# Patient Record
Sex: FEMALE
[De-identification: names, ages, dates, MRNs, and addresses within clinical notes are randomized per-mention and may not be internally consistent; named-entity substitution may affect disease eponyms.]

---

## 2017-07-15 ENCOUNTER — HOSPITAL ENCOUNTER (INPATIENT)
Dept: HOSPITAL 5 - ED | Age: 42
LOS: 1 days | Discharge: HOME | DRG: 315 | End: 2017-07-16
Attending: INTERNAL MEDICINE | Admitting: INTERNAL MEDICINE
Payer: MEDICARE

## 2017-07-15 DIAGNOSIS — Z98.51: ICD-10-CM

## 2017-07-15 DIAGNOSIS — N18.9: ICD-10-CM

## 2017-07-15 DIAGNOSIS — Z83.3: ICD-10-CM

## 2017-07-15 DIAGNOSIS — E78.5: ICD-10-CM

## 2017-07-15 DIAGNOSIS — E03.9: ICD-10-CM

## 2017-07-15 DIAGNOSIS — Z88.2: ICD-10-CM

## 2017-07-15 DIAGNOSIS — E66.9: ICD-10-CM

## 2017-07-15 DIAGNOSIS — I25.2: ICD-10-CM

## 2017-07-15 DIAGNOSIS — S93.402A: ICD-10-CM

## 2017-07-15 DIAGNOSIS — E11.22: ICD-10-CM

## 2017-07-15 DIAGNOSIS — I95.9: Primary | ICD-10-CM

## 2017-07-15 DIAGNOSIS — Z90.79: ICD-10-CM

## 2017-07-15 DIAGNOSIS — Z95.5: ICD-10-CM

## 2017-07-15 DIAGNOSIS — E87.6: ICD-10-CM

## 2017-07-15 DIAGNOSIS — I50.22: ICD-10-CM

## 2017-07-15 DIAGNOSIS — I25.5: ICD-10-CM

## 2017-07-15 DIAGNOSIS — I25.110: ICD-10-CM

## 2017-07-15 DIAGNOSIS — Z95.810: ICD-10-CM

## 2017-07-15 DIAGNOSIS — E11.40: ICD-10-CM

## 2017-07-15 DIAGNOSIS — Z82.49: ICD-10-CM

## 2017-07-15 DIAGNOSIS — K21.9: ICD-10-CM

## 2017-07-15 DIAGNOSIS — I13.0: ICD-10-CM

## 2017-07-15 PROCEDURE — 93010 ELECTROCARDIOGRAM REPORT: CPT

## 2017-07-15 PROCEDURE — 84484 ASSAY OF TROPONIN QUANT: CPT

## 2017-07-15 PROCEDURE — 80048 BASIC METABOLIC PNL TOTAL CA: CPT

## 2017-07-15 PROCEDURE — 93005 ELECTROCARDIOGRAM TRACING: CPT

## 2017-07-15 PROCEDURE — 36415 COLL VENOUS BLD VENIPUNCTURE: CPT

## 2017-07-15 PROCEDURE — 96374 THER/PROPH/DIAG INJ IV PUSH: CPT

## 2017-07-15 PROCEDURE — 82962 GLUCOSE BLOOD TEST: CPT

## 2017-07-15 PROCEDURE — 82550 ASSAY OF CK (CPK): CPT

## 2017-07-15 PROCEDURE — 85025 COMPLETE CBC W/AUTO DIFF WBC: CPT

## 2017-07-15 PROCEDURE — 82553 CREATINE MB FRACTION: CPT

## 2017-07-15 PROCEDURE — 96375 TX/PRO/DX INJ NEW DRUG ADDON: CPT

## 2017-07-15 PROCEDURE — 96372 THER/PROPH/DIAG INJ SC/IM: CPT

## 2017-07-16 VITALS — SYSTOLIC BLOOD PRESSURE: 93 MMHG | DIASTOLIC BLOOD PRESSURE: 54 MMHG

## 2017-07-16 LAB
ANION GAP SERPL CALC-SCNC: 20 MMOL/L
BASOPHILS NFR BLD AUTO: 0.6 % (ref 0–1.8)
BUN SERPL-MCNC: 34 MG/DL (ref 7–17)
BUN/CREAT SERPL: 22.66 %
CALCIUM SERPL-MCNC: 9.1 MG/DL (ref 8.4–10.2)
CHLORIDE SERPL-SCNC: 93.6 MMOL/L (ref 98–107)
CK SERPL-CCNC: 33 UNITS/L (ref 30–135)
CK SERPL-CCNC: 46 UNITS/L (ref 30–135)
CK SERPL-CCNC: 48 UNITS/L (ref 30–135)
CO2 SERPL-SCNC: 26 MMOL/L (ref 22–30)
EOSINOPHIL NFR BLD AUTO: 2.2 % (ref 0–4.3)
GLUCOSE SERPL-MCNC: 122 MG/DL (ref 65–100)
HCT VFR BLD CALC: 37.4 % (ref 30.3–42.9)
HGB BLD-MCNC: 12.8 GM/DL (ref 10.1–14.3)
MCH RBC QN AUTO: 32 PG (ref 28–32)
MCHC RBC AUTO-ENTMCNC: 34 % (ref 30–34)
MCV RBC AUTO: 94 FL (ref 79–97)
PLATELET # BLD: 279 K/MM3 (ref 140–440)
POTASSIUM SERPL-SCNC: 3.2 MMOL/L (ref 3.6–5)
RBC # BLD AUTO: 3.99 M/MM3 (ref 3.65–5.03)
SODIUM SERPL-SCNC: 136 MMOL/L (ref 137–145)
WBC # BLD AUTO: 9.6 K/MM3 (ref 4.5–11)

## 2017-07-16 RX ADMIN — MORPHINE SULFATE PRN MG: 2 INJECTION, SOLUTION INTRAMUSCULAR; INTRAVENOUS at 14:01

## 2017-07-16 RX ADMIN — GABAPENTIN SCH MG: 300 CAPSULE ORAL at 12:23

## 2017-07-16 RX ADMIN — ENOXAPARIN SODIUM SCH: 100 INJECTION SUBCUTANEOUS at 10:11

## 2017-07-16 RX ADMIN — MORPHINE SULFATE PRN MG: 2 INJECTION, SOLUTION INTRAMUSCULAR; INTRAVENOUS at 07:35

## 2017-07-16 RX ADMIN — GABAPENTIN SCH: 300 CAPSULE ORAL at 15:00

## 2017-07-16 RX ADMIN — ENOXAPARIN SODIUM SCH MG: 100 INJECTION SUBCUTANEOUS at 08:52

## 2017-07-16 NOTE — DISCHARGE SUMMARY
Providers





- Providers


Date of Admission: 


07/16/17 05:33





Date of discharge: 07/16/17


Attending physician: 


BERT VALLEJO MD





 





07/16/17


Consult to Cardiac Rehabilitation [CONS] Routine 


   Reason For Exam: Phase 1





07/16/17 05:35


Consult to Physician [CONS] Routine 


   Consulting Provider: ANJEL WATSON


   Reason For Exam: ua


   Place consult to:: CARDIOLOGY


   Notified:: Y


   Was contact made?: Yes


   If yes, spoke with:: RENA/PER CAZARES


   Time called:: 08:20











Primary care physician: 


PRIMARY CARE MD








Hospitalization


Condition: Stable


Disposition: DC-01 TO HOME OR SELFCARE


Time spent for discharge: 31 mins





Exam





- Constitutional


Vitals: 


 











Temp Pulse Resp BP Pulse Ox


 


 99.1 F   69   13   93/54   98 


 


 07/15/17 22:53  07/16/17 10:30  07/16/17 10:30  07/16/17 10:30  07/16/17 10:30














Plan


Activity: advance as tolerated, fall precautions


Diet: low fat, low cholesterol, low salt, diabetic


Special Instructions: record daily weights, record daily BP diary, record blood 

sugar diary


Additional Instructions: Follow with primary cardiologist in 1 week


Follow up with: 


PRIMARY CARE,MD [Primary Care Provider] - 7 Days

## 2017-07-16 NOTE — CONSULTATION
History of Present Illness


Consult date: 07/16/17


Requesting physician: BERT VALLEJO


Consult reason: chest pain, hypotension, known to you


History of present illness: 





41-year-old female with a past medical history history of coronary artery 

disease status post myocardial infarction and multiple stents her most recent 

to the right coronary artery in March 2017, ischemic cardiomyopathy status post 

cardiac defibrillator, chronic systolic heart failure, hypertension, 

hypothyroidism, and diabetes presents to [facility] emergency department after the patient was being evaluated in 

the urgent care for an ankle sprain.  While in the urgent care the patient 

developed hypotension with some mild lightheadedness and dizziness.  Since 

being here at the hospital the patient's blood pressure has normalized.  And 

she is completely asymptomatic.





Past History


Past Medical History: arrhythmia, CAD, diabetes, heart failure, hypertension, 

hyperlipidemia, hypothyroidism


Past Surgical History: PTCA


Social history: denies: smoking, alcohol abuse, IV drug use


Family history: CAD, diabetes





Medications and Allergies


 Allergies











Allergy/AdvReac Type Severity Reaction Status Date / Time


 


adhesive tape Allergy  Rash Verified 04/28/16 17:40


 


bupivacaine Allergy  Angioedema Verified 04/28/16 17:40


 


Sulfa (Sulfonamide Allergy  Nausea Verified 04/28/16 17:40





Antibiotics)     


 


famotidine [From Pepcid] AdvReac  Vomiting Verified 04/28/16 17:40











 Home Medications











 Medication  Instructions  Recorded  Confirmed  Last Taken  Type


 


Isosorbide Mononitrate [Isosorbide 120 mg PO DAILY 02/05/15 07/15/17 04/25/17 

History





Mononitrate ER (IMDUR)]     


 


traZODone [Desyrel] 100 mg PO QHS  tablet 02/07/16 07/15/17 04/25/17 Rx


 


Potassium Chloride [K-Dur] 20 meq PO BID #60 tab 03/20/16 07/15/17 04/25/17 Rx


 


amLODIPine [Norvasc] 5 mg PO QAM 04/26/17 07/15/17 04/25/17 History


 


ALPRAZolam [Xanax TAB] 0.5 mg PO Q8H PRN #15 tablet 04/27/17 07/15/17 Unknown Rx


 


Aspirin EC [Aspirin Enteric Coated 325 mg PO QDAY #30 tablet 04/27/17 07/15/17 

Unknown Rx





TAB]     


 


AtorvaSTATin [Lipitor] 80 mg PO QHS #30 tablet 04/27/17 07/15/17 Unknown Rx


 


Clopidogrel [Plavix] 75 mg PO DAILY #30 tablet 04/27/17 07/15/17 Unknown Rx


 


Gabapentin [Neurontin] 600 mg PO TID #90 capsule 04/27/17 07/15/17 Unknown Rx


 


Insulin NPH, Human [NovoLIN N] 35 unit SUB-Q QAM #30 units 04/27/17 07/15/17 

Unknown Rx


 


Sertraline [Zoloft] 50 mg PO QDAY #30 tablet 04/27/17 07/15/17 Unknown Rx


 


Torsemide [Demadex] 100 mg PO QDAY #30 tablet 04/27/17 07/15/17 Unknown Rx


 


Insulin Glulisine [Apidra] 0 units SC QDAY 06/22/17 07/15/17 Unknown History


 


Levothyroxine [Synthroid] 100 mcg PO QDAY 06/22/17 07/15/17 Unknown History


 


Metoprolol Xl [Metoprolol 200 mg PO QDAY 06/22/17 07/15/17 Unknown History





SUCCINATE ER TAB]     


 


traMADol [Ultram 50 MG tab] 50 mg PO Q6HR PRN #20 tablet 06/24/17 07/15/17 

Unknown Rx











Active Meds: 


Active Medications





Acetaminophen (Tylenol)  650 mg PO Q4H PRN


   PRN Reason: Pain MILD(1-3)/Fever >100.5/HA


Alprazolam (Xanax)  0.5 mg PO Q8H PRN


   PRN Reason: Anxiety


Amlodipine Besylate (Norvasc)  5 mg PO QAM [facility]


   Last Admin: 07/16/17 12:02 Dose:  Not Given


Aspirin (Ecotrin)  325 mg PO QDAY [facility]


Atorvastatin Calcium (Lipitor)  80 mg PO QHS [facility]


Bisacodyl (Dulcolax)  10 mg SC QDAY PRN


   PRN Reason: Constipation unrelieved by MOM


Clopidogrel Bisulfate (Plavix)  75 mg PO DAILY [facility]


Dextrose (D50w (25gm))  50 ml IV PRN PRN


   PRN Reason: Hypoglycemia


Diphenhydramine HCl (Benadryl)  50 mg IV Q8H PRN


   PRN Reason: Itching


Enoxaparin Sodium (Lovenox)  30 mg SUB-Q QDAY [facility]


   Last Admin: 07/16/17 10:11 Dose:  Not Given


Gabapentin (Neurontin)  600 mg PO TID [facility]


Insulin Aspart (Novolog)  7 units SUB-Q ACHS [facility]


   Last Admin: 07/16/17 11:57 Dose:  7 units


Insulin Aspart (Novolog)  0 units SUB-Q ACHS [facility]


   PRN Reason: Protocol


   Last Admin: 07/16/17 11:57 Dose:  3 units


Insulin Detemir (Levemir)  25 units SUB-Q QHS [facility]


Isosorbide Mononitrate (Imdur)  120 mg PO DAILY [facility]


   Last Admin: 07/16/17 12:10 Dose:  Not Given


Levothyroxine Sodium (Synthroid)  100 mcg PO QDAY@0600 [facility]


Magnesium Hydroxide (Milk Of Magnesia)  30 ml PO Q4H PRN


   PRN Reason: Constipation


Metoprolol Succinate (Toprol Xl)  200 mg PO QDAY [facility]


   Last Admin: 07/16/17 12:10 Dose:  Not Given


Morphine Sulfate (Morphine)  2 mg IV Q4H PRN


   PRN Reason: Pain, Moderate (4-6)


   Last Admin: 07/16/17 07:35 Dose:  2 mg


Ondansetron HCl (Zofran)  4 mg IV Q8H PRN


   PRN Reason: N/V unrelieved by Reglan


Potassium Chloride (K-Dur)  20 meq PO BID [facility]


Sertraline HCl (Zoloft)  50 mg PO QDAY [facility]


Torsemide (Demadex)  100 mg PO QDAY [facility]


Tramadol HCl (Ultram)  50 mg PO Q6H PRN


   PRN Reason: Pain











Review of Systems


Constitutional: no weight loss, no weight gain, no fever


Breasts: deferred


Cardiovascular: no chest pain, no orthopnea, no palpitations, no shortness of 

breath


Respiratory: no cough, no cough with sputum


Gastrointestinal: no abdominal pain, no nausea, no vomiting


Genitourinary Female: no dysuria, no urinary frequency


Rectal: no pain, no incontinence


Musculoskeletal: no neck stiffness, no neck pain


Integumentary: no rash, no pruritis


Neurological: no paralysis, no weakness


Psychiatric: no anxiety, no memory loss


Endocrine: no heat intolerance, no polyphagia





Physical Examination


 Vital Signs











Temp Pulse Resp BP Pulse Ox


 


 99.1 F   62   18   107/63   100 


 


 07/15/17 22:53  07/15/17 22:53  07/15/17 22:53  07/15/17 22:53  07/15/17 22:53











General appearance: no acute distress, mild distress


HEENT: Positive: PERRL


Neck: Positive: neck supple, trachea midline


Cardiac: Positive: Reg Rate and Rhythm


Lungs: Positive: Normal Exam, clear to auscultation, Normal Breath Sounds


Neuro: Positive: Grossly Intact


Abdomen: Positive: Unremarkable, Soft, Active Bowel Sounds


Female genitourinary: deferred


Skin: Positive: Clear.  Negative: Rash


Extremities: Present: warm.  Absent: edema





Results





 07/16/17 00:45





 07/16/17 00:45


 Cardiac Enzymes











  07/16/17 Range/Units





  08:24 


 


CK-MB (CK-2)  1.3  (0.0-4.0)  ng/mL














- Imaging and Cardiology


Echo: report reviewed (ECHO 2/17:  EF 20-25%, mild LAE, mild MR)


Cardiac cath: report reviewed (6/17:  RCA:  mid , LM:  patent, LCX:  distal 

30-40%, LAD:  patent mid stent, left system very small for adult)





EKG interpretations





- Telemetry


EKG Rhythm: Sinus Rhythm





Assessment and Plan





Hypotension


   Resolved





Chest pain


   atypical 


   resolved


   Negative cardiac enzymes and no acute EKG changes


   


CAD:  RCAs/p MI/multiple stents/chronic angina


   stable





Chronic systolic heart failure





Ischemic CM


   S/P ICD





Hypertension


Diabetes


Obesity





Stable cardiac status


Continue current medication regimen


Okay to discharge home

## 2017-07-16 NOTE — HISTORY AND PHYSICAL REPORT
History of Present Illness


Date of examination: 07/16/17


History of present illness: 


40-year-old woman with a history of hypertension, diabetes complicated by 

gastroparesis, coronary artery disease, CHF, hypothyroidism comes to the 

emergency room with complaints of chest pain and shortness of breath which 

started yesterday.  Also states she fell down some stairs, went to urgent care 

and her blood pressure was noted to be low, systolic 70s, she was sent to the 

emergency room for further evaluation.  She was diagnosed with a sprain ankle 

at urgent care.  Pain is in the left chest area which she describes a sharp, 

pressure like pain, intermittent in nature's every 4 minutes, no radiation, 

intensity 7/10 .  Pain is worse with activity, better at rest. Admits to nausea

, shortness of breath, diaphoresis. 


Patient denies cough, abdominal pain, hematochezia, dysuria, frequency, focal 

weakness, dysarthria, fever chills, polydipsia polyuria, hot or cold intolerance

, easy bruisability, or rash or bleeding from mucosal membrane, rhinorrhea, 

epistaxis, earache, tinnitus, blurry vision, eye discharge, anxiety, 

depression. Other review of systems negative





PAST SURGICAL HISTORY: tubal ligation, left salpingectomy, right knee surgery, 

right arm surgery, left eye surgery, AICD





SOCIAL HISTORY: Denies alcohol, tobacco, drugs





FAMILY HISTORY: Coronary artery disease











Medications and Allergies


 Allergies











Allergy/AdvReac Type Severity Reaction Status Date / Time


 


adhesive tape Allergy  Rash Verified 04/28/16 17:40


 


bupivacaine Allergy  Angioedema Verified 04/28/16 17:40


 


Sulfa (Sulfonamide Allergy  Nausea Verified 04/28/16 17:40





Antibiotics)     


 


famotidine [From Pepcid] AdvReac  Vomiting Verified 04/28/16 17:40











 Home Medications











 Medication  Instructions  Recorded  Confirmed  Last Taken  Type


 


Isosorbide Mononitrate [Isosorbide 120 mg PO DAILY 02/05/15 07/15/17 04/25/17 

History





Mononitrate ER (IMDUR)]     


 


traZODone [Desyrel] 100 mg PO QHS  tablet 02/07/16 07/15/17 04/25/17 Rx


 


Potassium Chloride [K-Dur] 20 meq PO BID #60 tab 03/20/16 07/15/17 04/25/17 Rx


 


amLODIPine [Norvasc] 5 mg PO QAM 04/26/17 07/15/17 04/25/17 History


 


ALPRAZolam [Xanax TAB] 0.5 mg PO Q8H PRN #15 tablet 04/27/17 07/15/17 Unknown Rx


 


Aspirin EC [Aspirin Enteric Coated 325 mg PO QDAY #30 tablet 04/27/17 07/15/17 

Unknown Rx





TAB]     


 


AtorvaSTATin [Lipitor] 80 mg PO QHS #30 tablet 04/27/17 07/15/17 Unknown Rx


 


Clopidogrel [Plavix] 75 mg PO DAILY #30 tablet 04/27/17 07/15/17 Unknown Rx


 


Gabapentin [Neurontin] 600 mg PO TID #90 capsule 04/27/17 07/15/17 Unknown Rx


 


Insulin NPH, Human [NovoLIN N] 35 unit SUB-Q QAM #30 units 04/27/17 07/15/17 

Unknown Rx


 


Sertraline [Zoloft] 50 mg PO QDAY #30 tablet 04/27/17 07/15/17 Unknown Rx


 


Torsemide [Demadex] 100 mg PO QDAY #30 tablet 04/27/17 07/15/17 Unknown Rx


 


Insulin Glulisine [Apidra] 0 units SC QDAY 06/22/17 07/15/17 Unknown History


 


Levothyroxine [Synthroid] 100 mcg PO QDAY 06/22/17 07/15/17 Unknown History


 


Metoprolol Xl [Metoprolol 200 mg PO QDAY 06/22/17 07/15/17 Unknown History





SUCCINATE ER TAB]     


 


traMADol [Ultram 50 MG tab] 50 mg PO Q6HR PRN #20 tablet 06/24/17 07/15/17 

Unknown Rx














Exam





- Physical Exam


Narrative exam: 


Gen. appearance: Patient lying in bed, no apparent distress


HEENT: Normocephalic, atraumatic, pupils equally round and reactive to light, 

extraocular movement intact, and no sclericterus,. No JVD or thyromegaly or 

nodule,neck supple, no carotid bruit ,mucous membranes moist, no exudate or 

erythema


Heart: S1, S2, regular rate and rhythm


Lungs: Clear to auscultation bilaterally, breathing comfortable


Abdomen: Positive bowel sounds, nontender, nondistended, no organomegaly


Extremity: No edema, cyanosis, clubbing


Skin: No rash, nodules, warm, dry


Neuro: Oriented 3, cranial nerves II-12 intact, speech is fluent, motor and 

sensory intact








- Constitutional


Vitals: 


 











Temp Pulse Resp BP Pulse Ox


 


 99.1 F   68   12   100/61   99 


 


 07/15/17 22:53  07/16/17 03:00  07/16/17 03:00  07/16/17 03:00  07/16/17 03:00














Results





- Labs


CBC & Chem 7: 


 07/16/17 00:45





 07/16/17 00:45


Labs: 


 Abnormal lab results











  07/16/17 07/16/17 Range/Units





  00:45 00:45 


 


Lymph % (Auto)  36.5 H   (13.4-35.0)  %


 


Mono % (Auto)  8.3 H   (0.0-7.3)  %


 


Sodium   136 L  (137-145)  mmol/L


 


Potassium   3.2 L  (3.6-5.0)  mmol/L


 


Chloride   93.6 L  ()  mmol/L


 


BUN   34 H  (7-17)  mg/dL


 


Creatinine   1.5 H  (0.7-1.2)  mg/dL


 


Glucose   122 H  ()  mg/dL














Assessment and Plan


Unstable angina


Coronary artery disease


CHF, stable


spraiin


Diabetes


Hypothyroidism





Admit to medicine


,Check cardiac enzymes, IV morphine, consult cardiology


Continue cardiac medication, check fingersticks initiated insulin sliding scale


Start DVT prophylaxis

## 2017-07-16 NOTE — EMERGENCY DEPARTMENT REPORT
ED Chest Pain HPI





- General


Chief Complaint: Chest Pain


Stated Complaint: DIZZY, CHEST PAIN


Time Seen by Provider: 07/16/17 00:08


Source: EMS


Mode of arrival: Stretcher


Limitations: No Limitations





- History of Present Illness


Initial Comments: 





41-year-old female with a past medical history of CAD with 14-15 stent with 

most recent PCI to RCA on 06/13/2070 at [place], ischemic cardiopathy myopathy, 

AICD, heart failure with EF of 20-25%, hypothyroidism, chronic kidney disease, 

hypertension, and diabetes presents to the hospital complains of left ankle pain

, chest pain, and hypotension.  Pt  injured her left ankle and went to an 

urgent care for evaluation.  Patient states she had a x-ray that was negative 

for fracture and a Ace wrap was placed.  While at the urgent care center 

patient became hypertensive blood pressure 70/50.  Patient reports that her 

blood pressure medication or her systolic pressure was 100.  She states she 

typically runs 110/68.  After EMS arrival patient began to complain a left 

lateral sharp strong chest pain.  She denies shortness of breath or 

diaphoresis.  Mild nausea reported without vomiting.  Patient is followed by 

the [facility] but also has recent and frequent 

hospitalizations and visits here.


Severity scale (0 -10): 7





- Related Data


 Home Medications











 Medication  Instructions  Recorded  Confirmed  Last Taken


 


Isosorbide Mononitrate [Isosorbide 120 mg PO DAILY 02/05/15 07/15/17 04/25/17





Mononitrate ER (IMDUR)]    


 


amLODIPine [Norvasc] 5 mg PO QAM 04/26/17 07/15/17 04/25/17


 


Insulin Glulisine [Apidra] 0 units SC QDAY 06/22/17 07/15/17 Unknown


 


Levothyroxine [Synthroid] 100 mcg PO QDAY 06/22/17 07/15/17 Unknown


 


Metoprolol Xl [Metoprolol 200 mg PO QDAY 06/22/17 07/15/17 Unknown





SUCCINATE ER TAB]    








 Previous Rx's











 Medication  Instructions  Recorded  Last Taken  Type


 


traZODone [Desyrel] 100 mg PO QHS  tablet 02/07/16 04/25/17 Rx


 


Potassium Chloride [K-Dur] 20 meq PO BID #60 tab 03/20/16 04/25/17 Rx


 


ALPRAZolam [Xanax TAB] 0.5 mg PO Q8H PRN #15 tablet 04/27/17 Unknown Rx


 


Aspirin EC [Aspirin Enteric Coated 325 mg PO QDAY #30 tablet 04/27/17 Unknown Rx





TAB]    


 


AtorvaSTATin [Lipitor] 80 mg PO QHS #30 tablet 04/27/17 Unknown Rx


 


Clopidogrel [Plavix] 75 mg PO DAILY #30 tablet 04/27/17 Unknown Rx


 


Gabapentin [Neurontin] 600 mg PO TID #90 capsule 04/27/17 Unknown Rx


 


Insulin NPH, Human [NovoLIN N] 35 unit SUB-Q QAM #30 units 04/27/17 Unknown Rx


 


Sertraline [Zoloft] 50 mg PO QDAY #30 tablet 04/27/17 Unknown Rx


 


Torsemide [Demadex] 100 mg PO QDAY #30 tablet 04/27/17 Unknown Rx


 


traMADol [Ultram 50 MG tab] 50 mg PO Q6HR PRN #20 tablet 06/24/17 Unknown Rx











 Allergies











Allergy/AdvReac Type Severity Reaction Status Date / Time


 


adhesive tape Allergy  Rash Verified 04/28/16 17:40


 


bupivacaine Allergy  Angioedema Verified 04/28/16 17:40


 


Sulfa (Sulfonamide Allergy  Nausea Verified 04/28/16 17:40





Antibiotics)     


 


famotidine [From Pepcid] AdvReac  Vomiting Verified 04/28/16 17:40














Heart Score





- HEART Score


History: Slightly suspicious


EKG: Non-specific


Age: < 45


Risk factors: > 3 risk factors or hx of atherosclerotic disease


Troponin: < normal limit


HEART Score: 3





ED Review of Systems


ROS: 


Stated complaint: DIZZY, CHEST PAIN


Other details as noted in HPI





Comment: All other systems reviewed and negative


Other: 





Constitutional: No fevers chills or weight loss


Eyes: No eye pain visual changes or discharge


ENT: No ear pain or throat pain


Neck: Denies pain


Respiratory: Denies cough wheezing 


Cardiovascular: Denies  palpitations, syncope


GI: Denies abdominal pain


: Denies dysuria


Musculoskeletal: as per hpi 


Skin: Denies rash, lesions, erythema


Neurologic: Denies headache, numbness, weakness


Psychiatric: Denies suicidal ideation, hallucinations








ED Past Medical Hx





- Past Medical History


Previous Medical History?: Yes


Hx Hypertension: Yes


Hx Heart Attack/AMI: Yes


Hx Congestive Heart Failure: Yes


Hx Diabetes: Yes


Hx GERD: Yes


Hx Liver Disease: Yes


Hx Renal Disease: Yes


Hx Asthma: No


Hx COPD: No


Hx Tuberculosis: No


Hx HIV: No


Additional medical history: CAD, gatroparesis, hypOthyroidism, retinopathy, 

neuropathy.  15 stents





- Surgical History


Past Surgical History?: Yes


Hx Coronary Stent: Yes (15)


Hx Pacemaker: Yes


Hx Internal Defibrillator: Yes


Additional Surgical History: tubal ligation, left fallopian tube removed, right 

knee surgery, right arm surgery, left eye surgeryPOWER PORT,Pacemaker  

DEFIBRILATOR Right ring finger surgery





- Social History


Smoking Status: Never Smoker





- Medications


Home Medications: 


 Home Medications











 Medication  Instructions  Recorded  Confirmed  Last Taken  Type


 


Isosorbide Mononitrate [Isosorbide 120 mg PO DAILY 02/05/15 07/15/17 04/25/17 

History





Mononitrate ER (IMDUR)]     


 


traZODone [Desyrel] 100 mg PO QHS  tablet 02/07/16 07/15/17 04/25/17 Rx


 


Potassium Chloride [K-Dur] 20 meq PO BID #60 tab 03/20/16 07/15/17 04/25/17 Rx


 


amLODIPine [Norvasc] 5 mg PO QAM 04/26/17 07/15/17 04/25/17 History


 


ALPRAZolam [Xanax TAB] 0.5 mg PO Q8H PRN #15 tablet 04/27/17 07/15/17 Unknown Rx


 


Aspirin EC [Aspirin Enteric Coated 325 mg PO QDAY #30 tablet 04/27/17 07/15/17 

Unknown Rx





TAB]     


 


AtorvaSTATin [Lipitor] 80 mg PO QHS #30 tablet 04/27/17 07/15/17 Unknown Rx


 


Clopidogrel [Plavix] 75 mg PO DAILY #30 tablet 04/27/17 07/15/17 Unknown Rx


 


Gabapentin [Neurontin] 600 mg PO TID #90 capsule 04/27/17 07/15/17 Unknown Rx


 


Insulin NPH, Human [NovoLIN N] 35 unit SUB-Q QAM #30 units 04/27/17 07/15/17 

Unknown Rx


 


Sertraline [Zoloft] 50 mg PO QDAY #30 tablet 04/27/17 07/15/17 Unknown Rx


 


Torsemide [Demadex] 100 mg PO QDAY #30 tablet 04/27/17 07/15/17 Unknown Rx


 


Insulin Glulisine [Apidra] 0 units SC QDAY 06/22/17 07/15/17 Unknown History


 


Levothyroxine [Synthroid] 100 mcg PO QDAY 06/22/17 07/15/17 Unknown History


 


Metoprolol Xl [Metoprolol 200 mg PO QDAY 06/22/17 07/15/17 Unknown History





SUCCINATE ER TAB]     


 


traMADol [Ultram 50 MG tab] 50 mg PO Q6HR PRN #20 tablet 06/24/17 07/15/17 

Unknown Rx














ED Physical Exam





- General


Limitations: No Limitations





- Other


Other exam information: 





General: No limitations, patient is alert in no acute distress


Head exam: Atraumatic, normocephalic


Eyes exam: Normal appearance, pupils equal reactive to light, extraocular 

movements intact


ENT: Moist mucous membrane, normal oropharynx


Neck exam: Normal inspection, full range of motion, no meningismus nontender


Respiratory exam: Clear to auscultation bilateral, no wheezes, rales, crackles


Cardiovascular: Normal rate and rhythm, normal heart sounds, chest wall 

nontender


Abdomen: Soft, nondistended, and  nontender, with normal bowel sounds, no 

rebound, or guarding


Extremity: Full range of motion, mild swelling and tenderness to the medial 

left ankle, 2+ DP pulse


Back: Normal Inspection, full range of motion, no tenderness


Neurologic: Alert, oriented x3, cranial nerves intact, no motor or sensory 

deficit


Psychiatric: normal affect, normal mood


Skin: Warm, dry, intact





ED Course


 Vital Signs











  07/15/17 07/15/17 07/15/17





  22:53 23:09 23:10


 


Temperature 99.1 F  


 


Pulse Rate 62 64 65


 


Respiratory 18 14 16





Rate   


 


Blood Pressure   95/48


 


Blood Pressure 107/63  





[Left]   


 


O2 Sat by Pulse 100 99 100





Oximetry   














  07/15/17 07/15/17 07/15/17





  23:21 23:26 23:30


 


Temperature   


 


Pulse Rate 64  65


 


Respiratory 16 17 15





Rate   


 


Blood Pressure 92/54  92/57


 


Blood Pressure   





[Left]   


 


O2 Sat by Pulse 98 99 97





Oximetry   














  07/15/17 07/15/17 07/15/17





  23:41 23:43 23:51


 


Temperature   


 


Pulse Rate 64 64 64


 


Respiratory 14 14 14





Rate   


 


Blood Pressure 92/57 92/57 87/58


 


Blood Pressure   





[Left]   


 


O2 Sat by Pulse 97 97 97





Oximetry   














  07/16/17 07/16/17 07/16/17





  00:00 00:11 00:21


 


Temperature   


 


Pulse Rate 67 66 65


 


Respiratory 14 15 14





Rate   


 


Blood Pressure 98/56 98/56 87/61


 


Blood Pressure   





[Left]   


 


O2 Sat by Pulse 94 98 98





Oximetry   














  07/16/17 07/16/17 07/16/17





  00:30 00:41 00:51


 


Temperature   


 


Pulse Rate   66


 


Respiratory   16





Rate   


 


Blood Pressure 87/57 87/57 98/54


 


Blood Pressure   





[Left]   


 


O2 Sat by Pulse 99 98 98





Oximetry   














  07/16/17 07/16/17 07/16/17





  01:00 01:11 01:21


 


Temperature   


 


Pulse Rate 70 63 63


 


Respiratory 14 12 14





Rate   


 


Blood Pressure 101/60 87/57 107/66


 


Blood Pressure   





[Left]   


 


O2 Sat by Pulse 100 99 99





Oximetry   














  07/16/17 07/16/17 07/16/17





  01:31 01:41 01:51


 


Temperature   


 


Pulse Rate 63 64 66


 


Respiratory 14 14 14





Rate   


 


Blood Pressure 107/66 107/66 104/56


 


Blood Pressure   





[Left]   


 


O2 Sat by Pulse 99 99 98





Oximetry   














  07/16/17 07/16/17 07/16/17





  02:00 02:30 03:00


 


Temperature   


 


Pulse Rate 69 68 68


 


Respiratory 15 13 12





Rate   


 


Blood Pressure 96/60 93/51 100/61


 


Blood Pressure   





[Left]   


 


O2 Sat by Pulse 97 97 99





Oximetry   














- Reevaluation(s)


Reevaluation #1: 





07/16/17 


By mouth potassium provided for hypokalemia








ANURAG score





- Anurag Score


Age > 65: (0) No


Aspirin use within the Past 7 Days: (1) Yes


3 or more CAD Risk Factors: (1) Yes


2 or more Angina events in past 24 hrs: (1) Yes


Known CAD with more than 50% Stenosis: (1) Yes


Elevated Cardiac Markers: (1) Yes


ST Deviation Greater than 0.5mm: (0) No


ANURAG Score: 5





ED Medical Decision Making





- Lab Data


Result diagrams: 


 07/16/17 00:45





 07/16/17 00:45








 Lab Results











  07/16/17 07/16/17 07/16/17 Range/Units





  00:45 00:45 00:45 


 


WBC  9.6    (4.5-11.0)  K/mm3


 


RBC  3.99    (3.65-5.03)  M/mm3


 


Hgb  12.8    (10.1-14.3)  gm/dl


 


Hct  37.4    (30.3-42.9)  %


 


MCV  94    (79-97)  fl


 


MCH  32    (28-32)  pg


 


MCHC  34    (30-34)  %


 


RDW  14.4    (13.2-15.2)  %


 


Plt Count  279    (140-440)  K/mm3


 


Lymph % (Auto)  36.5 H    (13.4-35.0)  %


 


Mono % (Auto)  8.3 H    (0.0-7.3)  %


 


Eos % (Auto)  2.2    (0.0-4.3)  %


 


Baso % (Auto)  0.6    (0.0-1.8)  %


 


Lymph #  3.5    (1.2-5.4)  K/mm3


 


Mono #  0.8    (0.0-0.8)  K/mm3


 


Eos #  0.2    (0.0-0.4)  K/mm3


 


Baso #  0.1    (0.0-0.1)  K/mm3


 


Seg Neutrophils %  52.4    (40.0-70.0)  %


 


Seg Neutrophils #  5.0    (1.8-7.7)  K/mm3


 


Sodium   136 L   (137-145)  mmol/L


 


Potassium   3.2 L   (3.6-5.0)  mmol/L


 


Chloride   93.6 L   ()  mmol/L


 


Carbon Dioxide   26   (22-30)  mmol/L


 


Anion Gap   20   mmol/L


 


BUN   34 H   (7-17)  mg/dL


 


Creatinine   1.5 H   (0.7-1.2)  mg/dL


 


Estimated GFR   38   ml/min


 


BUN/Creatinine Ratio   22.66   %


 


Glucose   122 H   ()  mg/dL


 


Calcium   9.1   (8.4-10.2)  mg/dL


 


Total Creatine Kinase    48  ()  units/L


 


CK-MB (CK-2)    1.4  (0.0-4.0)  ng/mL


 


CK-MB (CK-2) Rel Index    2.9  (0-4)  


 


Troponin T   < 0.010   (0.00-0.029)  ng/mL














- EKG Data


-: EKG Interpreted by Me (nsr 68, pvc's antlat infarct prolonged qt)





- EKG Data


When compared to previous EKG there are: no significant change (compared to 6/23 /17)





- Medical Decision Making





Plan to the patient to hospital.  Patient has significant cardiac history and 

complains of chest pain although it sounds atypical in nature.  Initial cardiac 

enzymes negative.  EKG unchanged.  Patient's blood pressure in the 90s systolic 

the patient relatively stable.





- Differential Diagnosis


mi, atypical chest pain, medication reaction, arrhythmia


Critical Care Time: No


Critical care attestation.: 


If time is entered above; I have spent that time in minutes in the direct care 

of this critically ill patient, excluding procedure time.








ED Disposition


Clinical Impression: 


 AICD (automatic cardioverter/defibrillator) present, Chronic renal 

insufficiency, Acute chest pain, Left ankle sprain, H/O percutaneous 

transluminal coronary angioplasty, Stented coronary artery, Hypokalemia, 

Hypotension





Disposition: DC-09 OP ADMIT IP TO THIS HOSP


Is pt being admited?: Yes


Condition: Stable


Time of Disposition: 02:22 (Dr Stephen/hosp)

## 2017-07-29 ENCOUNTER — HOSPITAL ENCOUNTER (EMERGENCY)
Dept: HOSPITAL 5 - ED | Age: 42
Discharge: LEFT BEFORE DISCHARGE | End: 2017-07-29
Payer: MEDICARE

## 2017-07-29 VITALS — SYSTOLIC BLOOD PRESSURE: 115 MMHG | DIASTOLIC BLOOD PRESSURE: 65 MMHG

## 2017-07-29 DIAGNOSIS — R07.9: ICD-10-CM

## 2017-07-29 DIAGNOSIS — R42: Primary | ICD-10-CM

## 2017-07-29 DIAGNOSIS — Z53.21: ICD-10-CM

## 2017-07-29 LAB
ANION GAP SERPL CALC-SCNC: 21 MMOL/L
BASOPHILS NFR BLD AUTO: 0.7 % (ref 0–1.8)
BUN SERPL-MCNC: 25 MG/DL (ref 7–17)
BUN/CREAT SERPL: 19.23 %
CALCIUM SERPL-MCNC: 9 MG/DL (ref 8.4–10.2)
CHLORIDE SERPL-SCNC: 96.4 MMOL/L (ref 98–107)
CO2 SERPL-SCNC: 25 MMOL/L (ref 22–30)
EOSINOPHIL NFR BLD AUTO: 1.2 % (ref 0–4.3)
GLUCOSE SERPL-MCNC: 407 MG/DL (ref 65–100)
HCT VFR BLD CALC: 40 % (ref 30.3–42.9)
HGB BLD-MCNC: 13.3 GM/DL (ref 10.1–14.3)
MCH RBC QN AUTO: 32 PG (ref 28–32)
MCHC RBC AUTO-ENTMCNC: 33 % (ref 30–34)
MCV RBC AUTO: 95 FL (ref 79–97)
PLATELET # BLD: 219 K/MM3 (ref 140–440)
POTASSIUM SERPL-SCNC: 3.9 MMOL/L (ref 3.6–5)
RBC # BLD AUTO: 4.2 M/MM3 (ref 3.65–5.03)
SODIUM SERPL-SCNC: 138 MMOL/L (ref 137–145)
WBC # BLD AUTO: 7.6 K/MM3 (ref 4.5–11)

## 2017-07-29 PROCEDURE — 36415 COLL VENOUS BLD VENIPUNCTURE: CPT

## 2017-07-29 PROCEDURE — 85025 COMPLETE CBC W/AUTO DIFF WBC: CPT

## 2017-07-29 PROCEDURE — 84484 ASSAY OF TROPONIN QUANT: CPT

## 2017-07-29 PROCEDURE — 80048 BASIC METABOLIC PNL TOTAL CA: CPT

## 2017-08-17 ENCOUNTER — HOSPITAL ENCOUNTER (INPATIENT)
Dept: HOSPITAL 5 - ED | Age: 42
LOS: 2 days | Discharge: HOME | DRG: 291 | End: 2017-08-19
Attending: INTERNAL MEDICINE | Admitting: INTERNAL MEDICINE
Payer: MEDICARE

## 2017-08-17 DIAGNOSIS — N18.9: ICD-10-CM

## 2017-08-17 DIAGNOSIS — E11.319: ICD-10-CM

## 2017-08-17 DIAGNOSIS — K21.9: ICD-10-CM

## 2017-08-17 DIAGNOSIS — I25.5: ICD-10-CM

## 2017-08-17 DIAGNOSIS — Z88.2: ICD-10-CM

## 2017-08-17 DIAGNOSIS — E66.9: ICD-10-CM

## 2017-08-17 DIAGNOSIS — E11.40: ICD-10-CM

## 2017-08-17 DIAGNOSIS — Z95.5: ICD-10-CM

## 2017-08-17 DIAGNOSIS — Z95.810: ICD-10-CM

## 2017-08-17 DIAGNOSIS — E11.22: ICD-10-CM

## 2017-08-17 DIAGNOSIS — Z91.048: ICD-10-CM

## 2017-08-17 DIAGNOSIS — Z82.49: ICD-10-CM

## 2017-08-17 DIAGNOSIS — I42.9: ICD-10-CM

## 2017-08-17 DIAGNOSIS — E03.9: ICD-10-CM

## 2017-08-17 DIAGNOSIS — I50.23: ICD-10-CM

## 2017-08-17 DIAGNOSIS — Z88.8: ICD-10-CM

## 2017-08-17 DIAGNOSIS — I13.0: Primary | ICD-10-CM

## 2017-08-17 DIAGNOSIS — I25.118: ICD-10-CM

## 2017-08-17 DIAGNOSIS — Z98.51: ICD-10-CM

## 2017-08-17 LAB
ANION GAP SERPL CALC-SCNC: 20 MMOL/L
BASOPHILS NFR BLD AUTO: 1.1 % (ref 0–1.8)
BUN SERPL-MCNC: 24 MG/DL (ref 7–17)
BUN/CREAT SERPL: 17.14 %
CALCIUM SERPL-MCNC: 8.7 MG/DL (ref 8.4–10.2)
CHLORIDE SERPL-SCNC: 97.3 MMOL/L (ref 98–107)
CO2 SERPL-SCNC: 27 MMOL/L (ref 22–30)
EOSINOPHIL NFR BLD AUTO: 2.9 % (ref 0–4.3)
GLUCOSE SERPL-MCNC: 206 MG/DL (ref 65–100)
HCT VFR BLD CALC: 39.7 % (ref 30.3–42.9)
HGB BLD-MCNC: 13.2 GM/DL (ref 10.1–14.3)
MCH RBC QN AUTO: 32 PG (ref 28–32)
MCHC RBC AUTO-ENTMCNC: 33 % (ref 30–34)
MCV RBC AUTO: 95 FL (ref 79–97)
PLATELET # BLD: 247 K/MM3 (ref 140–440)
POTASSIUM SERPL-SCNC: 3.8 MMOL/L (ref 3.6–5)
RBC # BLD AUTO: 4.18 M/MM3 (ref 3.65–5.03)
SODIUM SERPL-SCNC: 140 MMOL/L (ref 137–145)
WBC # BLD AUTO: 8.6 K/MM3 (ref 4.5–11)

## 2017-08-17 PROCEDURE — 85025 COMPLETE CBC W/AUTO DIFF WBC: CPT

## 2017-08-17 PROCEDURE — G0008 ADMIN INFLUENZA VIRUS VAC: HCPCS

## 2017-08-17 PROCEDURE — 84703 CHORIONIC GONADOTROPIN ASSAY: CPT

## 2017-08-17 PROCEDURE — 84484 ASSAY OF TROPONIN QUANT: CPT

## 2017-08-17 PROCEDURE — 90471 IMMUNIZATION ADMIN: CPT

## 2017-08-17 PROCEDURE — 80048 BASIC METABOLIC PNL TOTAL CA: CPT

## 2017-08-17 PROCEDURE — 93010 ELECTROCARDIOGRAM REPORT: CPT

## 2017-08-17 PROCEDURE — 82962 GLUCOSE BLOOD TEST: CPT

## 2017-08-17 PROCEDURE — 82550 ASSAY OF CK (CPK): CPT

## 2017-08-17 PROCEDURE — 96375 TX/PRO/DX INJ NEW DRUG ADDON: CPT

## 2017-08-17 PROCEDURE — 83880 ASSAY OF NATRIURETIC PEPTIDE: CPT

## 2017-08-17 PROCEDURE — 36415 COLL VENOUS BLD VENIPUNCTURE: CPT

## 2017-08-17 PROCEDURE — 96374 THER/PROPH/DIAG INJ IV PUSH: CPT

## 2017-08-17 PROCEDURE — 90686 IIV4 VACC NO PRSV 0.5 ML IM: CPT

## 2017-08-17 PROCEDURE — 93005 ELECTROCARDIOGRAM TRACING: CPT

## 2017-08-17 PROCEDURE — 71020: CPT

## 2017-08-17 PROCEDURE — 82553 CREATINE MB FRACTION: CPT

## 2017-08-18 LAB — CK SERPL-CCNC: 56 UNITS/L (ref 30–135)

## 2017-08-18 RX ADMIN — ENOXAPARIN SODIUM SCH MG: 100 INJECTION SUBCUTANEOUS at 10:08

## 2017-08-18 RX ADMIN — FUROSEMIDE SCH MG: 10 INJECTION, SOLUTION INTRAVENOUS at 17:50

## 2017-08-18 RX ADMIN — ASPIRIN SCH MG: 81 TABLET, CHEWABLE ORAL at 10:09

## 2017-08-18 RX ADMIN — FUROSEMIDE SCH MG: 10 INJECTION, SOLUTION INTRAVENOUS at 05:07

## 2017-08-18 RX ADMIN — DIPHENHYDRAMINE HYDROCHLORIDE PRN MG: 50 INJECTION, SOLUTION INTRAMUSCULAR; INTRAVENOUS at 19:43

## 2017-08-18 RX ADMIN — LEVOTHYROXINE SODIUM SCH MCG: 0.1 TABLET ORAL at 05:06

## 2017-08-18 RX ADMIN — MORPHINE SULFATE PRN MG: 4 INJECTION, SOLUTION INTRAMUSCULAR; INTRAVENOUS at 13:13

## 2017-08-18 RX ADMIN — INSULIN ASPART SCH UNITS: 100 INJECTION, SOLUTION INTRAVENOUS; SUBCUTANEOUS at 12:12

## 2017-08-18 RX ADMIN — MORPHINE SULFATE PRN MG: 4 INJECTION, SOLUTION INTRAMUSCULAR; INTRAVENOUS at 07:48

## 2017-08-18 RX ADMIN — SERTRALINE SCH MG: 50 TABLET, FILM COATED ORAL at 10:09

## 2017-08-18 RX ADMIN — LISINOPRIL SCH MG: 5 TABLET ORAL at 10:10

## 2017-08-18 RX ADMIN — GABAPENTIN SCH MG: 300 CAPSULE ORAL at 13:13

## 2017-08-18 RX ADMIN — MORPHINE SULFATE PRN MG: 4 INJECTION, SOLUTION INTRAMUSCULAR; INTRAVENOUS at 19:42

## 2017-08-18 RX ADMIN — CLOPIDOGREL BISULFATE SCH MG: 75 TABLET ORAL at 10:08

## 2017-08-18 RX ADMIN — INSULIN ASPART SCH: 100 INJECTION, SOLUTION INTRAVENOUS; SUBCUTANEOUS at 21:55

## 2017-08-18 RX ADMIN — DIPHENHYDRAMINE HYDROCHLORIDE PRN MG: 50 INJECTION, SOLUTION INTRAMUSCULAR; INTRAVENOUS at 13:13

## 2017-08-18 RX ADMIN — GABAPENTIN SCH MG: 300 CAPSULE ORAL at 19:42

## 2017-08-18 RX ADMIN — DIPHENHYDRAMINE HYDROCHLORIDE PRN MG: 50 INJECTION, SOLUTION INTRAMUSCULAR; INTRAVENOUS at 07:05

## 2017-08-18 RX ADMIN — GABAPENTIN SCH MG: 300 CAPSULE ORAL at 07:47

## 2017-08-18 RX ADMIN — METOPROLOL SUCCINATE SCH MG: 25 TABLET, FILM COATED, EXTENDED RELEASE ORAL at 10:10

## 2017-08-18 RX ADMIN — INSULIN ASPART SCH: 100 INJECTION, SOLUTION INTRAVENOUS; SUBCUTANEOUS at 16:30

## 2017-08-18 NOTE — EVENT NOTE
Date: 08/18/17


Patient admitted this morning form chest tightness and CHF exacerbation. 

Cardiology consulted. Chest pain subsided, SOB is getting better.

## 2017-08-18 NOTE — ADMIT CRITERIA FORM
Admission Criteria Documentation: 





     HEART FAILURE: COMMON COMPLICATIONS 





Clinical Indications for Inpatient Care





                                                                               

        (Ruby/check or initial the applicable condition/criteria):


           


Ongoing inpatient care may be indicated for heart failure with 1 or more of the 

following (1)(2)(3)(4)(5)(6)(7)(8):


[ ]I.    New-onset heart failure


[ ]II.   Acute cardiac ischemia causing or associated with failure


[ ]III.  Ongoing need for care for primary condition requiring frequent therapy 

adjustments because of 


         changes in cardiac function (eg, drug dosage changes for drugs that 

are   renally metabolized)


[X ]IV. Complications of heart failure, including  1 or more of the following:


        [ ]a)   Hemodynamic instability


        [ ]b)   Pericardial effusion


        [ ]c)   Symptomatic pleural effusion(16)           


        [ ]d)   Hypoxemia


        [ ]e)   Tachypnea                                         


        [X ]f)    Dyspnea


        [ ]g)   Syncope


        [ ]h)   Altered mental status


        [ ]i)    Acute renal insufficiency that is severe (reduction of more 

than 50% in estimated glomerular 


                 filtration rate from baseline) or progressive (reduction of 

more than 25% in estimated glomerular 


                 filtration rate from baseline, with creatinine continuing to 

rise)


        [ ]j)    Debilitating anasarca (eg tissue breakdown with infection, 

inability to void due to edema)(E) (17)


        [ ]k)   Clinically significant metabolic abnormalities due to heart 

failure (e.g., new-onset metabolic acidosis)








Extended stay may be needed until ALL of the following are present(1)(3)(18)(41)

(55):


[ ]a)    Hemodynamic stability   


[ ]b)    Stable and effective diuretic regimen established (or patient on 

stable dialysis regimen if in chronic


          renal failure)   


[ ]c)    Volume status acceptable on oral medication   


[ ]d)    Breathing comfortably at rest   


[ ]e)    Saturation of arterial oxygen greater than 90% or at acceptable 

baseline      


[ ]f)     Pulmonary edema absent or improved


[ ]g)    Peripheral or sacral edema absent or improved   


[ ]h)    Renal function stable and manageable at a lower level of care      


[ ]i)     Complications (e.g., pleural effusion) resolved or manageable at a 

lower level of care


[ ]j)     Patient or caregiver has received written discharge instructions or 

educational material addressing activity


          level, diet, discharge medications, follow-up appointment, weight 

monitoring, and what to do if symptoms


          worsen. (56)(57)(58)   











The original [facility] [employer] content created by Jami Brady has been revised.


The portions of the content which have been revised are identified through the 

use of italic text or in bold, and Jami Brady 


has neither reviewed nor approved the modified material.All other unmodified 

content is copyright  [facility]





Please see references footnoted in the original [facility] edition 

2017





Admission Criteria Met: Yes

## 2017-08-18 NOTE — EMERGENCY DEPARTMENT REPORT
ED Shortness of Breath HPI





- General


Chief Complaint: Chest Pain


Stated Complaint: CHEST PAIN/DIZZY


Time Seen by Provider: 08/18/17 01:09


Source: patient, old records reviewed (multiple hospital visits for chest pain 

and shortness of breath)


Mode of arrival: Ambulatory


Limitations: No Limitations





- History of Present Illness


Initial Comments: 





41-year-old female with a past medical history CHF, diabetes, GERD, CAD, 

hypertension, renal sufficiency presents to the hospital complains of fluid 

retention and shortness of breath 2 days.  Patient complaining of worsening 

edema to her legs, abdomen, and face.  Patient states she has a monitor in her 

chest to determine when she is volume overloaded.  She received a call from her 

cardiologist that this was the case.  Patient took an extra dose of her 

furosemide and the next day instead of losing weight she gained 1 pound and 

continued to have swelling.  She was called back and told that her numbers were 

even higher today and therefore patient came to the hospital for evaluation and 

diuresis.  Patient complains of intermittent sharp left-sided chest pain 

without aggravating or alleviating factors.  She is compliant with all her 

medication.  Cardiologist at [facility]





- Related Data


 Home Medications











 Medication  Instructions  Recorded  Confirmed  Last Taken


 


Isosorbide Mononitrate [Isosorbide 120 mg PO DAILY 02/05/15 08/18/17 08/17/17





Mononitrate ER (IMDUR)]    


 


amLODIPine [Norvasc] 5 mg PO QAM 04/26/17 08/18/17 08/17/17


 


Insulin Glulisine [Apidra] 0 units SC QDAY 06/22/17 08/18/17 08/17/17


 


Levothyroxine [Synthroid] 100 mcg PO QDAY 06/22/17 08/18/17 08/17/17


 


Metoprolol Xl [Metoprolol 200 mg PO QDAY 06/22/17 08/18/17 08/17/17





SUCCINATE ER TAB]    








 Previous Rx's











 Medication  Instructions  Recorded  Last Taken  Type


 


traZODone [Desyrel] 100 mg PO QHS  tablet 02/07/16 08/16/17 Rx


 


Potassium Chloride [K-Dur] 20 meq PO BID #60 tab 03/20/16 08/17/17 Rx


 


ALPRAZolam [Xanax TAB] 0.5 mg PO Q8H PRN #15 tablet 04/27/17 08/17/17 Rx


 


Aspirin EC [Aspirin Enteric Coated 325 mg PO QDAY #30 tablet 04/27/17 08/17/17 

Rx





TAB]    


 


AtorvaSTATin [Lipitor] 80 mg PO QHS #30 tablet 04/27/17 08/17/17 Rx


 


Clopidogrel [Plavix] 75 mg PO DAILY #30 tablet 04/27/17 08/17/17 Rx


 


Gabapentin [Neurontin] 600 mg PO TID #90 capsule 04/27/17 08/17/17 Rx


 


Insulin NPH, Human [NovoLIN N] 35 unit SUB-Q QAM #30 units 04/27/17 08/17/17 Rx


 


Sertraline [Zoloft] 50 mg PO QDAY #30 tablet 04/27/17 08/17/17 Rx


 


Torsemide [Demadex] 100 mg PO QDAY #30 tablet 04/27/17 08/17/17 Rx


 


traMADol [Ultram 50 MG tab] 50 mg PO Q6HR PRN #20 tablet 06/24/17 08/17/17 Rx











 Allergies











Allergy/AdvReac Type Severity Reaction Status Date / Time


 


adhesive tape Allergy  Rash Verified 08/18/17 01:14


 


bupivacaine Allergy  Angioedema Verified 08/18/17 01:14


 


Sulfa (Sulfonamide Allergy  Nausea Verified 08/18/17 01:14





Antibiotics)     


 


famotidine [From Pepcid] AdvReac  Vomiting Verified 08/18/17 01:14














ED Review of Systems


ROS: 


Stated complaint: CHEST PAIN/DIZZY


Other details as noted in HPI





Comment: All other systems reviewed and negative


Other: 





Constitutional: No fevers chills 


Eyes: No eye pain visual changes or 


ENT: No ear pain or throat pain


Neck: Denies pain


Respiratory: Denies cough wheezing 


Cardiovascular: Denies  palpitations, syncope


GI: Denies abdominal pain, nausea, vomiting, diarrhea


: Denies dysuria,


Musculoskeletal: Denies back pain,


Skin: Denies rash, lesions, erythema


Neurologic: Denies headache, numbness, weakness


Psychiatric: Denies suicidal ideation, hallucinations








ED Past Medical Hx





- Past Medical History


Previous Medical History?: Yes


Hx Hypertension: Yes


Hx Heart Attack/AMI: Yes


Hx Congestive Heart Failure: Yes


Hx Diabetes: Yes


Hx GERD: Yes


Hx Liver Disease: Yes


Hx Renal Disease: Yes


Hx Asthma: No


Hx COPD: No


Hx Tuberculosis: No


Hx HIV: No


Additional medical history: CAD, gatroparesis, hypOthyroidism, retinopathy, 

neuropathy.  15 stents





- Surgical History


Hx Coronary Stent: Yes (15)


Hx Pacemaker: Yes


Hx Internal Defibrillator: Yes


Additional Surgical History: tubal ligation, left fallopian tube removed, right 

knee surgery, right arm surgery, left eye surgeryPOWER PORT,Pacemaker  

DEFIBRILATOR Right ring finger surgery





- Social History


Smoking Status: Never Smoker


Substance Use Type: None





- Medications


Home Medications: 


 Home Medications











 Medication  Instructions  Recorded  Confirmed  Last Taken  Type


 


Isosorbide Mononitrate [Isosorbide 120 mg PO DAILY 02/05/15 08/18/17 08/17/17 

History





Mononitrate ER (IMDUR)]     


 


traZODone [Desyrel] 100 mg PO QHS  tablet 02/07/16 08/18/17 08/16/17 Rx


 


Potassium Chloride [K-Dur] 20 meq PO BID #60 tab 03/20/16 08/18/17 08/17/17 Rx


 


amLODIPine [Norvasc] 5 mg PO QAM 04/26/17 08/18/17 08/17/17 History


 


ALPRAZolam [Xanax TAB] 0.5 mg PO Q8H PRN #15 tablet 04/27/17 08/18/17 08/17/17 

Rx


 


Aspirin EC [Aspirin Enteric Coated 325 mg PO QDAY #30 tablet 04/27/17 08/18/17 08/17/17 Rx





TAB]     


 


AtorvaSTATin [Lipitor] 80 mg PO QHS #30 tablet 04/27/17 08/18/17 08/17/17 Rx


 


Clopidogrel [Plavix] 75 mg PO DAILY #30 tablet 04/27/17 08/18/17 08/17/17 Rx


 


Gabapentin [Neurontin] 600 mg PO TID #90 capsule 04/27/17 08/18/17 08/17/17 Rx


 


Insulin NPH, Human [NovoLIN N] 35 unit SUB-Q QAM #30 units 04/27/17 08/18/17 08/ 17/17 Rx


 


Sertraline [Zoloft] 50 mg PO QDAY #30 tablet 04/27/17 08/18/17 08/17/17 Rx


 


Torsemide [Demadex] 100 mg PO QDAY #30 tablet 04/27/17 08/18/17 08/17/17 Rx


 


Insulin Glulisine [Apidra] 0 units SC QDAY 06/22/17 08/18/17 08/17/17 History


 


Levothyroxine [Synthroid] 100 mcg PO QDAY 06/22/17 08/18/17 08/17/17 History


 


Metoprolol Xl [Metoprolol 200 mg PO QDAY 06/22/17 08/18/17 08/17/17 History





SUCCINATE ER TAB]     


 


traMADol [Ultram 50 MG tab] 50 mg PO Q6HR PRN #20 tablet 06/24/17 08/18/17 08/17 /17 Rx














ED Physical Exam





- General


Limitations: No Limitations





- Other


Other exam information: 





General: No limitations, patient is alert in no acute distress


Head exam: Atraumatic, normocephalic


Eyes exam: Normal appearance, pupils equal reactive to light, extraocular 

movements intact


ENT: Moist mucous membrane, normal oropharynx


Neck exam: Normal inspection, full range of motion, no meningismus nontender


Respiratory exam: Crackles at the right base, no tachypnea or accessory muscle 

use


Cardiovascular: Normal rate and rhythm, normal heart sounds


Abdomen: Soft, nondistended, and  nontender, with normal bowel sounds, no 

rebound, or guarding


Extremity: Full range of motion normal inspection no deformity, mild leg edema 

noted, no leg asymmetry


Back: Normal Inspection, full range of motion, no tenderness


Neurologic: Alert, oriented x3, cranial nerves intact, no motor or sensory 

deficit


Psychiatric: normal affect, normal mood


Skin: Warm, dry, intact





ED Course


 Vital Signs











  08/17/17 08/17/17 08/18/17





  18:21 22:09 01:17


 


Temperature 98.6 F  


 


Pulse Rate 72 77 72


 


Respiratory 20 24 20





Rate   


 


Blood Pressure 114/68 125/78 


 


Blood Pressure   123/79





[Right]   


 


O2 Sat by Pulse 100 98 99





Oximetry   














  08/18/17





  03:00


 


Temperature 


 


Pulse Rate 73


 


Respiratory 16





Rate 


 


Blood Pressure 


 


Blood Pressure 114/68





[Right] 


 


O2 Sat by Pulse 96





Oximetry 














- Reevaluation(s)


Reevaluation #1: 





08/18/17


Patient given IV dose of Lasix








ED Medical Decision Making





- Lab Data


Result diagrams: 


 08/17/17 18:33





 08/17/17 18:33








 Lab Results











  08/17/17 08/17/17 08/17/17 Range/Units





  18:33 18:33 18:33 


 


WBC  8.6    (4.5-11.0)  K/mm3


 


RBC  4.18    (3.65-5.03)  M/mm3


 


Hgb  13.2    (10.1-14.3)  gm/dl


 


Hct  39.7    (30.3-42.9)  %


 


MCV  95    (79-97)  fl


 


MCH  32    (28-32)  pg


 


MCHC  33    (30-34)  %


 


RDW  14.8    (13.2-15.2)  %


 


Plt Count  247    (140-440)  K/mm3


 


Lymph % (Auto)  39.5 H    (13.4-35.0)  %


 


Mono % (Auto)  6.5    (0.0-7.3)  %


 


Eos % (Auto)  2.9    (0.0-4.3)  %


 


Baso % (Auto)  1.1    (0.0-1.8)  %


 


Lymph #  3.4    (1.2-5.4)  K/mm3


 


Mono #  0.6    (0.0-0.8)  K/mm3


 


Eos #  0.3    (0.0-0.4)  K/mm3


 


Baso #  0.1    (0.0-0.1)  K/mm3


 


Seg Neutrophils %  50.0    (40.0-70.0)  %


 


Seg Neutrophils #  4.3    (1.8-7.7)  K/mm3


 


Sodium   140   (137-145)  mmol/L


 


Potassium   3.8   (3.6-5.0)  mmol/L


 


Chloride   97.3 L   ()  mmol/L


 


Carbon Dioxide   27   (22-30)  mmol/L


 


Anion Gap   20   mmol/L


 


BUN   24 H   (7-17)  mg/dL


 


Creatinine   1.4 H   (0.7-1.2)  mg/dL


 


Estimated GFR   41   ml/min


 


BUN/Creatinine Ratio   17.14   %


 


Glucose   206 H   ()  mg/dL


 


Calcium   8.7   (8.4-10.2)  mg/dL


 


Total Creatine Kinase     ()  units/L


 


CK-MB (CK-2)     (0.0-4.0)  ng/mL


 


CK-MB (CK-2) Rel Index     (0-4)  


 


Troponin T   < 0.010   (0.00-0.029)  ng/mL


 


NT-Pro-B Natriuret Pep     (0-450)  pg/mL


 


HCG, Qual    Negative  (Negative)  














  08/18/17 08/18/17 08/18/17 Range/Units





  Unknown Unknown Unknown 


 


WBC     (4.5-11.0)  K/mm3


 


RBC     (3.65-5.03)  M/mm3


 


Hgb     (10.1-14.3)  gm/dl


 


Hct     (30.3-42.9)  %


 


MCV     (79-97)  fl


 


MCH     (28-32)  pg


 


MCHC     (30-34)  %


 


RDW     (13.2-15.2)  %


 


Plt Count     (140-440)  K/mm3


 


Lymph % (Auto)     (13.4-35.0)  %


 


Mono % (Auto)     (0.0-7.3)  %


 


Eos % (Auto)     (0.0-4.3)  %


 


Baso % (Auto)     (0.0-1.8)  %


 


Lymph #     (1.2-5.4)  K/mm3


 


Mono #     (0.0-0.8)  K/mm3


 


Eos #     (0.0-0.4)  K/mm3


 


Baso #     (0.0-0.1)  K/mm3


 


Seg Neutrophils %     (40.0-70.0)  %


 


Seg Neutrophils #     (1.8-7.7)  K/mm3


 


Sodium     (137-145)  mmol/L


 


Potassium     (3.6-5.0)  mmol/L


 


Chloride     ()  mmol/L


 


Carbon Dioxide     (22-30)  mmol/L


 


Anion Gap     mmol/L


 


BUN     (7-17)  mg/dL


 


Creatinine     (0.7-1.2)  mg/dL


 


Estimated GFR     ml/min


 


BUN/Creatinine Ratio     %


 


Glucose     ()  mg/dL


 


Calcium     (8.4-10.2)  mg/dL


 


Total Creatine Kinase    56  ()  units/L


 


CK-MB (CK-2)    1.6  (0.0-4.0)  ng/mL


 


CK-MB (CK-2) Rel Index    2.8  (0-4)  


 


Troponin T  < 0.010   < 0.010  (0.00-0.029)  ng/mL


 


NT-Pro-B Natriuret Pep   1923 H   (0-450)  pg/mL


 


HCG, Qual     (Negative)  














- EKG Data


-: EKG Interpreted by Me (sinus 73  no ST elevation MI, septal infarct 

prolonged QT)





- EKG Data


When compared to previous EKG there are: no significant change





- Radiology Data


Radiology results: image reviewed (chest x-ray reviewed: Mild interstitial 

markings increase)





- Medical Decision Making





Plans admit patient to the hospital for diuresis given fluid retention and 

shortness of breath.  No signs of acute MI or elevation in troponin at this time





- Differential Diagnosis


atypical chest pain, CHF, MI, costochondritis


Critical Care Time: No


Critical care attestation.: 


If time is entered above; I have spent that time in minutes in the direct care 

of this critically ill patient, excluding procedure time.








ED Disposition


Clinical Impression: 


 Acute exacerbation of CHF (congestive heart failure), Chest pain, Automatic 

implantable cardioverter-defibrillator in situ





Disposition: DC-09 OP ADMIT IP TO THIS HOSP


Is pt being admited?: Yes


Condition: Stable


Time of Disposition: 02:35 (Dr Stephen/hosp)

## 2017-08-18 NOTE — CONSULTATION
History of Present Illness


Consult date: 08/18/17


Requesting physician: COMPA SHAFER


Consult reason: congestive heart failure


History of present illness: 


The patient is a 41 year old female with a past medical history significant for 

CAD s/p MI and PCI (most recent PCI to RCA on 6/13/2017 @ [facility]), ischemic 

cardiomyopathy s/p ICD, chronic systolic heart failure, cardioMEMS in situ, 

recurrent chronic angina, CKD, HTN, hypothyroid, diabetes. She is followed 

regularly by [facility], Dr. Anderson. She presented with c/o SOB, orthopnea

, PND and BLE swelling x 3 days PTA. She states that she received a call from 

her cardiologist 2 days ago stating that her PA pressure was at 23mmHg and was 

instructed to take an extra dose of torsemide. The following day, she received 

another call from her cardiologist stating her PA pressure was at 26mmHg and 

she was advised to report to ED. She denies any palpitations, nausea, vomiting, 

diaphoresis, dizziness or syncope. She reports compliance with her medications. 





Most recent LHC on 6/13/2017 for NSTEMI --> 100% mid RCA ISR with successful 

balloon angioplasty followed by coronary stenting with JORDI but significant 

distal disease involving the PDA and the PLV that was not amenable to 

intervention. Echo 6/9/2017 demonstrated EF 20 - 25%, mild to moderate LV 

dilation, restrictive diastolic filling pattern, RA mildly dilated, mild to 

moderate MR, mild TR, mild pulmonary HTN, RVSP 43mmHg. 











Past History


Past Medical History: CAD, diabetes, heart failure (systolic), hypertension, 

hypothyroidism, renal failure, other (ICMP)


Past Surgical History: Other (AICD in situ)


Social history: lives with family.  denies: smoking, alcohol abuse, 

prescription drug abuse





Medications and Allergies


 Allergies











Allergy/AdvReac Type Severity Reaction Status Date / Time


 


adhesive tape Allergy  Rash Verified 08/18/17 01:14


 


bupivacaine Allergy  Angioedema Verified 08/18/17 01:14


 


Sulfa (Sulfonamide Allergy  Nausea Verified 08/18/17 01:14





Antibiotics)     


 


famotidine [From Pepcid] AdvReac  Vomiting Verified 08/18/17 01:14











 Home Medications











 Medication  Instructions  Recorded  Confirmed  Last Taken  Type


 


Isosorbide Mononitrate [Isosorbide 120 mg PO DAILY 02/05/15 08/18/17 08/17/17 

History





Mononitrate ER (IMDUR)]     


 


traZODone [Desyrel] 100 mg PO QHS  tablet 02/07/16 08/18/17 08/16/17 Rx


 


Potassium Chloride [K-Dur] 20 meq PO BID #60 tab 03/20/16 08/18/17 08/17/17 Rx


 


amLODIPine [Norvasc] 5 mg PO QAM 04/26/17 08/18/17 08/17/17 History


 


ALPRAZolam [Xanax TAB] 0.5 mg PO Q8H PRN #15 tablet 04/27/17 08/18/17 08/17/17 

Rx


 


Aspirin EC [Aspirin Enteric Coated 325 mg PO QDAY #30 tablet 04/27/17 08/18/17 08/17/17 Rx





TAB]     


 


AtorvaSTATin [Lipitor] 80 mg PO QHS #30 tablet 04/27/17 08/18/17 08/17/17 Rx


 


Clopidogrel [Plavix] 75 mg PO DAILY #30 tablet 04/27/17 08/18/17 08/17/17 Rx


 


Gabapentin [Neurontin] 600 mg PO TID #90 capsule 04/27/17 08/18/17 08/17/17 Rx


 


Insulin NPH, Human [NovoLIN N] 35 unit SUB-Q QAM #30 units 04/27/17 08/18/17 08/ 17/17 Rx


 


Sertraline [Zoloft] 50 mg PO QDAY #30 tablet 04/27/17 08/18/17 08/17/17 Rx


 


Torsemide [Demadex] 100 mg PO QDAY #30 tablet 04/27/17 08/18/17 08/17/17 Rx


 


Insulin Glulisine [Apidra] 0 units SC QDAY 06/22/17 08/18/17 08/17/17 History


 


Levothyroxine [Synthroid] 100 mcg PO QDAY 06/22/17 08/18/17 08/17/17 History


 


Metoprolol Xl [Metoprolol 200 mg PO QDAY 06/22/17 08/18/17 08/17/17 History





SUCCINATE ER TAB]     


 


traMADol [Ultram 50 MG tab] 50 mg PO Q6HR PRN #20 tablet 06/24/17 08/18/17 08/17 /17 Rx











Active Meds: 


Active Medications





Acetaminophen (Tylenol)  650 mg PO Q4H PRN


   PRN Reason: Pain MILD(1-3)/Fever >100.5/HA


Alprazolam (Xanax)  0.5 mg PO Q8H PRN


   PRN Reason: Anxiety


Aspirin (Baby Aspirin)  81 mg PO QDAY [facility]


   Last Admin: 08/18/17 10:09 Dose:  81 mg


Atorvastatin Calcium (Lipitor)  80 mg PO QHS [facility]


Bisacodyl (Dulcolax)  10 mg DC QDAY PRN


   PRN Reason: Constipation unrelieved by MOM


Clopidogrel Bisulfate (Plavix)  75 mg PO DAILY [facility]


   Last Admin: 08/18/17 10:08 Dose:  75 mg


Dextrose (D50w (25gm))  50 ml IV PRN PRN


   PRN Reason: Hypoglycemia


Diphenhydramine HCl (Benadryl)  25 mg IV Q6H PRN


   PRN Reason: Itching


   Last Admin: 08/18/17 07:05 Dose:  25 mg


Enoxaparin Sodium (Lovenox)  40 mg SUB-Q QDAY@1000 [facility]


   Last Admin: 08/18/17 10:08 Dose:  40 mg


Furosemide (Lasix)  40 mg IV BID@0600,1800 [facility]


   Last Admin: 08/18/17 05:07 Dose:  40 mg


Gabapentin (Neurontin)  600 mg PO TID [facility]


   Last Admin: 08/18/17 07:47 Dose:  600 mg


Insulin Aspart (Novolog)  0 units SUB-Q ACHS [facility]


   PRN Reason: Protocol


   Last Admin: 08/18/17 12:12 Dose:  10 units


Insulin Aspart (Novolog)  5 units SUB-Q AC [facility]


   Last Admin: 08/18/17 12:12 Dose:  5 units


Insulin Human NPH (Novolin N)  35 unit SUB-Q QAMDIAB [facility]


   Last Admin: 08/18/17 08:51 Dose:  35 unit


Insulin Human NPH (Novolin N)  25 unit SUB-Q QPMDIAB [facility]


Levothyroxine Sodium (Synthroid)  100 mcg PO QDAY@0600 [facility]


   Last Admin: 08/18/17 05:06 Dose:  100 mcg


Lisinopril (Zestril)  2.5 mg PO QDAY [facility]


   Last Admin: 08/18/17 10:10 Dose:  2.5 mg


Magnesium Hydroxide (Milk Of Magnesia)  30 ml PO Q4H PRN


   PRN Reason: Constipation


Metoprolol Succinate (Toprol Xl)  25 mg PO QDAY [facility]


   Last Admin: 08/18/17 10:10 Dose:  25 mg


Morphine Sulfate (Morphine)  2 mg IV Q4H PRN


   PRN Reason: Pain, Moderate (4-6)


   Last Admin: 08/18/17 07:48 Dose:  2 mg


Ondansetron HCl (Zofran)  4 mg IV Q8H PRN


   PRN Reason: N/V unrelieved by Reglan


Sertraline HCl (Zoloft)  50 mg PO QDAY [facility]


   Last Admin: 08/18/17 10:09 Dose:  50 mg


Trazodone HCl (Desyrel)  100 mg PO QHS [facility]











Review of Systems


Constitutional: no weight loss, no weight gain, no fever, no chills, no sweats


Ears, nose, mouth and throat: no ear pain, no nose pain, no sinus pressure, no 

sinus pain


Cardiovascular: orthopnea, edema, shortness of breath, dyspnea on exertion, 

paroxysmal nocturnal dyspnea, leg edema, decreased exercise tolerance, no chest 

pain, no palpitations, no rapid/irregular heart beat, no syncope, no 

lightheadedness


Respiratory: no cough, no congestion, no wheezing, no pain on inspiration


Gastrointestinal: no abdominal pain, no nausea, no vomiting, no diarrhea, no 

constipation, no change in bowel habits


Genitourinary Female: no pelvic pain, no flank pain, no dysuria, no urinary 

frequency, no urgency


Musculoskeletal: no neck stiffness, no neck pain, no shooting arm pain, no arm 

numbness/tingling, no low back pain, no shooting leg pain, no leg numbness/

tingling, no redness of joints


Integumentary: no rash, no pruritis, no redness, no sores, no wounds


Neurological: no head injury, no paralysis, no weakness, no parathesias, no 

numbness, no tingling, no seizures, no syncope


Psychiatric: anxiety


Endocrine: no cold intolerance, no heat intolerance


Hematologic/Lymphatic: no easy bruising, no easy bleeding


Allergic/Immunologic: no urticaria, no wheezing, no persistent infections





Physical Examination


 Vital Signs











Temp Pulse Resp BP Pulse Ox


 


 98.6 F   72   20   114/68   100 


 


 08/17/17 18:21  08/17/17 18:21  08/17/17 18:21  08/17/17 18:21  08/17/17 18:21











General appearance: no acute distress


HEENT: Positive: PERRL, Normocephaly, Mucus Membranes Moist


Neck: Positive: neck supple, trachea midline


Cardiac: Positive: Reg Rate and Rhythm, S1/S2


Lungs: Positive: clear to auscultation


Neuro: Positive: Grossly Intact, Cranial Nerve 2-12 Intact


Abdomen: Positive: Unremarkable, Soft, Active Bowel Sounds.  Negative: Tender


Skin: Positive: Clear.  Negative: Rash, Wound


Musculoskeletal: No Pain, Normal Range of Motion


Extremities: Present: +1 Edema (BLE pitting)





Results





 08/17/17 18:33





 08/17/17 18:33


 Cardiac Enzymes











  08/18/17 Range/Units





  Unknown 


 


CK-MB (CK-2)  1.6  (0.0-4.0)  ng/mL














- Imaging and Cardiology


Cardiac cath: report reviewed ( on 6/13/2017 for NSTEMI --> 100% mid RCA ISR 

with successful balloon angioplasty followed by coronary stenting with JORDI but 

significant distal disease involving the PDA and the PLV that was not amenable 

to intervention.)


EKG: report reviewed (6/9/2017 demonstrated EF 20 - 25%, mild to moderate LV 

dilation, restrictive diastolic filling pattern, RA mildly dilated, mild to 

moderate MR, mild TR, mild pulmonary HTN, RVSP 43mmHg. )





EKG interpretations





- Telemetry


EKG Rhythm: Sinus Rhythm





- EKG


Sinus rhythms and dysrhythmias: sinus rhythm


Myocardial infarction: anterior MI (old age or i





Assessment and Plan


Assessment:


Acute on chronic systolic heart failure - cardioMEMS in situ


CAD, s/p MI and PCI - most recent PCI to RCA on 6/13/2017 @ [facility]


ICMP, s/p AICD


Chronic angina


CKD - Cr stable. 


HTN


DM


Obesity





Plan:


Agree with current cardiac management, including diuresis with IV lasix, 40mg 

BID. Repeat BMP in AM. 


Possible d/c home in AM. 


No indication for repeat echo/stress at this time. 


Assessment and plan reviewed with pt at bedside. 





The patient has been seen in conjunction with Dr. VAN Black who agrees with the 

assessment and plan of care.

## 2017-08-18 NOTE — XRAY REPORT
CHEST 2 VIEWS



INDICATION: Chest pain, shortness of breath.



COMPARISON: 6/22/2017



FINDINGS: PA and lateral chest radiographs demonstrate stable slight 

cardiomegaly with normal mediastinal and hilar contours.  Coronary 

calcifications and/or stents again noted.  Minimal fluid or thickening 

along the fissures also noted without significant pleural effusions or 

overt CHF.  Stable left AICD with single ventricular lead and a right 

chest port tip about the cavoatrial junction.  EKG leads.  Stable bones.



CONCLUSION: No acute chest process with various stable findings, as 

above.



Thank you for the opportunity to participate in this patient's care.

## 2017-08-18 NOTE — HISTORY AND PHYSICAL REPORT
History of Present Illness


Date of examination: 08/18/17


History of present illness: 


40-year-old woman with a history of hypertension, diabetes complicated by 

gastroparesis, coronary artery disease, CHF, hypothyroidism comes to the 

emergency room with complaints of shortness of breath, swelling in her legs and 

abdomen.  She has the sensor device placed in the pulmonary artery to monitor 

status of CHF.  She state her readings are supposed to be between 8-20.  

Yesterday her reading was 23, she was told by her cardiologist at [place] to take 

extra doses of torsemide, which she did.  Today readings increased to 26 

patient was told to come to the hospital for further evaluation


Review Of  Systems:


Constitutional: no fever,  chills, weight loss


Ears, eyes, nose, mouth and throat: no nasal congestion, no nasal discharge, no 

sinus pressure, blurry vision, diplopia


Neck: No neck pain or rigidity.


Cardiovascular: chest pain, orthopnea, palpitations


Respiratory: No cough


Gastrointestinal: abdominal pain, hematochezia


Genitourinary : no dysuria, frequency , hematuria


Musculoskeletal: no joint swelling or muscle ache 


Integumentary: no rash, no pruritis


Neurological: no parathesias, focal weakness


Endocrine: no cold or heat intolerance, no polyuria or polydipsia


Hematologic/Lymphatic: no easy bruising, no easy bleeding, no gland swelling


Allergic/Immunologic: no urticaria, no angioedema.





PAST SURGICAL HISTORY: tubal ligation, left salpingectomy, right knee surgery, 

right arm surgery, left eye surgery, AICD





SOCIAL HISTORY: Denies alcohol, tobacco, drugs





FAMILY HISTORY: Coronary artery disease











Medications and Allergies


 Allergies











Allergy/AdvReac Type Severity Reaction Status Date / Time


 


adhesive tape Allergy  Rash Verified 08/18/17 01:14


 


bupivacaine Allergy  Angioedema Verified 08/18/17 01:14


 


Sulfa (Sulfonamide Allergy  Nausea Verified 08/18/17 01:14





Antibiotics)     


 


famotidine [From Pepcid] AdvReac  Vomiting Verified 08/18/17 01:14











 Home Medications











 Medication  Instructions  Recorded  Confirmed  Last Taken  Type


 


Isosorbide Mononitrate [Isosorbide 120 mg PO DAILY 02/05/15 08/18/17 08/17/17 

History





Mononitrate ER (IMDUR)]     


 


traZODone [Desyrel] 100 mg PO QHS  tablet 02/07/16 08/18/17 08/16/17 Rx


 


Potassium Chloride [K-Dur] 20 meq PO BID #60 tab 03/20/16 08/18/17 08/17/17 Rx


 


amLODIPine [Norvasc] 5 mg PO QAM 04/26/17 08/18/17 08/17/17 History


 


ALPRAZolam [Xanax TAB] 0.5 mg PO Q8H PRN #15 tablet 04/27/17 08/18/17 08/17/17 

Rx


 


Aspirin EC [Aspirin Enteric Coated 325 mg PO QDAY #30 tablet 04/27/17 08/18/17 08/17/17 Rx





TAB]     


 


AtorvaSTATin [Lipitor] 80 mg PO QHS #30 tablet 04/27/17 08/18/17 08/17/17 Rx


 


Clopidogrel [Plavix] 75 mg PO DAILY #30 tablet 04/27/17 08/18/17 08/17/17 Rx


 


Gabapentin [Neurontin] 600 mg PO TID #90 capsule 04/27/17 08/18/17 08/17/17 Rx


 


Insulin NPH, Human [NovoLIN N] 35 unit SUB-Q QAM #30 units 04/27/17 08/18/17 08/ 17/17 Rx


 


Sertraline [Zoloft] 50 mg PO QDAY #30 tablet 04/27/17 08/18/17 08/17/17 Rx


 


Torsemide [Demadex] 100 mg PO QDAY #30 tablet 04/27/17 08/18/17 08/17/17 Rx


 


Insulin Glulisine [Apidra] 0 units SC QDAY 06/22/17 08/18/17 08/17/17 History


 


Levothyroxine [Synthroid] 100 mcg PO QDAY 06/22/17 08/18/17 08/17/17 History


 


Metoprolol Xl [Metoprolol 200 mg PO QDAY 06/22/17 08/18/17 08/17/17 History





SUCCINATE ER TAB]     


 


traMADol [Ultram 50 MG tab] 50 mg PO Q6HR PRN #20 tablet 06/24/17 08/18/17 08/17 /17 Rx














Exam





- Physical Exam


Narrative exam: 


Gen. appearance: Patient lying in bed, no apparent distress


HEENT: Normocephalic, atraumatic, pupils equally round and reactive to light, 

extraocular movement intact, and no sclericterus,. No JVD or thyromegaly or 

nodule,neck supple, no carotid bruit ,mucous membranes moist, no exudate or 

erythema


Heart: S1, S2, regular rate and rhythm


Lungs crackles bilaterally, breathing comfortable


Abdomen: Positive bowel sounds, nontender, nondistended, no organomegaly


Extremity: 1+edema, cyanosis, clubbing


Skin: No rash, nodules, warm, dry


Neuro: Oriented 3, cranial nerves II-12 intact, speech is fluent, motor and 

sensory intact





- Constitutional


Vitals: 


 











Temp Pulse Resp BP Pulse Ox


 


 98.6 F   73   16   114/68   96 


 


 08/17/17 18:21  08/18/17 03:00  08/18/17 03:00  08/18/17 03:00  08/18/17 03:00














Results





- Labs


CBC & Chem 7: 


 08/19/17 04:00





 08/19/17 04:00


Labs: 


 Abnormal lab results











  08/17/17 08/17/17 08/18/17 Range/Units





  18:33 18:33 Unknown 


 


Lymph % (Auto)  39.5 H    (13.4-35.0)  %


 


Chloride   97.3 L   ()  mmol/L


 


BUN   24 H   (7-17)  mg/dL


 


Creatinine   1.4 H   (0.7-1.2)  mg/dL


 


Glucose   206 H   ()  mg/dL


 


NT-Pro-B Natriuret Pep    1923 H  (0-450)  pg/mL














- Imaging and Cardiology


EKG: image reviewed


Chest x-ray: image reviewed





Assessment and Plan


Assessment


CHF, acute on chronic systolic, EF 25%


Coronary artery disease


Hypertension


Diabetes


Hypothyroidism


Plan


Admit to medicine


Diuresed with IV Lasix, monitor I's and O's, daily weights


start Beta blocker, aspirin,no  ACE inhibitor due to elevated creatinine


Check cardiac enzymes, consult cardiology, recent echo in June of this year


Continue cardiac medications, check fingersticks initiated insulin sliding scale


Start DVT prophylaxis

## 2017-08-19 VITALS — DIASTOLIC BLOOD PRESSURE: 74 MMHG | SYSTOLIC BLOOD PRESSURE: 122 MMHG

## 2017-08-19 LAB
ANION GAP SERPL CALC-SCNC: 19 MMOL/L
BASOPHILS NFR BLD AUTO: 0.4 % (ref 0–1.8)
BUN SERPL-MCNC: 20 MG/DL (ref 7–17)
BUN/CREAT SERPL: 20 %
CALCIUM SERPL-MCNC: 8.7 MG/DL (ref 8.4–10.2)
CHLORIDE SERPL-SCNC: 91.3 MMOL/L (ref 98–107)
CK SERPL-CCNC: 39 UNITS/L (ref 30–135)
CO2 SERPL-SCNC: 27 MMOL/L (ref 22–30)
EOSINOPHIL NFR BLD AUTO: 3.9 % (ref 0–4.3)
GLUCOSE SERPL-MCNC: 383 MG/DL (ref 65–100)
HCT VFR BLD CALC: 39.1 % (ref 30.3–42.9)
HGB BLD-MCNC: 12.8 GM/DL (ref 10.1–14.3)
MCH RBC QN AUTO: 31 PG (ref 28–32)
MCHC RBC AUTO-ENTMCNC: 33 % (ref 30–34)
MCV RBC AUTO: 95 FL (ref 79–97)
PLATELET # BLD: 218 K/MM3 (ref 140–440)
POTASSIUM SERPL-SCNC: 4.1 MMOL/L (ref 3.6–5)
RBC # BLD AUTO: 4.1 M/MM3 (ref 3.65–5.03)
SODIUM SERPL-SCNC: 133 MMOL/L (ref 137–145)
WBC # BLD AUTO: 8.5 K/MM3 (ref 4.5–11)

## 2017-08-19 RX ADMIN — ENOXAPARIN SODIUM SCH MG: 100 INJECTION SUBCUTANEOUS at 09:34

## 2017-08-19 RX ADMIN — INSULIN ASPART SCH UNITS: 100 INJECTION, SOLUTION INTRAVENOUS; SUBCUTANEOUS at 08:47

## 2017-08-19 RX ADMIN — MORPHINE SULFATE PRN MG: 2 INJECTION, SOLUTION INTRAMUSCULAR; INTRAVENOUS at 01:55

## 2017-08-19 RX ADMIN — LISINOPRIL SCH MG: 5 TABLET ORAL at 09:36

## 2017-08-19 RX ADMIN — SERTRALINE SCH MG: 50 TABLET, FILM COATED ORAL at 09:35

## 2017-08-19 RX ADMIN — LEVOTHYROXINE SODIUM SCH MCG: 0.1 TABLET ORAL at 05:14

## 2017-08-19 RX ADMIN — ASPIRIN SCH MG: 81 TABLET, CHEWABLE ORAL at 09:35

## 2017-08-19 RX ADMIN — INSULIN ASPART SCH UNITS: 100 INJECTION, SOLUTION INTRAVENOUS; SUBCUTANEOUS at 08:45

## 2017-08-19 RX ADMIN — DIPHENHYDRAMINE HYDROCHLORIDE PRN MG: 50 INJECTION, SOLUTION INTRAMUSCULAR; INTRAVENOUS at 01:55

## 2017-08-19 RX ADMIN — FUROSEMIDE SCH MG: 10 INJECTION, SOLUTION INTRAVENOUS at 05:14

## 2017-08-19 RX ADMIN — CLOPIDOGREL BISULFATE SCH MG: 75 TABLET ORAL at 09:34

## 2017-08-19 RX ADMIN — MORPHINE SULFATE PRN MG: 2 INJECTION, SOLUTION INTRAMUSCULAR; INTRAVENOUS at 08:44

## 2017-08-19 RX ADMIN — METOPROLOL SUCCINATE SCH MG: 25 TABLET, FILM COATED, EXTENDED RELEASE ORAL at 09:35

## 2017-08-19 RX ADMIN — GABAPENTIN SCH MG: 300 CAPSULE ORAL at 08:44

## 2017-08-19 RX ADMIN — DIPHENHYDRAMINE HYDROCHLORIDE PRN MG: 50 INJECTION, SOLUTION INTRAMUSCULAR; INTRAVENOUS at 08:45

## 2017-08-19 NOTE — DISCHARGE SUMMARY
Providers





- Providers


Date of Admission: 


08/18/17 03:23





Date of discharge: 08/19/17


Attending physician: 


ILDEFONSO CAMP MD





 





08/18/17 08:17


Consult to Physician [CONS] Routine 


   Consulting Provider: ANGELINE ROMERO


   Reason For Exam: chf


   Place consult to:: Dr. Romero


   Notified:: Cally OLIVA


   Phone number called:: [phone]


   Was contact made?: Yes


   If yes, spoke with:: Anita-answering service


   Time called:: 08:11





08/18/17 09:00


Consult to Dietitian/Nutrition [CONS] Routine 


   Physician Instructions: 


   Reason For Exam: 


   Reason for Consult: Diet education











Primary care physician: 


KAYCEE NEELY








Hospitalization


Reason for admission: Acute on chronic CHF exacerbation


Condition: Stable


Hospital course: 





40-year-old woman with a history of hypertension, diabetes complicated by 

gastroparesis, coronary artery disease, CHF, hypothyroidism comes to the 

emergency room with complaints of shortness of breath, swelling in her legs and 

abdomen.  She has the sensor device placed in the pulmonary artery to monitor 

status of CHF.  She state her readings are supposed to be between 8-20.  

Yesterday her reading was 23, she was told by her cardiologist at [place] to take 

extra doses of torsemide, which she did.  Today readings increased to 26 

patient was told to come to the hospital for further evaluation.


  Patient was admitted to the floor for the management of acute on chronic 

diastolic CHF exacerbation and was treated with IV diuretics and resumed his 

home medications, Cardiology was consulted and recommend no cardiac work up at 

this time. patient had recent cardiac work up at [place]. Patient was 

hemodynamically stable at the time of discharge. Medications were reviewed. 

patient was discharged home in a stable condition.


Disposition: [id number] TO HOME OR SELFCARE


Time spent for discharge: 31 minutes





- Discharge Diagnoses


(1) Acute exacerbation of CHF (congestive heart failure)


Status: Acute   


Qualifiers: 


   Congestive heart failure type: C 





(2) Chest pain


Status: Acute   


Qualifiers: 


   Chest pain type: C   Ischemic chest pain type: I 





Core Measure Documentation





- Palliative Care


Palliative Care/ Comfort Measures: Not Applicable





- Core Measures


Any of the following diagnoses?: heart failure





- Heart Failure Discharge Requirements


ACE/ARB for LVSD if EF <40%: Yes


Beta blocker at discharge: Yes





Exam





- Constitutional


Vitals: 


 











Temp Pulse Resp BP Pulse Ox


 


 98.3 F   67   20   122/74   96 


 


 08/19/17 05:48  08/19/17 05:48  08/19/17 05:48  08/19/17 05:48  08/19/17 05:48











General appearance: Present: no acute distress, well-nourished





- EENT


Eyes: Present: PERRL


ENT: clear oral mucosa





- Neck


Neck: Present: supple, normal ROM





- Respiratory


Respiratory effort: normal


Respiratory: bilateral: CTA





- Cardiovascular


Heart Sounds: Present: S1 & S2





- Extremities


Extremities: no ischemia, No edema, Full ROM


Peripheral Pulses: within normal limits





- Abdominal


General gastrointestinal: Present: soft, non-tender, non-distended, normal 

bowel sounds





- Integumentary


Integumentary: Present: clear, warm





- Musculoskeletal


Musculoskeletal: strength equal bilaterally





- Psychiatric


Psychiatric: appropriate mood/affect





- Neurologic


Neurologic: CNII-XII intact, gait normal





- Allied Health


Allied health notes reviewed: nursing





Plan


Activity: no restrictions


Weight Bearing Status: Full Weight Bearing


Diet: low cholesterol, low salt, diabetic


Follow up with: 


KAYCEE NEELY MD [Primary Care Provider] - 7 Days


Forms:  Work/School Release Form

## 2017-10-12 ENCOUNTER — HOSPITAL ENCOUNTER (EMERGENCY)
Dept: HOSPITAL 5 - ED | Age: 42
Discharge: LEFT BEFORE DISCHARGE | End: 2017-10-12
Payer: MEDICARE

## 2017-10-12 VITALS — SYSTOLIC BLOOD PRESSURE: 120 MMHG | DIASTOLIC BLOOD PRESSURE: 79 MMHG

## 2017-10-12 DIAGNOSIS — Z53.21: ICD-10-CM

## 2017-10-12 DIAGNOSIS — R07.9: Primary | ICD-10-CM

## 2017-10-12 PROCEDURE — 93010 ELECTROCARDIOGRAM REPORT: CPT

## 2017-10-12 PROCEDURE — 82962 GLUCOSE BLOOD TEST: CPT

## 2017-10-12 PROCEDURE — 93005 ELECTROCARDIOGRAM TRACING: CPT

## 2017-12-24 ENCOUNTER — HOSPITAL ENCOUNTER (INPATIENT)
Dept: HOSPITAL 5 - ED | Age: 42
LOS: 1 days | Discharge: HOME | DRG: 205 | End: 2017-12-25
Attending: INTERNAL MEDICINE | Admitting: INTERNAL MEDICINE
Payer: MEDICARE

## 2017-12-24 DIAGNOSIS — I50.22: ICD-10-CM

## 2017-12-24 DIAGNOSIS — I25.118: ICD-10-CM

## 2017-12-24 DIAGNOSIS — K21.9: ICD-10-CM

## 2017-12-24 DIAGNOSIS — Z79.899: ICD-10-CM

## 2017-12-24 DIAGNOSIS — E11.22: ICD-10-CM

## 2017-12-24 DIAGNOSIS — N18.3: ICD-10-CM

## 2017-12-24 DIAGNOSIS — Z79.4: ICD-10-CM

## 2017-12-24 DIAGNOSIS — E11.319: ICD-10-CM

## 2017-12-24 DIAGNOSIS — Z90.721: ICD-10-CM

## 2017-12-24 DIAGNOSIS — Z88.2: ICD-10-CM

## 2017-12-24 DIAGNOSIS — E11.00: ICD-10-CM

## 2017-12-24 DIAGNOSIS — E11.40: ICD-10-CM

## 2017-12-24 DIAGNOSIS — E78.5: ICD-10-CM

## 2017-12-24 DIAGNOSIS — Z98.51: ICD-10-CM

## 2017-12-24 DIAGNOSIS — Z95.810: ICD-10-CM

## 2017-12-24 DIAGNOSIS — Z95.5: ICD-10-CM

## 2017-12-24 DIAGNOSIS — Z82.49: ICD-10-CM

## 2017-12-24 DIAGNOSIS — E03.9: ICD-10-CM

## 2017-12-24 DIAGNOSIS — M94.0: Primary | ICD-10-CM

## 2017-12-24 DIAGNOSIS — I13.0: ICD-10-CM

## 2017-12-24 DIAGNOSIS — I25.2: ICD-10-CM

## 2017-12-24 LAB
ANION GAP SERPL CALC-SCNC: 21 MMOL/L
APTT BLD: 37.5 SEC. (ref 24.2–36.6)
BUN SERPL-MCNC: 24 MG/DL (ref 7–17)
BUN/CREAT SERPL: 20 %
CALCIUM SERPL-MCNC: 8.1 MG/DL (ref 8.4–10.2)
CHLORIDE SERPL-SCNC: 95.6 MMOL/L (ref 98–107)
CO2 SERPL-SCNC: 23 MMOL/L (ref 22–30)
EOSINOPHIL NFR BLD AUTO: 1.5 % (ref 0–4.3)
GLUCOSE SERPL-MCNC: 536 MG/DL (ref 65–100)
HCT VFR BLD CALC: 39.3 % (ref 30.3–42.9)
HGB BLD-MCNC: 12.9 GM/DL (ref 10.1–14.3)
INR PPP: 0.81 (ref 0.87–1.13)
MCH RBC QN AUTO: 32 PG (ref 28–32)
MCHC RBC AUTO-ENTMCNC: 33 % (ref 30–34)
MCV RBC AUTO: 97 FL (ref 79–97)
PLATELET # BLD: 233 K/MM3 (ref 140–440)
POTASSIUM SERPL-SCNC: 3.9 MMOL/L (ref 3.6–5)
RBC # BLD AUTO: 4.06 M/MM3 (ref 3.65–5.03)
SODIUM SERPL-SCNC: 136 MMOL/L (ref 137–145)
WBC # BLD AUTO: 7.1 K/MM3 (ref 4.5–11)

## 2017-12-24 PROCEDURE — 82805 BLOOD GASES W/O2 SATURATION: CPT

## 2017-12-24 PROCEDURE — 84443 ASSAY THYROID STIM HORMONE: CPT

## 2017-12-24 PROCEDURE — 96374 THER/PROPH/DIAG INJ IV PUSH: CPT

## 2017-12-24 PROCEDURE — 83880 ASSAY OF NATRIURETIC PEPTIDE: CPT

## 2017-12-24 PROCEDURE — 82962 GLUCOSE BLOOD TEST: CPT

## 2017-12-24 PROCEDURE — 82550 ASSAY OF CK (CPK): CPT

## 2017-12-24 PROCEDURE — 96376 TX/PRO/DX INJ SAME DRUG ADON: CPT

## 2017-12-24 PROCEDURE — 82553 CREATINE MB FRACTION: CPT

## 2017-12-24 PROCEDURE — 93010 ELECTROCARDIOGRAM REPORT: CPT

## 2017-12-24 PROCEDURE — 80053 COMPREHEN METABOLIC PANEL: CPT

## 2017-12-24 PROCEDURE — 85025 COMPLETE CBC W/AUTO DIFF WBC: CPT

## 2017-12-24 PROCEDURE — 36415 COLL VENOUS BLD VENIPUNCTURE: CPT

## 2017-12-24 PROCEDURE — 93005 ELECTROCARDIOGRAM TRACING: CPT

## 2017-12-24 PROCEDURE — 71010: CPT

## 2017-12-24 PROCEDURE — 85610 PROTHROMBIN TIME: CPT

## 2017-12-24 PROCEDURE — 96375 TX/PRO/DX INJ NEW DRUG ADDON: CPT

## 2017-12-24 PROCEDURE — 84484 ASSAY OF TROPONIN QUANT: CPT

## 2017-12-24 PROCEDURE — 85730 THROMBOPLASTIN TIME PARTIAL: CPT

## 2017-12-24 PROCEDURE — 80048 BASIC METABOLIC PNL TOTAL CA: CPT

## 2017-12-24 PROCEDURE — 83036 HEMOGLOBIN GLYCOSYLATED A1C: CPT

## 2017-12-24 PROCEDURE — 85379 FIBRIN DEGRADATION QUANT: CPT

## 2017-12-24 RX ADMIN — POTASSIUM CHLORIDE SCH MEQ: 1500 TABLET, EXTENDED RELEASE ORAL at 23:55

## 2017-12-24 NOTE — HISTORY AND PHYSICAL REPORT
History of Present Illness


Date of examination: 12/24/17


Date of admission: 


12/24/2017





Chief complaint: 


Chief complaint: Chest pain of 2 days' duration intermittent in nature.


History of present illness: 


History of present illness:


41 y/o  female presents with chest pain of 2 days' duration.Chest pain 

is intermittent in nature.pain is about 8 on a scale of 1-10.  No diaphoresis 

no shortness of breath no palpitations.


Patient is a 42-year-old woman with a history significant premature coronary 

artery disease, AMI with CAD S/P PROX LAD STENT, (LHC 10/14:  LEFT MAIN:  LI, 

LAD:  patent stent, D1:  100% stenosis, LCX:  LI, OM2:  90% stenosis, RCA:  

distal 40%, PDA:  50%), (PET SCAN 8/14:  ischemia with some evidence of 

infarction in the basal anterior lateral wall, infarction in apical and mid 

ventricualr inferior lateral wall, EF rest:  28%, stress:  30%), Chronic stable 

angina, Chronic systolic heart failure EF 30-35%, (ECHO:  2/15:  EF 30-35%, 

trace MR, mild LAE), [facility] [facility] ICD, hypothyroidism CKD 3, insulin-dependent 

diabetes mellitus with gastroparesis, hypertension, major depressive disorder 

and dyslipidemia, recent PCI to RCA on 6/13/2017 @ [place] followed regularly by 

[facility], Dr. Anderson. She presented with c/o chest pain. She reports 

compliance with her medications.  Chest x-ray read as no acute findings.





Review of System:


Constitutional: no fever, no chills, no weight loss


Ears, eyes, nose, mouth and throat: no nasal congestion, no nasal discharge, no 

sinus pressure, no vision change, no red eye.


Neck: No neck pain or rigidity.


Cardiovascular:  chest pain2 days, no orthopnea, no palpitations, no leg 

swelling


Respiratory: No shortness of breath, no cough, no congestion, no wheezing


Gastrointestinal: no abdominal pain, no nausea, no vomiting


Genitourinary : no dysuria, no hematuria


Musculoskeletal: no joint swelling or muscle ache 


Integumentary: no rash, no pruritis


Neurological: no parathesias, no numbness, no tingling


Endocrine: no cold or heat intolerance, no polyuria or polydipsia


Hematologic/Lymphatic: no easy bruising, no easy bleeding, no gland swelling


Allergic/Immunologic: no urticaria, no angioedema.








 Surgical History


Hx Coronary Stent: Yes (15)


Hx Pacemaker: Yes


Hx Internal Defibrillator: Yes


Additional Surgical History: tubal ligation, left fallopian tube removed, right 

knee surgery, right arm surgery, left eye surgeryPOWER PORT,Pacemaker  

DEFIBRILATOR Right ring finger surgery











Past History


Past Medical History: acute MI, CAD, diabetes, GERD, hypertension, 

hyperlipidemia, hypothyroidism, other (Generalized anxiety disorder)


Social history: lives with family, full code.  denies: smoking, alcohol abuse


Family history: CAD, hypertension





Medications and Allergies


 Allergies











Allergy/AdvReac Type Severity Reaction Status Date / Time


 


adhesive tape Allergy  Rash Verified 10/12/17 11:13


 


bupivacaine Allergy  Angioedema Verified 10/12/17 11:13


 


Sulfa (Sulfonamide Allergy  Nausea Verified 10/12/17 11:13





Antibiotics)     


 


famotidine [From Pepcid] AdvReac  Vomiting Verified 10/12/17 11:13











 Home Medications











 Medication  Instructions  Recorded  Confirmed  Last Taken  Type


 


Isosorbide Mononitrate [Isosorbide 120 mg PO DAILY 02/05/15 09/29/17 2 Days Ago 

History





Mononitrate ER (IMDUR)]    ~09/27/17 


 


Potassium Chloride [K-Dur] 20 meq PO BID #60 tab 03/20/16 09/29/17 2 Days Ago Rx





    ~09/27/17 


 


amLODIPine [Norvasc] 5 mg PO QAM 04/26/17 09/29/17 2 Days Ago History





    ~09/27/17 


 


ALPRAZolam [Xanax TAB] 0.5 mg PO Q8H PRN #15 tablet 04/27/17 09/29/17 2 Days 

Ago Rx





    ~09/27/17 


 


Insulin NPH, Human [NovoLIN N] 35 unit SUB-Q QAM #30 units 04/27/17 09/29/17 2 

Days Ago Rx





    ~09/27/17 


 


Sertraline [Zoloft] 50 mg PO QDAY #30 tablet 04/27/17 09/29/17 2 Days Ago Rx





    ~09/27/17 


 


Torsemide [Demadex] 100 mg PO QDAY #30 tablet 04/27/17 09/29/17 2 Days Ago Rx





    ~09/27/17 


 


Insulin Glulisine [Apidra] 0 units SC QDAY 06/22/17 09/29/17 2 Days Ago History





    ~09/27/17 


 


Levothyroxine [Synthroid] 100 mcg PO QDAY 06/22/17 09/29/17 2 Days Ago History





    ~09/27/17 


 


Aspirin [Aspirin BABY CHEW TAB] 81 mg PO QDAY  tab.chew 08/30/17 09/29/17 2 

Days Ago Rx





    ~09/27/17 


 


AtorvaSTATin [Lipitor] 80 mg PO QHS  tablet 08/30/17 09/29/17 2 Days Ago Rx





    ~09/27/17 


 


Cholecalciferol Vit D3 [Vitamin D3] 1,000 unit PO QDAY  tablet 08/30/17 09/29/ 17 2 Days Ago Rx





    ~09/27/17 


 


Clopidogrel [Plavix] 75 mg PO QDAY  tablet 08/30/17 09/29/17 2 Days Ago Rx





    ~09/27/17 


 


Ezetimibe [Zetia] 10 mg PO QDAY  tablet 08/30/17 09/29/17 2 Days Ago Rx





    ~09/27/17 


 


Ferrous Sulfate [Feosol 325 MG tab] 325 mg PO QDAY  tablet 08/30/17 09/29/17 2 

Days Ago Rx





    ~09/27/17 


 


Gabapentin [Neurontin] 600 mg PO TID  capsule 08/30/17 09/29/17 2 Days Ago Rx





    ~09/27/17 


 


Spironolactone [Aldactone] 50 mg PO QDAY  tablet 08/30/17 09/29/17 2 Days Ago Rx





    ~09/27/17 


 


traZODone [Desyrel] 100 mg PO QHS  tablet 08/30/17 09/29/17 3 Days Ago Rx





    ~09/26/17 


 


Cephalexin [Keflex] 500 mg PO Q8HR #15 cap 10/03/17  Unknown Rx


 


Metoprolol Xl [Metoprolol 25 mg PO QDAY #30 tablet 10/03/17  Unknown Rx





SUCCINATE ER TAB]     


 


oxyCODONE /ACETAMINOPHEN [Percocet 1 tab PO Q6HR PRN #14 tablet 10/03/17  

Unknown Rx





5/325]     


 


traMADol [Ultram 50 MG tab] 50 mg PO Q6HR PRN #7 tablet 10/03/17  Unknown Rx


 


traZODone [Desyrel] 100 mg PO QHS #1 tablet 10/03/17  Unknown Rx














Exam





- Physical Exam


Narrative exam: 


lying in bed comfortably








- Constitutional


Vitals: 


 











Temp Pulse Resp BP Pulse Ox


 


 97.7 F   68   14   126/85   97 


 


 12/24/17 20:49  12/24/17 20:40  12/24/17 20:40  12/24/17 20:40  12/24/17 20:40











General appearance: Present: no acute distress, well-nourished





- EENT


Eyes: Present: PERRL


ENT: hearing intact, clear oral mucosa





- Neck


Neck: Present: supple, normal ROM





- Respiratory


Respiratory effort: normal


Respiratory: bilateral: CTA





- Cardiovascular


Heart rate: 70


Rhythm: regular


Heart Sounds: Present: S1 & S2.  Absent: rub, click





- Extremities


Extremities: no ischemia, pulses intact, pulses symmetrical, No edema


Peripheral Pulses: within normal limits





- Abdominal


General gastrointestinal: Present: soft, non-tender, non-distended, normal 

bowel sounds


Female genitourinary: Present: normal





- Integumentary


Integumentary: Present: clear, warm, dry





- Musculoskeletal


Musculoskeletal: gait normal, strength equal bilaterally





- Psychiatric


Psychiatric: appropriate mood/affect, intact judgment & insight





- Neurologic


Neurologic: CNII-XII intact, moves all extremities





- Allied Health


Allied health notes reviewed: nursing, case management





Results





- Labs


CBC & Chem 7: 


 12/24/17 18:30





 12/24/17 18:30


Labs: 


 Laboratory Last Values











WBC  7.1 K/mm3 (4.5-11.0)   12/24/17  18:30    


 


RBC  4.06 M/mm3 (3.65-5.03)   12/24/17  18:30    


 


Hgb  12.9 gm/dl (10.1-14.3)   12/24/17  18:30    


 


Hct  39.3 % (30.3-42.9)   12/24/17  18:30    


 


MCV  97 fl (79-97)   12/24/17  18:30    


 


MCH  32 pg (28-32)   12/24/17  18:30    


 


MCHC  33 % (30-34)   12/24/17  18:30    


 


RDW  15.6 % (13.2-15.2)  H  12/24/17  18:30    


 


Plt Count  233 K/mm3 (140-440)   12/24/17  18:30    


 


Lymph % (Auto)  28.7 % (13.4-35.0)   12/24/17  18:30    


 


Mono % (Auto)  7.1 % (0.0-7.3)   12/24/17  18:30    


 


Eos % (Auto)  1.5 % (0.0-4.3)   12/24/17  18:30    


 


Baso % (Auto)  Np   12/24/17  18:30    


 


Lymph #  2.0 K/mm3 (1.2-5.4)   12/24/17  18:30    


 


Mono #  0.5 K/mm3 (0.0-0.8)   12/24/17  18:30    


 


Eos #  0.1 K/mm3 (0.0-0.4)   12/24/17  18:30    


 


Baso #  0.0 K/mm3 (0.0-0.1)   12/24/17  18:30    


 


Seg Neutrophils %  62.3 % (40.0-70.0)   12/24/17  18:30    


 


Seg Neutrophils #  4.4 K/mm3 (1.8-7.7)   12/24/17  18:30    


 


PT  11.6 Sec. (12.2-14.9)  L  12/24/17  18:30    


 


INR  0.81  (0.87-1.13)  L  12/24/17  18:30    


 


APTT  37.5 Sec. (24.2-36.6)  H  12/24/17  18:30    


 


D-Dimer  219.99 ng/mlDDU (0-234)   12/24/17  18:30    


 


VBG pH  7.391  (7.320-7.420)   12/24/17  19:35    


 


Sodium  136 mmol/L (137-145)  L  12/24/17  18:30    


 


Potassium  3.9 mmol/L (3.6-5.0)   12/24/17  18:30    


 


Chloride  95.6 mmol/L ()  L  12/24/17  18:30    


 


Carbon Dioxide  23 mmol/L (22-30)   12/24/17  18:30    


 


Anion Gap  21 mmol/L  12/24/17  18:30    


 


BUN  24 mg/dL (7-17)  H  12/24/17  18:30    


 


Creatinine  1.2 mg/dL (0.7-1.2)   12/24/17  18:30    


 


Estimated GFR  49 ml/min  12/24/17  18:30    


 


BUN/Creatinine Ratio  20 %  12/24/17  18:30    


 


Glucose  536 mg/dL ()  H*  12/24/17  18:30    


 


POC Glucose  392  ()  H  12/24/17  20:44    


 


Calcium  8.1 mg/dL (8.4-10.2)  L  12/24/17  18:30    


 


Troponin T  < 0.010 ng/mL (0.00-0.029)   12/24/17  21:05    


 


NT-Pro-B Natriuret Pep  1616 pg/mL (0-450)  H  12/24/17  18:30    


 


TSH  11.340 mlU/mL (0.270-4.200)  H  12/24/17  18:30    








 Short CBC











  12/24/17 Range/Units





  18:30 


 


WBC  7.1  (4.5-11.0)  K/mm3


 


Hgb  12.9  (10.1-14.3)  gm/dl


 


Hct  39.3  (30.3-42.9)  %


 


Plt Count  233  (140-440)  K/mm3








 BMP











  12/24/17





  18:30


 


Sodium  136 L


 


Potassium  3.9


 


Chloride  95.6 L


 


Carbon Dioxide  23


 


BUN  24 H


 


Creatinine  1.2


 


Glucose  536 H*


 


Calcium  8.1 L








 Cardiac Enzymes











  12/24/17 12/24/17 Range/Units





  18:30 21:05 


 


Troponin T  < 0.010  < 0.010  (0.00-0.029)  ng/mL














- Imaging and Cardiology


EKG: report reviewed (normal sinus rhythm ventricular premature complexes.  

Nonspecific intraventricular conduction delay)


Chest x-ray: report reviewed (no acute findings)





Assessment and Plan


Advance Directives: Yes (full code)


VTE prophylaxis?: Chemical


Plan of care discussed with patient/family: Yes





- Patient Problems


(1) Acute chest pain


Current Visit: No   Status: Acute   


Plan to address problem: 


consistent with stable angina.  Patient had multiple Lexiscan's .  Patient to 

be managed medically.  We will get serial cardiac enzymes.  No Lexiscan 

ordered.  Cardiology consult requested.  Symptomatic treatment.








(2) Hyperosmolar non-ketotic state in patient with type 2 diabetes mellitus


Current Visit: Yes   Status: Acute   


Plan to address problem: 


High-dose sliding scale at this time.Accu-Cheks before meals and at bedt.  We'

ll increase the frequency of  blood sugars if not coming down.  Patient's 

insulin to be adjusted upward.








(3) Coronary artery disease


Current Visit: Yes   Status: Chronic   


Qualifiers: 


   Coronary Disease-Associated Artery/Lesion type: native artery   Native vs. 

transplanted heart: native heart   Associated angina: with stable angina   

Qualified Code(s): I25.118 - Atherosclerotic heart disease of native coronary 

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   


Plan to address problem: 


patient to be continued on Plavix and statins/zetia








(4) Hyperlipidemia


Current Visit: Yes   Status: Chronic   


Qualifiers: 


   Hyperlipidemia type: mixed hyperlipidemia   Qualified Code(s): E78.2 - Mixed 

hyperlipidemia   


Plan to address problem: 


continue statins and Zetia








(5) Hypothyroidism (acquired)


Current Visit: Yes   Status: Chronic   


Plan to address problem: 


TSH high.  Levothyroxine dosage increase from 100 mcg to 125 mcg








(6) CHF (congestive heart failure)


Current Visit: Yes   Status: Chronic   


Qualifiers: 


   Congestive heart failure type: combined 


Plan to address problem: 


continue torsemide








(7) DVT prophylaxis


Current Visit: Yes   Status: Acute   


Plan to address problem: 


patient on Lovenox.

## 2017-12-24 NOTE — XRAY REPORT
FINAL REPORT



EXAM:  XR CHEST 1V AP



HISTORY:  CP 



TECHNIQUE:  Chest, portable upright



PRIORS:  9/29/2017



FINDINGS:  

There is a Port-A-Cath on the right, not changed. Pacemaker on

the left is unchanged. Heart size is upper normal. There is no

pulmonary vascular congestion, infiltrate or pleural effusion

seen. There no pneumothorax seen.



IMPRESSION:  

There is no acute abnormality identified.

## 2017-12-24 NOTE — EMERGENCY DEPARTMENT REPORT
ED Chest Pain HPI





- General


Chief Complaint: Chest Pain


Stated Complaint: SEIZURE


Time Seen by Provider: 12/24/17 18:10


Source: patient


Mode of arrival: Ambulatory


Limitations: No Limitations





- History of Present Illness


Initial Comments: 


This is a 42-year-old  female presents to the emergency Department 

from home by EMS with complaint of some left-sided chest pain with radiation 

towards the back and shoulder.  It occurred yesterday but went away on its own 

but then started again this morning and has been getting progressive worse.  It 

is associated with some shortness of breath and some swelling that she says is 

around the abdomen.  She says that she has a history of a "lethal arrhythmia" 

that occurred last week that was found after her defibrillator went off and was 

interrogated by [facility].  She was at [facility] for this event and said 

it was secondary to dehydration at that time.  She called her cardiologist today

, Dr. Pinozn at [facility], and was told to come to the emergency department.  Her 

primary care physician is Dr. Gilmar Franklin.  She has a past medical history of 

CHF, diabetes, coronary artery disease with 6 MI and 15 stents, stage II 

chronic kidney disease, gastroparesis, hypothyroidism.  She took 2 

nitroglycerin prior to the ambulance arrival and got a full dose aspirin in 

route.  No recent travel or sick contacts at home.








- Related Data


 Home Medications











 Medication  Instructions  Recorded  Confirmed  Last Taken


 


Isosorbide Mononitrate [Isosorbide 120 mg PO DAILY 02/05/15 09/29/17 2 Days Ago





Mononitrate ER (IMDUR)]    ~09/27/17


 


amLODIPine [Norvasc] 5 mg PO QAM 04/26/17 09/29/17 2 Days Ago





    ~09/27/17


 


Insulin Glulisine [Apidra] 0 units SC QDAY 06/22/17 09/29/17 2 Days Ago





    ~09/27/17


 


Levothyroxine [Synthroid] 100 mcg PO QDAY 06/22/17 09/29/17 2 Days Ago





    ~09/27/17








 Previous Rx's











 Medication  Instructions  Recorded  Last Taken  Type


 


Potassium Chloride [K-Dur] 20 meq PO BID #60 tab 03/20/16 2 Days Ago Rx





   ~09/27/17 


 


ALPRAZolam [Xanax TAB] 0.5 mg PO Q8H PRN #15 tablet 04/27/17 2 Days Ago Rx





   ~09/27/17 


 


Insulin NPH, Human [NovoLIN N] 35 unit SUB-Q QAM #30 units 04/27/17 2 Days Ago 

Rx





   ~09/27/17 


 


Sertraline [Zoloft] 50 mg PO QDAY #30 tablet 04/27/17 2 Days Ago Rx





   ~09/27/17 


 


Torsemide [Demadex] 100 mg PO QDAY #30 tablet 04/27/17 2 Days Ago Rx





   ~09/27/17 


 


Aspirin [Aspirin BABY CHEW TAB] 81 mg PO QDAY  tab.chew 08/30/17 2 Days Ago Rx





   ~09/27/17 


 


AtorvaSTATin [Lipitor] 80 mg PO QHS  tablet 08/30/17 2 Days Ago Rx





   ~09/27/17 


 


Cholecalciferol Vit D3 [Vitamin D3] 1,000 unit PO QDAY  tablet 08/30/17 2 Days 

Ago Rx





   ~09/27/17 


 


Clopidogrel [Plavix] 75 mg PO QDAY  tablet 08/30/17 2 Days Ago Rx





   ~09/27/17 


 


Ezetimibe [Zetia] 10 mg PO QDAY  tablet 08/30/17 2 Days Ago Rx





   ~09/27/17 


 


Ferrous Sulfate [Feosol 325 MG tab] 325 mg PO QDAY  tablet 08/30/17 2 Days Ago 

Rx





   ~09/27/17 


 


Gabapentin [Neurontin] 600 mg PO TID  capsule 08/30/17 2 Days Ago Rx





   ~09/27/17 


 


Spironolactone [Aldactone] 50 mg PO QDAY  tablet 08/30/17 2 Days Ago Rx





   ~09/27/17 


 


traZODone [Desyrel] 100 mg PO QHS  tablet 08/30/17 3 Days Ago Rx





   ~09/26/17 


 


Cephalexin [Keflex] 500 mg PO Q8HR #15 cap 10/03/17 Unknown Rx


 


Metoprolol Xl [Metoprolol 25 mg PO QDAY #30 tablet 10/03/17 Unknown Rx





SUCCINATE ER TAB]    


 


oxyCODONE /ACETAMINOPHEN [Percocet 1 tab PO Q6HR PRN #14 tablet 10/03/17 

Unknown Rx





5/325]    


 


traMADol [Ultram 50 MG tab] 50 mg PO Q6HR PRN #7 tablet 10/03/17 Unknown Rx


 


traZODone [Desyrel] 100 mg PO QHS #1 tablet 10/03/17 Unknown Rx











 Allergies











Allergy/AdvReac Type Severity Reaction Status Date / Time


 


adhesive tape Allergy  Rash Verified 10/12/17 11:13


 


bupivacaine Allergy  Angioedema Verified 10/12/17 11:13


 


Sulfa (Sulfonamide Allergy  Nausea Verified 10/12/17 11:13





Antibiotics)     


 


famotidine [From Pepcid] AdvReac  Vomiting Verified 10/12/17 11:13














Heart Score





- HEART Score


History: Moderately suspicious


EKG: Non-specific


Age: < 45


Risk factors: > 3 risk factors or hx of atherosclerotic disease


Troponin: < normal limit


HEART Score: 4





- Critical Actions


Critical Actions: 4-6 pts:12-16.6% risk of adverse cardiac event. Should be 

admitted





ED Review of Systems


ROS: 


Stated complaint: SEIZURE


Other details as noted in HPI





Comment: All other systems reviewed and negative


Constitutional: denies: chills, fever


Eyes: denies: eye pain, eye discharge, vision change


ENT: denies: ear pain, throat pain


Respiratory: shortness of breath.  denies: cough


Cardiovascular: chest pain, edema


Gastrointestinal: denies: abdominal pain, nausea, diarrhea


Genitourinary: denies: urgency, dysuria, discharge


Musculoskeletal: back pain.  denies: joint swelling, arthralgia


Skin: denies: rash, lesions


Neurological: denies: headache, weakness, paresthesias





ED Past Medical Hx





- Past Medical History


Hx Hypertension: Yes (CHF EF 15-20%)


Hx Heart Attack/AMI: Yes


Hx Congestive Heart Failure: Yes


Hx Diabetes: Yes


Hx GERD: Yes


Hx Liver Disease: No


Hx Renal Disease: Yes (CKD, stage 2)


Hx Seizures: No


Hx Asthma: No


Hx COPD: No


Hx Tuberculosis: No


Hx HIV: No


Additional medical history: CAD, gatroparesis, hypOthyroidism, retinopathy, 

neuropathy.  15 stents





- Surgical History


Hx Coronary Stent: Yes (15)


Hx Pacemaker: Yes


Hx Internal Defibrillator: Yes


Additional Surgical History: tubal ligation, left fallopian tube removed, right 

knee surgery, right arm surgery, left eye surgeryPOWER PORT,Pacemaker  

DEFIBRILATOR Right ring finger surgery





- Social History


Smoking Status: Never Smoker


Substance Use Type: None





- Medications


Home Medications: 


 Home Medications











 Medication  Instructions  Recorded  Confirmed  Last Taken  Type


 


Isosorbide Mononitrate [Isosorbide 120 mg PO DAILY 02/05/15 09/29/17 2 Days Ago 

History





Mononitrate ER (IMDUR)]    ~09/27/17 


 


Potassium Chloride [K-Dur] 20 meq PO BID #60 tab 03/20/16 09/29/17 2 Days Ago Rx





    ~09/27/17 


 


amLODIPine [Norvasc] 5 mg PO QAM 04/26/17 09/29/17 2 Days Ago History





    ~09/27/17 


 


ALPRAZolam [Xanax TAB] 0.5 mg PO Q8H PRN #15 tablet 04/27/17 09/29/17 2 Days 

Ago Rx





    ~09/27/17 


 


Insulin NPH, Human [NovoLIN N] 35 unit SUB-Q QAM #30 units 04/27/17 09/29/17 2 

Days Ago Rx





    ~09/27/17 


 


Sertraline [Zoloft] 50 mg PO QDAY #30 tablet 04/27/17 09/29/17 2 Days Ago Rx





    ~09/27/17 


 


Torsemide [Demadex] 100 mg PO QDAY #30 tablet 04/27/17 09/29/17 2 Days Ago Rx





    ~09/27/17 


 


Insulin Glulisine [Apidra] 0 units SC QDAY 06/22/17 09/29/17 2 Days Ago History





    ~09/27/17 


 


Levothyroxine [Synthroid] 100 mcg PO QDAY 06/22/17 09/29/17 2 Days Ago History





    ~09/27/17 


 


Aspirin [Aspirin BABY CHEW TAB] 81 mg PO QDAY  tab.chew 08/30/17 09/29/17 2 

Days Ago Rx





    ~09/27/17 


 


AtorvaSTATin [Lipitor] 80 mg PO QHS  tablet 08/30/17 09/29/17 2 Days Ago Rx





    ~09/27/17 


 


Cholecalciferol Vit D3 [Vitamin D3] 1,000 unit PO QDAY  tablet 08/30/17 09/29/ 17 2 Days Ago Rx





    ~09/27/17 


 


Clopidogrel [Plavix] 75 mg PO QDAY  tablet 08/30/17 09/29/17 2 Days Ago Rx





    ~09/27/17 


 


Ezetimibe [Zetia] 10 mg PO QDAY  tablet 08/30/17 09/29/17 2 Days Ago Rx





    ~09/27/17 


 


Ferrous Sulfate [Feosol 325 MG tab] 325 mg PO QDAY  tablet 08/30/17 09/29/17 2 

Days Ago Rx





    ~09/27/17 


 


Gabapentin [Neurontin] 600 mg PO TID  capsule 08/30/17 09/29/17 2 Days Ago Rx





    ~09/27/17 


 


Spironolactone [Aldactone] 50 mg PO QDAY  tablet 08/30/17 09/29/17 2 Days Ago Rx





    ~09/27/17 


 


traZODone [Desyrel] 100 mg PO QHS  tablet 08/30/17 09/29/17 3 Days Ago Rx





    ~09/26/17 


 


Cephalexin [Keflex] 500 mg PO Q8HR #15 cap 10/03/17  Unknown Rx


 


Metoprolol Xl [Metoprolol 25 mg PO QDAY #30 tablet 10/03/17  Unknown Rx





SUCCINATE ER TAB]     


 


oxyCODONE /ACETAMINOPHEN [Percocet 1 tab PO Q6HR PRN #14 tablet 10/03/17  

Unknown Rx





5/325]     


 


traMADol [Ultram 50 MG tab] 50 mg PO Q6HR PRN #7 tablet 10/03/17  Unknown Rx


 


traZODone [Desyrel] 100 mg PO QHS #1 tablet 10/03/17  Unknown Rx














ED Physical Exam





- General


Limitations: No Limitations





- Other


Other exam information: 


GENERAL: The patient is well-developed well-nourished.


HENT: Normocephalic.  Atraumatic.    Patient has moist mucous membranes.


EYES: Extraocular motions are intact.  Pupils equal reactive to light 

bilaterally.


NECK: Supple.  Trachea is midline.


CHEST/LUNGS: Clear to auscultation.  There is no respiratory distress noted.


HEART/CARDIOVASCULAR: Regular.  There is no tachycardia.  There is no murmur.


ABDOMEN: Abdomen is soft, nontender.  Patient has normal bowel sounds.  Obese 

habitus.


SKIN: Skin is warm and dry.


NEURO: The patient is awake, alert, and oriented.  The patient is cooperative.  

The patient has no focal neurologic deficits.  The patient has normal speech.


MUSCULOSKELETAL: There is no tenderness or deformity.  There is no limitation 

range of motion.  There is no evidence of acute injury.








ED Course


 Vital Signs











  12/24/17 12/24/17 12/24/17





  18:01 18:17 18:20


 


Temperature 97.7 F  


 


Pulse Rate 82 77 76


 


Respiratory 16 11 L 17





Rate   


 


Blood Pressure 141/79  131/70


 


O2 Sat by Pulse 97 99 98





Oximetry   














  12/24/17 12/24/17 12/24/17





  18:30 18:40 18:50


 


Temperature   


 


Pulse Rate 73 78 70


 


Respiratory 13 17 18





Rate   


 


Blood Pressure 131/70 131/70 131/70


 


O2 Sat by Pulse 99 97 97





Oximetry   














  12/24/17 12/24/17 12/24/17





  19:00 19:10 19:20


 


Temperature   


 


Pulse Rate 69 68 68


 


Respiratory 13 14 11 L





Rate   


 


Blood Pressure 127/80 127/80 127/80


 


O2 Sat by Pulse 98 98 98





Oximetry   














  12/24/17 12/24/17 12/24/17





  19:30 19:40 19:50


 


Temperature   


 


Pulse Rate 64 69 69


 


Respiratory 15 11 L 12





Rate   


 


Blood Pressure 127/80 127/80 127/80


 


O2 Sat by Pulse 96 99 99





Oximetry   














  12/24/17 12/24/17 12/24/17





  20:00 20:10 20:20


 


Temperature   


 


Pulse Rate 66 69 67


 


Respiratory 7 L 12 16





Rate   


 


Blood Pressure 127/80 126/85 126/85


 


O2 Sat by Pulse 98 99 99





Oximetry   














  12/24/17 12/24/17 12/24/17





  20:30 20:40 20:49


 


Temperature   97.7 F


 


Pulse Rate 69 68 


 


Respiratory 17 14 





Rate   


 


Blood Pressure 126/85 126/85 


 


O2 Sat by Pulse 98 97 





Oximetry   














ANURAG score





- Anurag Score


Age > 65: (0) No


Aspirin use within the Past 7 Days: (1) Yes


3 or more CAD Risk Factors: (1) Yes


2 or more Angina events in past 24 hrs: (1) Yes


Known CAD with more than 50% Stenosis: (0) No


Elevated Cardiac Markers: (0) No


ST Deviation Greater than 0.5mm: (0) No


ANURAG Score: 3





ED Medical Decision Making





- Lab Data


Result diagrams: 


 12/24/17 18:30





 12/24/17 18:30





- EKG Data


-: EKG Interpreted by Me


EKG shows normal: sinus rhythm (with PVCs), axis, intervals (slightly prolonged 

QT and QTC intervals), QRS complexes (Q waves to the septal/anterior leads), ST-

T waves


Rate: normal





- EKG Data


When compared to previous EKG there are: no significant change


Interpretation: unchanged when compared t (10/2017)





- Radiology Data


Radiology results: image reviewed


interpreted by me: 


Chest x-ray does not show any acute process.  There are no pleural effusions, 

obvious pneumonia and there is no pneumothorax.








- Medical Decision Making


Patient has hyperglycemia but does not appear to be in diabetic ketoacidosis.  

She has continued chest pain with a significant cardiac history.  No signs of 

ST elevation MI on EKG.  First troponin negative.  Negative d-dimer.  She will 

be admitted to the hospital for further evaluation and treatment has been 

accepted for admission by the hospitalist, Dr. Boyce.








- Differential Diagnosis


MI, PE, consider enteritis, GERD


Critical Care Time: No


Critical care attestation.: 


If time is entered above; I have spent that time in minutes in the direct care 

of this critically ill patient, excluding procedure time.








ED Disposition


Clinical Impression: 


 Hyperglycemia, Chest pain, rule out acute myocardial infarction, Stented 

coronary artery, History of coronary artery disease





Uncontrolled diabetes mellitus


Qualifiers:


 Diabetes mellitus type: type 1 Diabetes mellitus complication status: with 

hyperglycemia Qualified Code(s): E10.65 - Type 1 diabetes mellitus with 

hyperglycemia





Disposition: DC-09 OP ADMIT IP TO THIS HOSP


Is pt being admited?: Yes


Condition: Stable


Instructions:  Diabetes Mellitus Type 2 in Adults (ED), Chest Pain (ED)


Referrals: 


PRIMARY CARE,MD [Primary Care Provider] - 3-5 Days


Time of Disposition: 22:17

## 2017-12-25 VITALS — DIASTOLIC BLOOD PRESSURE: 77 MMHG | SYSTOLIC BLOOD PRESSURE: 117 MMHG

## 2017-12-25 LAB
ALBUMIN SERPL-MCNC: 3.5 G/DL (ref 3.9–5)
ALBUMIN/GLOB SERPL: 1.3 %
ALP SERPL-CCNC: 60 UNITS/L (ref 35–129)
ALT SERPL-CCNC: 15 UNITS/L (ref 7–56)
ANION GAP SERPL CALC-SCNC: 16 MMOL/L
BASOPHILS NFR BLD AUTO: 0.5 % (ref 0–1.8)
BILIRUB SERPL-MCNC: 0.3 MG/DL (ref 0.1–1.2)
BUN SERPL-MCNC: 22 MG/DL (ref 7–17)
BUN/CREAT SERPL: 20 %
CALCIUM SERPL-MCNC: 8.4 MG/DL (ref 8.4–10.2)
CHLORIDE SERPL-SCNC: 102.1 MMOL/L (ref 98–107)
CK SERPL-CCNC: 58 UNITS/L (ref 30–135)
CO2 SERPL-SCNC: 29 MMOL/L (ref 22–30)
EOSINOPHIL NFR BLD AUTO: 3.3 % (ref 0–4.3)
GLUCOSE SERPL-MCNC: 118 MG/DL (ref 65–100)
HCT VFR BLD CALC: 38.5 % (ref 30.3–42.9)
HGB BLD-MCNC: 13 GM/DL (ref 10.1–14.3)
MCH RBC QN AUTO: 33 PG (ref 28–32)
MCHC RBC AUTO-ENTMCNC: 34 % (ref 30–34)
MCV RBC AUTO: 96 FL (ref 79–97)
PLATELET # BLD: 227 K/MM3 (ref 140–440)
POTASSIUM SERPL-SCNC: 3.4 MMOL/L (ref 3.6–5)
PROT SERPL-MCNC: 6.3 G/DL (ref 6.3–8.2)
RBC # BLD AUTO: 4.01 M/MM3 (ref 3.65–5.03)
SODIUM SERPL-SCNC: 144 MMOL/L (ref 137–145)
WBC # BLD AUTO: 6.7 K/MM3 (ref 4.5–11)

## 2017-12-25 RX ADMIN — MORPHINE SULFATE PRN MG: 4 INJECTION, SOLUTION INTRAMUSCULAR; INTRAVENOUS at 05:58

## 2017-12-25 RX ADMIN — MORPHINE SULFATE PRN MG: 4 INJECTION, SOLUTION INTRAMUSCULAR; INTRAVENOUS at 00:54

## 2017-12-25 RX ADMIN — POTASSIUM CHLORIDE SCH MEQ: 1500 TABLET, EXTENDED RELEASE ORAL at 09:35

## 2017-12-25 NOTE — DISCHARGE SUMMARY
Providers





- Providers


Date of Admission: 


12/24/17 22:38





Attending physician: 


BERT VALLEJO MD





Primary care physician: 


PRIMARY CARE MD








Hospitalization


Reason for admission: CHEST PAIN


Condition: Stable


Hospital course: 


41 y/o  female presents with chest pain of 2 days' duration.Chest pain 

is intermittent in nature.pain is about 8 on a scale of 1-10.  No diaphoresis 

no shortness of breath no palpitations. history significant premature coronary 

artery disease, AMI with CAD S/P PROX LAD STENT, (LHC 10/14:  LEFT MAIN:  LI, 

LAD:  patent stent, D1:  100% stenosis, LCX:  LI, OM2:  90% stenosis, RCA:  

distal 40%, PDA:  50%), (PET SCAN 8/14:  ischemia with some evidence of 

infarction in the basal anterior lateral wall, infarction in apical and mid 

ventricualr inferior lateral wall, EF rest:  28%, stress:  30%), Chronic stable 

angina, Chronic systolic heart failure EF 30-35%, (ECHO:  2/15:  EF 30-35%, 

trace MR, mild LAE), [facility] [facility] ICD, hypothyroidism CKD 3, insulin-dependent 

diabetes mellitus with gastroparesis, hypertension, major depressive disorder 

and dyslipidemia, recent PCI to RCA on 6/13/2017 @ [place] followed regularly by 

[place] [facility], Dr. Anderson. She presented with c/o chest pain. She reports 

compliance with her medications.  Chest x-ray read as no acute findings. ON Re-

evaluation, patient reported chest pain was reproducible, she stated that she 

had a fatal like arrthymia and as a result her doctor asked her to come to the 

hospital, this was felt to be due to her elevated Blood glucose, she had missed 

a few diabetes medicine. She Was treated for Hyperosmolar non-ketotic state in 

patient with type 2 diabetes mellitus with improvment and counselling was 

provided, no further arrythmia was noted. she is to follow with cardiology 

outpatient in 2-3 days and verbalized understanding. 





(1) Acute chest pain- costochondritis





(2) Hyperosmolar non-ketotic state in patient with type 2 diabetes mellitus





(3) Coronary artery disease





(4) Hyperlipidemia





(5) Hypothyroidism (acquired)





(6) CHRONIC SYSTOLIC CHF (congestive heart failure)








Disposition: DC-01 TO HOME OR SELFCARE


Time spent for discharge: 35mins





Core Measure Documentation





- Palliative Care


Palliative Care/ Comfort Measures: Not Applicable





- Core Measures


Any of the following diagnoses?: heart failure





- VTE Discharge Requirements


Deep Vein Thrombosis/Pulmonary Embolism Present on Admission: No





- Heart Failure Discharge Requirements


ACE/ARB for LVSD if EF <40%: Yes


Beta blocker at discharge: Yes





Exam





- Physical Exam


Narrative exam: 


 VITAL SIGNS:  Reviewed.    


GENERAL:  The patient appeared well nourished and normally developed.  Morbid 

obesity.  Vital signs as documented.


HEAD:  No signs of head trauma.


EYES:  Pupils are equal.  Extraocular motions intact.  


EARS:  Hearing grossly intact.


MOUTH:  Oropharynx is normal. 


NECK:  No adenopathy, no JVD.   


CHEST:  Chest with clear breath sounds bilaterally.  No wheezes, rales, or 

rhonchi.  


CARDIAC:  Regular rate and rhythm.  S1 and S2, without murmurs, gallops, or 

rubs.


VASCULAR:  No Edema.  Peripheral pulses normal and equal in all extremities.


ABDOMEN:  Soft, without detectable tenderness.  No sign of distention.  No   

rebound or guarding, and no masses palpated.   Bowel Sounds normal.


MUSCULOSKELETAL:  Good range of motion of all major joints. Extremities without 

clubbing, cyanosis or edema.  


NEUROLOGIC EXAM:  Alert and oriented x 3.  No focal sensory or strength 

deficits.   Speech normal.  Follows commands.


PSYCHIATRIC:  Mood normal.


SKIN:  No rash or lesions.














- Constitutional


Vitals: 


 











Temp Pulse Resp BP Pulse Ox


 


 97.5 F L  61   20   98/59   98 


 


 12/25/17 03:48  12/25/17 04:21  12/25/17 05:58  12/25/17 03:48  12/25/17 03:48














Plan


Activity: advance as tolerated, fall precautions


Diet: low cholesterol, diabetic


Special Instructions: record daily BP diary


Additional Instructions: FOLLOWWITHPRIMARYCARDIOLOGY


Follow up with: 


PRIMARY CARE,MD [Primary Care Provider] - 3-5 Days

## 2017-12-25 NOTE — PROGRESS NOTE
Assessment and Plan





- Patient Problems


(1) Acute chest pain


Current Visit: No   Status: Acute   


Plan to address problem: 


consistent with stable angina.  Patient had multiple Lexiscan's .  Patient to 

be managed medically.  We will get serial cardiac enzymes.  No Lexiscan 

ordered.  Cardiology consult requested.  Symptomatic treatment.








(2) Hyperosmolar non-ketotic state in patient with type 2 diabetes mellitus


Current Visit: Yes   Status: Acute   


Plan to address problem: 


High-dose sliding scale at this time.Accu-Cheks before meals and at bedt.  We'

ll increase the frequency of  blood sugars if not coming down.  Patient's 

insulin to be adjusted upward.








(3) Coronary artery disease


Current Visit: Yes   Status: Chronic   


Qualifiers: 


   Coronary Disease-Associated Artery/Lesion type: native artery   Native vs. 

transplanted heart: native heart   Associated angina: with stable angina   

Qualified Code(s): I25.118 - Atherosclerotic heart disease of native coronary 

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   


Plan to address problem: 


patient to be continued on Plavix and statins/zetia








(4) Hyperlipidemia


Current Visit: Yes   Status: Chronic   


Qualifiers: 


   Hyperlipidemia type: mixed hyperlipidemia   Qualified Code(s): E78.2 - Mixed 

hyperlipidemia   


Plan to address problem: 


continue statins and Zetia








(5) Hypothyroidism (acquired)


Current Visit: Yes   Status: Chronic   


Plan to address problem: 


TSH high.  Levothyroxine dosage increase from 100 mcg to 125 mcg








(6) CHF (congestive heart failure)


Current Visit: Yes   Status: Chronic   


Qualifiers: 


   Congestive heart failure type: combined 


Plan to address problem: 


continue torsemide








(7) DVT prophylaxis


Current Visit: Yes   Status: Acute   


Plan to address problem: 


patient on Lovenox.








Subjective


Date of service: 12/25/17


Principal diagnosis: Chest pain at rest


Interval history: 


Patient is symptom free at this point








Objective





- Exam


Narrative Exam: 


lying in bed comfortably








- Constitutional


Vitals: 


 Vital Signs - 12hr











  12/24/17 12/24/17 12/24/17





  20:20 20:30 20:40


 


Temperature   


 


Pulse Rate 67 69 68


 


Respiratory 16 17 14





Rate   


 


Blood Pressure 126/85 126/85 126/85


 


O2 Sat by Pulse 99 98 97





Oximetry   














  12/24/17 12/24/17 12/24/17





  20:46 20:49 20:50


 


Temperature  97.7 F 


 


Pulse Rate 69  68


 


Respiratory 18  9 L





Rate   


 


Blood Pressure 126/85  126/85


 


O2 Sat by Pulse 98  99





Oximetry   














  12/24/17 12/24/17 12/24/17





  20:56 21:00 21:06


 


Temperature   


 


Pulse Rate 68 68 69


 


Respiratory 19 13 19





Rate   


 


Blood Pressure 126/85 126/85 136/78


 


O2 Sat by Pulse 98 97 96





Oximetry   














  12/24/17 12/24/17 12/24/17





  21:10 21:16 21:20


 


Temperature   


 


Pulse Rate 77 75 66


 


Respiratory 19 13 18





Rate   


 


Blood Pressure 136/78 136/78 136/78


 


O2 Sat by Pulse 97 99 99





Oximetry   














  12/24/17 12/24/17 12/24/17





  21:26 21:30 21:36


 


Temperature   


 


Pulse Rate 68 67 66


 


Respiratory 14 14 14





Rate   


 


Blood Pressure 136/78 136/78 136/78


 


O2 Sat by Pulse 100 98 99





Oximetry   














  12/24/17 12/24/17 12/24/17





  21:40 21:46 21:50


 


Temperature   


 


Pulse Rate 66 68 65


 


Respiratory 12 18 12





Rate   


 


Blood Pressure 136/78 136/78 136/78


 


O2 Sat by Pulse 98 97 96





Oximetry   














  12/24/17 12/24/17 12/24/17





  21:56 22:00 22:06


 


Temperature   


 


Pulse Rate 63 64 67


 


Respiratory 11 L 9 L 19





Rate   


 


Blood Pressure 136/78 136/78 136/78


 


O2 Sat by Pulse 98 97 95





Oximetry   














  12/24/17 12/24/17 12/24/17





  22:10 22:16 22:20


 


Temperature   


 


Pulse Rate 68 69 74


 


Respiratory 17 14 18





Rate   


 


Blood Pressure 136/78 136/78 136/78


 


O2 Sat by Pulse 96 97 97





Oximetry   














  12/24/17 12/24/17 12/24/17





  22:26 22:30 22:36


 


Temperature   


 


Pulse Rate 70 67 62


 


Respiratory 11 L 13 14





Rate   


 


Blood Pressure 136/78 136/78 136/78


 


O2 Sat by Pulse 97 98 96





Oximetry   














  12/24/17 12/24/17 12/24/17





  22:40 22:46 22:50


 


Temperature   


 


Pulse Rate 62 64 64


 


Respiratory 15 13 14





Rate   


 


Blood Pressure 100/60 100/60 100/60


 


O2 Sat by Pulse 96 96 96





Oximetry   














  12/24/17 12/24/17 12/24/17





  22:56 23:00 23:06


 


Temperature   


 


Pulse Rate 65 65 64


 


Respiratory 15 14 14





Rate   


 


Blood Pressure 100/60 101/62 101/62


 


O2 Sat by Pulse 96 97 96





Oximetry   














  12/24/17 12/24/17 12/24/17





  23:10 23:16 23:20


 


Temperature   


 


Pulse Rate 64 65 66


 


Respiratory 17 11 L 14





Rate   


 


Blood Pressure 101/62 101/62 101/62


 


O2 Sat by Pulse 96 95 96





Oximetry   














  12/24/17 12/24/17 12/24/17





  23:26 23:30 23:36


 


Temperature   


 


Pulse Rate 66 65 74


 


Respiratory 13 11 L 14





Rate   


 


Blood Pressure 101/62 101/62 101/62


 


O2 Sat by Pulse 96 96 97





Oximetry   














  12/24/17 12/24/17 12/24/17





  23:40 23:46 23:48


 


Temperature   


 


Pulse Rate 66 67 65


 


Respiratory 10 L 16 10 L





Rate   


 


Blood Pressure 101/62 101/62 101/62


 


O2 Sat by Pulse 100 99 99





Oximetry   














  12/24/17 12/24/17 12/24/17





  23:50 23:52 23:59


 


Temperature   97.9 F


 


Pulse Rate 68 68 


 


Respiratory 9 L 16 





Rate   


 


Blood Pressure 101/62 101/62 


 


O2 Sat by Pulse 98 99 





Oximetry   














  12/25/17 12/25/17 12/25/17





  00:00 00:10 00:52


 


Temperature   98.2 F


 


Pulse Rate 66 64 71


 


Respiratory 10 L 15 21





Rate   


 


Blood Pressure 101/62 136/85 93/53


 


O2 Sat by Pulse 98 98 98





Oximetry   














  12/25/17 12/25/17 12/25/17





  03:48 04:21 05:58


 


Temperature 97.5 F L  


 


Pulse Rate 61 61 


 


Respiratory 19  20





Rate   


 


Blood Pressure 98/59  


 


O2 Sat by Pulse 98  





Oximetry   











General appearance: Present: no acute distress, well-nourished





- EENT


Eyes: PERRL, EOM intact


ENT: hearing intact, clear oral mucosa


Ears: bilateral: normal





- Neck


Neck: supple, normal ROM





- Respiratory


Respiratory effort: normal


Respiratory: bilateral: CTA





- Breasts


Breasts: deferred, normal





- Cardiovascular


Rhythm: regular


Heart Sounds: Present: S1 & S2.  Absent: gallop, rub


Extremities: pulses intact, No edema, normal color, Full ROM





- Gastrointestinal


General gastrointestinal: Present: soft, non-tender, non-distended, normal 

bowel sounds





- Genitourinary


Female genitourinary: normal





- Integumentary


Integumentary: clear, warm, dry





- Musculoskeletal


Musculoskeletal: 1, strength equal bilaterally





- Neurologic


Neurologic: moves all extremities





- Psychiatric


Psychiatric: memory intact, appropriate mood/affect, intact judgment & insight





- Allied health notes


Allied health notes reviewed: nursing, case management





- Labs


CBC & Chem 7: 


 12/25/17 07:20





 12/25/17 07:20


Labs: 


 Abnormal lab results











  12/24/17 12/24/17 12/24/17 Range/Units





  18:30 18:30 18:30 


 


MCH     (28-32)  pg


 


RDW  15.6 H    (13.2-15.2)  %


 


Lymph % (Auto)     (13.4-35.0)  %


 


Mono % (Auto)     (0.0-7.3)  %


 


PT    11.6 L  (12.2-14.9)  Sec.


 


INR    0.81 L  (0.87-1.13)  


 


APTT    37.5 H  (24.2-36.6)  Sec.


 


Sodium   136 L   (137-145)  mmol/L


 


Potassium     (3.6-5.0)  mmol/L


 


Chloride   95.6 L   ()  mmol/L


 


BUN   24 H   (7-17)  mg/dL


 


Glucose   536 H*   ()  mg/dL


 


POC Glucose     ()  


 


Calcium   8.1 L   (8.4-10.2)  mg/dL


 


NT-Pro-B Natriuret Pep     (0-450)  pg/mL


 


Albumin     (3.9-5)  g/dL


 


TSH     (0.270-4.200)  mlU/mL














  12/24/17 12/24/17 12/24/17 Range/Units





  18:30 18:30 20:44 


 


MCH     (28-32)  pg


 


RDW     (13.2-15.2)  %


 


Lymph % (Auto)     (13.4-35.0)  %


 


Mono % (Auto)     (0.0-7.3)  %


 


PT     (12.2-14.9)  Sec.


 


INR     (0.87-1.13)  


 


APTT     (24.2-36.6)  Sec.


 


Sodium     (137-145)  mmol/L


 


Potassium     (3.6-5.0)  mmol/L


 


Chloride     ()  mmol/L


 


BUN     (7-17)  mg/dL


 


Glucose     ()  mg/dL


 


POC Glucose    392 H  ()  


 


Calcium     (8.4-10.2)  mg/dL


 


NT-Pro-B Natriuret Pep  1616 H    (0-450)  pg/mL


 


Albumin     (3.9-5)  g/dL


 


TSH   11.340 H   (0.270-4.200)  mlU/mL














  12/24/17 12/25/17 12/25/17 Range/Units





  23:43 07:20 07:20 


 


MCH   33 H   (28-32)  pg


 


RDW     (13.2-15.2)  %


 


Lymph % (Auto)   46.4 H   (13.4-35.0)  %


 


Mono % (Auto)   7.9 H   (0.0-7.3)  %


 


PT     (12.2-14.9)  Sec.


 


INR     (0.87-1.13)  


 


APTT     (24.2-36.6)  Sec.


 


Sodium     (137-145)  mmol/L


 


Potassium    3.4 L  (3.6-5.0)  mmol/L


 


Chloride     ()  mmol/L


 


BUN    22 H  (7-17)  mg/dL


 


Glucose    118 H  ()  mg/dL


 


POC Glucose  153 H    ()  


 


Calcium     (8.4-10.2)  mg/dL


 


NT-Pro-B Natriuret Pep     (0-450)  pg/mL


 


Albumin    3.5 L  (3.9-5)  g/dL


 


TSH     (0.270-4.200)  mlU/mL














  12/25/17 Range/Units





  07:49 


 


MCH   (28-32)  pg


 


RDW   (13.2-15.2)  %


 


Lymph % (Auto)   (13.4-35.0)  %


 


Mono % (Auto)   (0.0-7.3)  %


 


PT   (12.2-14.9)  Sec.


 


INR   (0.87-1.13)  


 


APTT   (24.2-36.6)  Sec.


 


Sodium   (137-145)  mmol/L


 


Potassium   (3.6-5.0)  mmol/L


 


Chloride   ()  mmol/L


 


BUN   (7-17)  mg/dL


 


Glucose   ()  mg/dL


 


POC Glucose  106 H  ()  


 


Calcium   (8.4-10.2)  mg/dL


 


NT-Pro-B Natriuret Pep   (0-450)  pg/mL


 


Albumin   (3.9-5)  g/dL


 


TSH   (0.270-4.200)  mlU/mL

## 2018-05-14 ENCOUNTER — HOSPITAL ENCOUNTER (EMERGENCY)
Dept: HOSPITAL 5 - ED | Age: 43
Discharge: LEFT BEFORE DISCHARGE | End: 2018-05-14
Payer: MEDICARE

## 2018-05-14 VITALS — SYSTOLIC BLOOD PRESSURE: 119 MMHG | DIASTOLIC BLOOD PRESSURE: 78 MMHG

## 2018-05-14 DIAGNOSIS — R07.9: Primary | ICD-10-CM

## 2018-05-14 DIAGNOSIS — Z53.21: ICD-10-CM

## 2018-05-14 PROCEDURE — 93005 ELECTROCARDIOGRAM TRACING: CPT

## 2018-05-14 PROCEDURE — 93010 ELECTROCARDIOGRAM REPORT: CPT

## 2020-04-22 ENCOUNTER — HOSPITAL ENCOUNTER (INPATIENT)
Dept: HOSPITAL 5 - ED | Age: 45
LOS: 3 days | Discharge: HOME | DRG: 205 | End: 2020-04-25
Attending: INTERNAL MEDICINE | Admitting: INTERNAL MEDICINE
Payer: MEDICARE

## 2020-04-22 DIAGNOSIS — Z79.4: ICD-10-CM

## 2020-04-22 DIAGNOSIS — E87.1: ICD-10-CM

## 2020-04-22 DIAGNOSIS — I13.0: ICD-10-CM

## 2020-04-22 DIAGNOSIS — Z88.4: ICD-10-CM

## 2020-04-22 DIAGNOSIS — E11.22: ICD-10-CM

## 2020-04-22 DIAGNOSIS — Z95.810: ICD-10-CM

## 2020-04-22 DIAGNOSIS — M94.0: Primary | ICD-10-CM

## 2020-04-22 DIAGNOSIS — Z88.6: ICD-10-CM

## 2020-04-22 DIAGNOSIS — K21.9: ICD-10-CM

## 2020-04-22 DIAGNOSIS — I25.2: ICD-10-CM

## 2020-04-22 DIAGNOSIS — K31.84: ICD-10-CM

## 2020-04-22 DIAGNOSIS — Z88.8: ICD-10-CM

## 2020-04-22 DIAGNOSIS — N18.2: ICD-10-CM

## 2020-04-22 DIAGNOSIS — Z95.1: ICD-10-CM

## 2020-04-22 DIAGNOSIS — I50.23: ICD-10-CM

## 2020-04-22 DIAGNOSIS — Z82.49: ICD-10-CM

## 2020-04-22 DIAGNOSIS — I25.5: ICD-10-CM

## 2020-04-22 DIAGNOSIS — E11.43: ICD-10-CM

## 2020-04-22 DIAGNOSIS — Z23: ICD-10-CM

## 2020-04-22 DIAGNOSIS — E87.6: ICD-10-CM

## 2020-04-22 DIAGNOSIS — I25.10: ICD-10-CM

## 2020-04-22 DIAGNOSIS — N17.0: ICD-10-CM

## 2020-04-22 DIAGNOSIS — Z88.2: ICD-10-CM

## 2020-04-22 DIAGNOSIS — Z91.048: ICD-10-CM

## 2020-04-22 DIAGNOSIS — Z98.51: ICD-10-CM

## 2020-04-22 DIAGNOSIS — E03.9: ICD-10-CM

## 2020-04-22 LAB
APTT BLD: 28.1 SEC. (ref 24.2–36.6)
BASOPHILS # (AUTO): 0.1 K/MM3 (ref 0–0.1)
BASOPHILS NFR BLD AUTO: 1 % (ref 0–1.8)
BUN SERPL-MCNC: 31 MG/DL (ref 7–17)
BUN/CREAT SERPL: 21 %
CALCIUM SERPL-MCNC: 8.5 MG/DL (ref 8.4–10.2)
EOSINOPHIL # BLD AUTO: 0.2 K/MM3 (ref 0–0.4)
EOSINOPHIL NFR BLD AUTO: 2.8 % (ref 0–4.3)
HCT VFR BLD CALC: 36.6 % (ref 30.3–42.9)
HEMOLYSIS INDEX: 4
HGB BLD-MCNC: 12.3 GM/DL (ref 10.1–14.3)
INR PPP: 1.03 (ref 0.87–1.13)
LYMPHOCYTES # BLD AUTO: 1.3 K/MM3 (ref 1.2–5.4)
LYMPHOCYTES NFR BLD AUTO: 17.9 % (ref 13.4–35)
MCHC RBC AUTO-ENTMCNC: 34 % (ref 30–34)
MCV RBC AUTO: 98 FL (ref 79–97)
MONOCYTES # (AUTO): 0.5 K/MM3 (ref 0–0.8)
MONOCYTES % (AUTO): 6.4 % (ref 0–7.3)
PLATELET # BLD: 256 K/MM3 (ref 140–440)
RBC # BLD AUTO: 3.72 M/MM3 (ref 3.65–5.03)

## 2020-04-22 PROCEDURE — 85610 PROTHROMBIN TIME: CPT

## 2020-04-22 PROCEDURE — 84484 ASSAY OF TROPONIN QUANT: CPT

## 2020-04-22 PROCEDURE — 80048 BASIC METABOLIC PNL TOTAL CA: CPT

## 2020-04-22 PROCEDURE — 85025 COMPLETE CBC W/AUTO DIFF WBC: CPT

## 2020-04-22 PROCEDURE — 93005 ELECTROCARDIOGRAM TRACING: CPT

## 2020-04-22 PROCEDURE — 83880 ASSAY OF NATRIURETIC PEPTIDE: CPT

## 2020-04-22 PROCEDURE — 71045 X-RAY EXAM CHEST 1 VIEW: CPT

## 2020-04-22 PROCEDURE — 82962 GLUCOSE BLOOD TEST: CPT

## 2020-04-22 PROCEDURE — 3E0234Z INTRODUCTION OF SERUM, TOXOID AND VACCINE INTO MUSCLE, PERCUTANEOUS APPROACH: ICD-10-PCS | Performed by: INTERNAL MEDICINE

## 2020-04-22 PROCEDURE — 83735 ASSAY OF MAGNESIUM: CPT

## 2020-04-22 PROCEDURE — 90732 PPSV23 VACC 2 YRS+ SUBQ/IM: CPT

## 2020-04-22 PROCEDURE — 85730 THROMBOPLASTIN TIME PARTIAL: CPT

## 2020-04-22 PROCEDURE — 94760 N-INVAS EAR/PLS OXIMETRY 1: CPT

## 2020-04-22 PROCEDURE — 90686 IIV4 VACC NO PRSV 0.5 ML IM: CPT

## 2020-04-22 PROCEDURE — 36415 COLL VENOUS BLD VENIPUNCTURE: CPT

## 2020-04-22 RX ADMIN — OXYCODONE AND ACETAMINOPHEN PRN TAB: 5; 325 TABLET ORAL at 23:07

## 2020-04-22 NOTE — HISTORY AND PHYSICAL REPORT
History of Present Illness


History of present illness: 


44-year-old woman with a history of hypertension, diabetes complicated by 

gastroparesis, coronary artery disease, CHF, hypothyroidism comes to the 

emergency room with complaints of chest pain and feels like swollen from her 

head to her toes.  Pain is in the left substernal area which she describes a 

sharp,  pain, intermittent every 2-3 minutes, radiating to the left shoulder, 

intensity 4/10 cannot identify exacerbating factors..  Admits to nausea, 

shortness of breath, no diaphoresis PND, orthopnea.  Patient was admitted at 

[facility] for 6 days, she was discharged yesterday, she was treated for CHF

exacerbation and chest pain.  Patient states that her weight is up 14 pounds 

even with aggressive diuresis at [facility].  Patient will be admitted for 

chest pain evaluation


Review Of  Systems:


Constitutional: no weight loss, fever, chills


Ears, eyes, nose, mouth and throat: no nasal congestion, no nasal discharge, no 

sinus pressure, blurry vision, diplopia


Neck: No neck pain or rigidity.


Cardiovascular: No  palpitations


Respiratory: No  cough


Gastrointestinal: No hematochezia


Genitourinary : no dysuria, frequency


Musculoskeletal: no muscle ache , joint pain


Integumentary: no rash, no pruritis


Neurological: no parathesias, focal weakness


Endocrine: no cold or heat intolerance, no polyuria or polydipsia


Hematologic/Lymphatic: no easy bruising, no easy bleeding, no gland swelling


Allergic/Immunologic: no urticaria, no angioedema.





PAST MEDICAL HISTORY:hypertension, diabetes complicated by gastroparesis, 

coronary artery disease, CHF, hypothyroidism





PAST SURGICAL HISTORY: tubal ligation, left salpingectomy, right knee surgery, 

right arm surgery, left eye surgery, AICD, port placement





SOCIAL HISTORY: Denies alcohol, tobacco, drugs





FAMILY HISTORY: Coronary artery disease

















Medications and Allergies


                                    Allergies











Allergy/AdvReac Type Severity Reaction Status Date / Time


 


adhesive tape Allergy  Rash Verified 10/12/17 11:13


 


bupivacaine Allergy  Angioedema Verified 10/12/17 11:13


 


ketorolac [From Toradol] Allergy  Hives Verified 04/22/20 18:24


 


Sulfa (Sulfonamide Allergy  Nausea Verified 10/12/17 11:13





Antibiotics)     


 


famotidine [From Pepcid] AdvReac  Vomiting Verified 10/12/17 11:13











                                Home Medications











 Medication  Instructions  Recorded  Confirmed  Last Taken  Type


 


Isosorbide Mononitrate [Isosorbide 120 mg PO DAILY 02/05/15 04/23/20 09/20/19 

History





Mononitrate ER (IMDUR)]     


 


Potassium Chloride [K-Dur] 20 meq PO BID #60 tab 03/20/16 04/23/20 09/20/19 Rx


 


amLODIPine 5 mg PO QAM 04/26/17 04/23/20 09/20/19 History


 


ALPRAZolam [Xanax TAB] 0.5 mg PO Q8H PRN #15 tablet 04/27/17 04/23/20 09/20/19 

Rx


 


Insulin NPH, Human [NovoLIN N] 35 unit SUB-Q QAM #30 units 04/27/17 04/23/20 09/20/19 Rx


 


Sertraline [Zoloft] 50 mg PO QDAY #30 tablet 04/27/17 04/23/20 09/20/19 Rx


 


Torsemide [Demadex] 100 mg PO QDAY #30 tablet 04/27/17 04/23/20 09/20/19 Rx


 


Insulin Glulisine [Apidra] 0 units SC QDAY 06/22/17 04/23/20 09/20/19 History


 


Levothyroxine [Synthroid] 100 mcg PO QDAY 06/22/17 04/23/20 09/20/19 History


 


Aspirin [Aspirin BABY CHEW TAB] 81 mg PO QDAY  tab.chew 08/30/17 04/23/20 09/20/19 Rx


 


AtorvaSTATin [Lipitor] 80 mg PO QHS  tablet 08/30/17 04/23/20 09/20/19 Rx


 


Cholecalciferol Vit D3 [Vitamin D3 1,000 unit PO QDAY  tablet 08/30/17 04/23/20 

09/15/19 Rx





1,000 UNIT TAB]     


 


Clopidogrel [Plavix] 75 mg PO QDAY  tablet 08/30/17 04/23/20 09/20/19 Rx


 


Ezetimibe [Zetia] 10 mg PO QDAY  tablet 08/30/17 04/23/20 09/20/19 Rx


 


Ferrous Sulfate [Feosol 325 MG tab] 325 mg PO QDAY  tablet 08/30/17 04/23/20 09/20/19 Rx


 


Gabapentin 600 mg PO TID  capsule 08/30/17 04/23/20 09/20/19 Rx


 


Spironolactone [Aldactone] 50 mg PO QDAY  tablet 08/30/17 04/23/20 09/20/19 Rx


 


traZODone [Desyrel] 100 mg PO QHS #1 tablet 10/03/17 04/23/20 09/20/19 Rx


 


diphenhydrAMINE [Benadryl CAP] 50 mg PO Q6H PRN #30 capsule 09/21/19 04/23/20 

Unknown Rx


 


Metoprolol Xl [Metoprolol 25 mg PO QDAY 04/23/20 04/23/20 Unknown History





SUCCINATE ER TAB]     














Exam





- Physical Exam


Narrative exam: 


Gen. appearance: Patient lying in bed, no apparent distress


HEENT: Normocephalic, atraumatic, pupils equally round and reactive to light, 

extraocular movement intact, and no sclericterus,. No JVD or thyromegaly or 

nodule,neck supple, no carotid bruit ,mucous membranes moist, no exudate or 

erythema


Heart: S1, S2, regular rate and rhythm


Lungs: Clear bilaterally, breathing comfortable


Abdomen: Positive bowel sounds, nontender, nondistended, no organomegaly


Extremity: no edema, cyanosis, clubbing


Skin: No rash, nodules, warm, dry


Neuro: Cranial nerves II to XII intact, speech is fluent, moves extremities, 

sensory intact











- Constitutional


Vitals: 


                                        











Temp Pulse Resp BP Pulse Ox


 


 98.7 F   60   16   104/53   98 


 


 04/22/20 19:29  04/22/20 20:00  04/22/20 20:00  04/22/20 20:00  04/22/20 20:00














Results





- Labs


CBC & Chem 7: 


                                 04/22/20 19:23





                                 04/22/20 19:23


Labs: 


                              Abnormal lab results











  04/22/20 04/22/20 04/22/20 Range/Units





  19:23 19:23 19:23 


 


MCV  98 H    (79-97)  fl


 


MCH  33 H    (28-32)  pg


 


RDW  16.0 H    (13.2-15.2)  %


 


Seg Neutrophils %  71.9 H    (40.0-70.0)  %


 


Sodium   136 L   (137-145)  mmol/L


 


Potassium   3.5 L   (3.6-5.0)  mmol/L


 


Chloride   94.0 L   ()  mmol/L


 


BUN   31 H   (7-17)  mg/dL


 


Creatinine   1.5 H   (0.7-1.2)  mg/dL


 


Glucose   305 H   ()  mg/dL


 


NT-Pro-B Natriuret Pep    6139 H  (0-450)  pg/mL














- Imaging and Cardiology


EKG: image reviewed


Chest x-ray: report reviewed





Assessment and Plan


Assessment


Chest pain/Coronary artery disease


Check cardiac enzymes, consult cardiology





Hypokalemia, replete potassium





CHF, [id number]% stable


continue anti-failure medications once blood pressure will support





Hypertension, now borderline hypotension


Monitor, hold antihypertensives





Diabetes


check fingersticks initiated insulin sliding scale





Hypothyroidism





Start DVT prophylaxis

## 2020-04-22 NOTE — XRAY REPORT
CHEST 1 VIEW 



INDICATION / CLINICAL INFORMATION:

Chest pain.



COMPARISON: 

Chest radiograph 9/20/2019



FINDINGS:



SUPPORT DEVICES: Stable, satisfactory device positioning.



HEART / MEDIASTINUM: Stable cardiomediastinal silhouette with normal heart size. Multiple coronary ar
pooja stents are present. 



LUNGS / PLEURA: No significant pulmonary or pleural abnormality. No pneumothorax. 



IMPRESSION: 

No acute finding. No significant change.



Signer Name: Roberto Turner MD 

Signed: 4/22/2020 7:20 PM

Workstation Name: [id number]

## 2020-04-22 NOTE — EMERGENCY DEPARTMENT REPORT
ED Chest Pain HPI





- General


Chief Complaint: Chest Pain


Stated Complaint: SOB/CHEST PAIN/SWELLING


Time Seen by Provider: 04/22/20 18:41


Source: patient


Mode of arrival: Ambulatory


Limitations: No Limitations





- History of Present Illness


Initial Comments: 





44-year-old female presents to the emergency department with complaint of left-

sided chest pain, shortness of breath and edema that has been going on since the

middle of last week.  The patient was admitted to [facility] and 

was just discharged from there yesterday, even though the patient says that she 

was not improved.  She has a past medical history of CHF, insulin-dependent 

diabetes, GERD, coronary artery disease with multiple MI and coronary stents, 

hypertension, CKD, hypothyroidism.  Patient says that she has seen both [facility]

and [facility] but prefers [facility] cardiology.  No recent travel.  The

patient is on multiple diuretics which she says she has been taking compliantly.

 She says "usually I just swell a little in my abdomen or my legs but right now 

I am swollen from my head to my toes."


Severity scale (0 -10): 9





- Related Data


                                Home Medications











 Medication  Instructions  Recorded  Confirmed  Last Taken


 


Isosorbide Mononitrate [Isosorbide 120 mg PO DAILY 02/05/15 09/20/19 09/20/19





Mononitrate ER (IMDUR)]    


 


amLODIPine 5 mg PO QAM 04/26/17 09/20/19 09/20/19


 


Insulin Glulisine [Apidra] 0 units SC QDAY 06/22/17 09/20/19 09/20/19


 


Levothyroxine [Synthroid] 100 mcg PO QDAY 06/22/17 09/20/19 09/20/19








                                  Previous Rx's











 Medication  Instructions  Recorded  Last Taken  Type


 


Potassium Chloride [K-Dur] 20 meq PO BID #60 tab 03/20/16 09/20/19 Rx


 


ALPRAZolam [Xanax TAB] 0.5 mg PO Q8H PRN #15 tablet 04/27/17 09/20/19 Rx


 


Insulin NPH, Human [NovoLIN N] 35 unit SUB-Q QAM #30 units 04/27/17 09/20/19 Rx


 


Sertraline [Zoloft] 50 mg PO QDAY #30 tablet 04/27/17 09/20/19 Rx


 


Torsemide [Demadex] 100 mg PO QDAY #30 tablet 04/27/17 09/20/19 Rx


 


Aspirin [Aspirin BABY CHEW TAB] 81 mg PO QDAY  tab.chew 08/30/17 09/20/19 Rx


 


AtorvaSTATin [Lipitor] 80 mg PO QHS  tablet 08/30/17 09/20/19 Rx


 


Cholecalciferol Vit D3 [Vitamin D3 1,000 unit PO QDAY  tablet 08/30/17 09/15/19 

Rx





1,000 UNIT TAB]    


 


Clopidogrel [Plavix] 75 mg PO QDAY  tablet 08/30/17 09/20/19 Rx


 


Ezetimibe [Zetia] 10 mg PO QDAY  tablet 08/30/17 09/20/19 Rx


 


Ferrous Sulfate [Feosol 325 MG tab] 325 mg PO QDAY  tablet 08/30/17 09/20/19 Rx


 


Gabapentin 600 mg PO TID  capsule 08/30/17 09/20/19 Rx


 


Spironolactone [Aldactone] 50 mg PO QDAY  tablet 08/30/17 09/20/19 Rx


 


Metoprolol Xl [Metoprolol 25 mg PO QDAY #30 tablet 10/03/17 09/20/19 Rx





SUCCINATE ER TAB]    


 


traZODone [Desyrel] 100 mg PO QHS #1 tablet 10/03/17 09/20/19 Rx


 


diphenhydrAMINE [Benadryl CAP] 50 mg PO Q6H PRN #30 capsule 09/21/19 Unknown Rx


 


traMADoL [Ultram] 50 mg PO Q6HR PRN #14 tablet 09/21/19 Unknown Rx











                                    Allergies











Allergy/AdvReac Type Severity Reaction Status Date / Time


 


adhesive tape Allergy  Rash Verified 10/12/17 11:13


 


bupivacaine Allergy  Angioedema Verified 10/12/17 11:13


 


ketorolac [From Toradol] Allergy  Hives Verified 04/22/20 18:24


 


Sulfa (Sulfonamide Allergy  Nausea Verified 10/12/17 11:13





Antibiotics)     


 


famotidine [From Pepcid] AdvReac  Vomiting Verified 10/12/17 11:13














Heart Score





- HEART Score


History: Moderately suspicious


EKG: Non-specific


Age: < 45


Risk factors: > 3 risk factors or hx of atherosclerotic disease


Troponin: < normal limit


HEART Score: 4





- Critical Actions


Critical Actions: 4-6 pts:12-16.6% risk of adverse cardiac event. Should be 

admitted





ED Review of Systems


ROS: 


Stated complaint: SOB/CHEST PAIN/SWELLING


Other details as noted in HPI





Comment: All other systems reviewed and negative


Constitutional: denies: chills, fever


Eyes: denies: eye pain, vision change


ENT: denies: ear pain, throat pain


Respiratory: shortness of breath.  denies: cough


Cardiovascular: chest pain, edema


Gastrointestinal: denies: abdominal pain, vomiting


Genitourinary: denies: dysuria, discharge


Musculoskeletal: denies: back pain, arthralgia


Skin: denies: rash, lesions


Neurological: denies: headache, weakness





ED Past Medical Hx





- Past Medical History


Previous Medical History?: Yes


Hx Hypertension: Yes (CHF EF 15-20%)


Hx Heart Attack/AMI: Yes (reports 9)


Hx Congestive Heart Failure: Yes


Hx Diabetes: Yes


Hx GERD: Yes


Hx Liver Disease: No


Hx Renal Disease: Yes (CKD, stage 2)


Hx Seizures: No


Hx Asthma: No


Hx COPD: No


Hx Tuberculosis: No


Hx HIV: No


Additional medical history: CAD, gatroparesis, hypOthyroidism, retinopathy, 

neuropathy, elevated cholesterol.  15 stents





- Surgical History


Past Surgical History?: Yes


Hx Coronary Stent: Yes (15)


Hx Pacemaker: Yes


Hx Internal Defibrillator: Yes (AICD)


Additional Surgical History: tubal ligation, left fallopian tube removed, right 

knee surgery, right arm surgery, left eye surgeryPOWER PORT,Pacemaker  Right 

ring finger surgery.DexCom continueous glucose meter, right abd





- Social History


Smoking Status: Never Smoker


Substance Use Type: None





- Medications


Home Medications: 


                                Home Medications











 Medication  Instructions  Recorded  Confirmed  Last Taken  Type


 


Isosorbide Mononitrate [Isosorbide 120 mg PO DAILY 02/05/15 09/20/19 09/20/19 

History





Mononitrate ER (IMDUR)]     


 


Potassium Chloride [K-Dur] 20 meq PO BID #60 tab 03/20/16 09/20/19 09/20/19 Rx


 


amLODIPine 5 mg PO QAM 04/26/17 09/20/19 09/20/19 History


 


ALPRAZolam [Xanax TAB] 0.5 mg PO Q8H PRN #15 tablet 04/27/17 09/20/19 09/20/19 

Rx


 


Insulin NPH, Human [NovoLIN N] 35 unit SUB-Q QAM #30 units 04/27/17 09/20/19 09/20/19 Rx


 


Sertraline [Zoloft] 50 mg PO QDAY #30 tablet 04/27/17 09/20/19 09/20/19 Rx


 


Torsemide [Demadex] 100 mg PO QDAY #30 tablet 04/27/17 09/20/19 09/20/19 Rx


 


Insulin Glulisine [Apidra] 0 units SC QDAY 06/22/17 09/20/19 09/20/19 History


 


Levothyroxine [Synthroid] 100 mcg PO QDAY 06/22/17 09/20/19 09/20/19 History


 


Aspirin [Aspirin BABY CHEW TAB] 81 mg PO QDAY  tab.chew 08/30/17 09/20/19 09/20/19 Rx


 


AtorvaSTATin [Lipitor] 80 mg PO QHS  tablet 08/30/17 09/20/19 09/20/19 Rx


 


Cholecalciferol Vit D3 [Vitamin D3 1,000 unit PO QDAY  tablet 08/30/17 09/20/19 

09/15/19 Rx





1,000 UNIT TAB]     


 


Clopidogrel [Plavix] 75 mg PO QDAY  tablet 08/30/17 09/20/19 09/20/19 Rx


 


Ezetimibe [Zetia] 10 mg PO QDAY  tablet 08/30/17 09/20/19 09/20/19 Rx


 


Ferrous Sulfate [Feosol 325 MG tab] 325 mg PO QDAY  tablet 08/30/17 09/20/19 09/20/19 Rx


 


Gabapentin 600 mg PO TID  capsule 08/30/17 09/20/19 09/20/19 Rx


 


Spironolactone [Aldactone] 50 mg PO QDAY  tablet 08/30/17 09/20/19 09/20/19 Rx


 


Metoprolol Xl [Metoprolol 25 mg PO QDAY #30 tablet 10/03/17 09/20/19 09/20/19 Rx





SUCCINATE ER TAB]     


 


traZODone [Desyrel] 100 mg PO QHS #1 tablet 10/03/17 09/20/19 09/20/19 Rx


 


diphenhydrAMINE [Benadryl CAP] 50 mg PO Q6H PRN #30 capsule 09/21/19  Unknown Rx


 


traMADoL [Ultram] 50 mg PO Q6HR PRN #14 tablet 09/21/19  Unknown Rx














ED Physical Exam





- General


Limitations: No Limitations





- Other


Other exam information: 





GENERAL: The patient is well-developed well-nourished.


HENT: Normocephalic.  Atraumatic.    Patient has moist mucous membranes.


EYES: Extraocular motions are intact.


NECK: Supple. Trachea is midline.


CHEST/LUNGS: Clear to auscultation.  There is no respiratory distress noted.


HEART/CARDIOVASCULAR: Regular.  There is no tachycardia.  There is no murmur.


ABDOMEN: Abdomen is soft, nontender.  Patient has normal bowel sounds.  Obese 

habitus.


SKIN: Skin is warm and dry.  There is nonpitting swelling of the bilateral lower

 extremities and lower half of the abdomen.


NEURO: The patient is awake, alert, and oriented.  The patient is cooperative.  

The patient has no focal neurologic deficits.  Normal speech.


MUSCULOSKELETAL: There is no tenderness or deformity.  There is no evidence of 

acute injury.





ED Course


                                   Vital Signs











  04/22/20 04/22/20 04/22/20





  18:24 18:47 18:52


 


Temperature 98.6 F  


 


Pulse Rate 71 65 65


 


Respiratory 20 17 12





Rate   


 


Blood Pressure 110/71  


 


Blood Pressure   105/68





[Left]   


 


O2 Sat by Pulse 99 100 99





Oximetry   














  04/22/20 04/22/20 04/22/20





  19:29 19:30 20:00


 


Temperature 98.7 F  


 


Pulse Rate 61  60


 


Respiratory 18 17 16





Rate   


 


Blood Pressure   


 


Blood Pressure 101/49  104/53





[Left]   


 


O2 Sat by Pulse 100 100 98





Oximetry   














  04/22/20





  21:00


 


Temperature 


 


Pulse Rate 61


 


Respiratory 13





Rate 


 


Blood Pressure 


 


Blood Pressure 112/54





[Left] 


 


O2 Sat by Pulse 95





Oximetry 














ANURAG score





- Anurag Score


Age > 65: (0) No


Aspirin use within the Past 7 Days: (1) Yes


3 or more CAD Risk Factors: (1) Yes


2 or more Angina events in past 24 hrs: (1) Yes


Known CAD with more than 50% Stenosis: (0) No


Elevated Cardiac Markers: (0) No


ST Deviation Greater than 0.5mm: (0) No


ANURAG Score: 3





ED Medical Decision Making





- Lab Data


Result diagrams: 


                                 04/22/20 19:23





                                 04/22/20 19:23





- EKG Data


-: EKG Interpreted by Me


EKG shows normal: sinus rhythm, axis, intervals (Prolonged QT and QTc), QRS 

complexes (Nonspecific intraventricular conduction delay, Q waves to the septal 

and anterior leads), ST-T waves


Rate: normal





- EKG Data


When compared to previous EKG there are: no significant change


Interpretation: unchanged when compared t (9/20/19)





- Radiology Data


Radiology results: image reviewed


interpreted by me: 





Chest x-ray does not show any acute process.  There are no pleural effusions, 

obvious pneumonia and there is no pneumothorax.





- Medical Decision Making





This patient presents to the emergency department with continued left-sided 

chest pain, shortness of breath and edema.  EKG does not show any signs of ST 

elevation MI.  Patient's labs show renal insufficiency consistent with her 

chronic kidney disease and she has an elevated BNP of greater than 6000.  She 

was given some IV analgesia without much relief.  Chest x-ray does not show any 

pleural effusions, pneumonia, focal consolidation, pneumothorax, or any other 

acute process.  Patient has a moderate heart and ANURAG score.  For this reason, 

along with her continued chest pain and signs of CHF, the patient will be 

admitted to the hospital for further evaluation and treatment and was accepted 

for admission by the hospitalist, Dr. Stephen.


Critical Care Time: No


Critical care attestation.: 


If time is entered above; I have spent that time in minutes in the direct care 

of this critically ill patient, excluding procedure time.








ED Disposition


Clinical Impression: 


 Acute chest pain





Chronic renal insufficiency


Qualifiers:


 Chronic kidney disease stage: unspecified stage Qualified Code(s): N18.9 - 

Chronic kidney disease, unspecified





CHF exacerbation


Qualifiers:


 Heart failure type: systolic Qualified Code(s): I50.23 - Acute on chronic 

systolic (congestive) heart failure





Disposition: DC-09 OP ADMIT IP TO THIS HOSP


Is pt being admited?: Yes


Condition: Fair


Time of Disposition: 23:43

## 2020-04-23 LAB
BASOPHILS # (AUTO): 0.1 K/MM3 (ref 0–0.1)
BASOPHILS NFR BLD AUTO: 0.7 % (ref 0–1.8)
BUN SERPL-MCNC: 33 MG/DL (ref 7–17)
BUN/CREAT SERPL: 21 %
CALCIUM SERPL-MCNC: 8.7 MG/DL (ref 8.4–10.2)
EOSINOPHIL # BLD AUTO: 0.3 K/MM3 (ref 0–0.4)
EOSINOPHIL NFR BLD AUTO: 4 % (ref 0–4.3)
HCT VFR BLD CALC: 35.4 % (ref 30.3–42.9)
HEMOLYSIS INDEX: 1
HGB BLD-MCNC: 12 GM/DL (ref 10.1–14.3)
LYMPHOCYTES # BLD AUTO: 1.7 K/MM3 (ref 1.2–5.4)
LYMPHOCYTES NFR BLD AUTO: 24.5 % (ref 13.4–35)
MCHC RBC AUTO-ENTMCNC: 34 % (ref 30–34)
MCV RBC AUTO: 98 FL (ref 79–97)
MONOCYTES # (AUTO): 0.5 K/MM3 (ref 0–0.8)
MONOCYTES % (AUTO): 6.9 % (ref 0–7.3)
PLATELET # BLD: 258 K/MM3 (ref 140–440)
RBC # BLD AUTO: 3.61 M/MM3 (ref 3.65–5.03)

## 2020-04-23 RX ADMIN — Medication SCH ML: at 22:58

## 2020-04-23 RX ADMIN — FUROSEMIDE SCH MG: 10 INJECTION, SOLUTION INTRAVENOUS at 17:49

## 2020-04-23 RX ADMIN — INSULIN LISPRO SCH UNIT: 100 INJECTION, SOLUTION INTRAVENOUS; SUBCUTANEOUS at 17:48

## 2020-04-23 RX ADMIN — INSULIN LISPRO SCH UNIT: 100 INJECTION, SOLUTION INTRAVENOUS; SUBCUTANEOUS at 08:49

## 2020-04-23 RX ADMIN — SPIRONOLACTONE SCH MG: 50 TABLET ORAL at 13:04

## 2020-04-23 RX ADMIN — METOPROLOL SUCCINATE SCH MG: 100 TABLET, EXTENDED RELEASE ORAL at 13:04

## 2020-04-23 RX ADMIN — INSULIN LISPRO SCH UNIT: 100 INJECTION, SOLUTION INTRAVENOUS; SUBCUTANEOUS at 12:57

## 2020-04-23 RX ADMIN — INSULIN LISPRO SCH UNIT: 100 INJECTION, SOLUTION INTRAVENOUS; SUBCUTANEOUS at 23:04

## 2020-04-23 RX ADMIN — GABAPENTIN SCH MG: 400 CAPSULE ORAL at 23:07

## 2020-04-23 RX ADMIN — OXYCODONE AND ACETAMINOPHEN PRN TAB: 5; 325 TABLET ORAL at 23:03

## 2020-04-23 RX ADMIN — Medication SCH ML: at 11:19

## 2020-04-23 NOTE — PROGRESS NOTE
Assessment and Plan


Assessment and plan: 


--Acute on chronic systolic CHF, HPBI64-48% 


continue anti-failure medications, input output monitoring


Low-sodium diet, fluid restriction, cardiology consulted





--Chest pain/Coronary artery disease


Serial cardiac enzymes, EKG 


Continue current cardiac medications , 


follow cardiology evaluation and recommendations .





--Hypokalemia, replete potassium


Monitor electrolytes





--Mild hyponatremia;


Improvement since admission, closely monitor electrolytes





--Hypertension; blood pressures in the lower range


Monitor, resume antihypertensives as needed





--Type II diabetes


Accu-Chek sliding scale coverage ADA diet


Insulin as needed





--History of hypothyroidism;


Continue Synthroid





--DVT prophylaxis;


Lovenox





Monitor closely and adjust management as needed



































History


Interval history: 


Patient seen and examined at the bedside this morning


Patient's chart, tests and reports reviewed


Patient complains of mild shortness of breath


Denies chest pain


Alert awake oriented x3


Vital signs reviewed








Hospitalist Physical





- Constitutional


Vitals: 


                                        











Temp Pulse Resp BP Pulse Ox


 


 97.7 F   54 L  18   98/45   95 


 


 04/23/20 07:54  04/23/20 07:54  04/23/20 07:54  04/23/20 07:54  04/23/20 09:46














ANURAG score





- Anurag Score


Age > 65: (0) No


Aspirin use within the Past 7 Days: (1) Yes


3 or more CAD Risk Factors: (1) Yes


2 or more Angina events in past 24 hrs: (1) Yes


Known CAD with more than 50% Stenosis: (0) No


Elevated Cardiac Markers: (0) No


ST Deviation Greater than 0.5mm: (0) No


ANURAG Score: 3





Results





- Labs


CBC & Chem 7: 


                                 04/23/20 03:18





                                 04/23/20 03:18


Labs: 


                             Laboratory Last Values











WBC  6.9 K/mm3 (4.5-11.0)   04/23/20  03:18    


 


RBC  3.61 M/mm3 (3.65-5.03)  L  04/23/20  03:18    


 


Hgb  12.0 gm/dl (10.1-14.3)   04/23/20  03:18    


 


Hct  35.4 % (30.3-42.9)   04/23/20  03:18    


 


MCV  98 fl (79-97)  H  04/23/20  03:18    


 


MCH  33 pg (28-32)  H  04/23/20  03:18    


 


MCHC  34 % (30-34)   04/23/20  03:18    


 


RDW  15.6 % (13.2-15.2)  H  04/23/20  03:18    


 


Plt Count  258 K/mm3 (140-440)   04/23/20  03:18    


 


Lymph % (Auto)  24.5 % (13.4-35.0)   04/23/20  03:18    


 


Mono % (Auto)  6.9 % (0.0-7.3)   04/23/20  03:18    


 


Eos % (Auto)  4.0 % (0.0-4.3)   04/23/20  03:18    


 


Baso % (Auto)  0.7 % (0.0-1.8)   04/23/20  03:18    


 


Lymph #  1.7 K/mm3 (1.2-5.4)   04/23/20  03:18    


 


Mono #  0.5 K/mm3 (0.0-0.8)   04/23/20  03:18    


 


Eos #  0.3 K/mm3 (0.0-0.4)   04/23/20  03:18    


 


Baso #  0.1 K/mm3 (0.0-0.1)   04/23/20  03:18    


 


Seg Neutrophils %  63.9 % (40.0-70.0)   04/23/20  03:18    


 


Seg Neutrophils #  4.4 K/mm3 (1.8-7.7)   04/23/20  03:18    


 


PT  13.6 Sec. (12.2-14.9)   04/22/20  19:23    


 


INR  1.03  (0.87-1.13)   04/22/20  19:23    


 


APTT  28.1 Sec. (24.2-36.6)   04/22/20  19:23    


 


Sodium  137 mmol/L (137-145)   04/23/20  03:18    


 


Potassium  3.5 mmol/L (3.6-5.0)  L  04/23/20  03:18    


 


Chloride  93.8 mmol/L ()  L  04/23/20  03:18    


 


Carbon Dioxide  31 mmol/L (22-30)  H  04/23/20  03:18    


 


Anion Gap  16 mmol/L  04/23/20  03:18    


 


BUN  33 mg/dL (7-17)  H  04/23/20  03:18    


 


Creatinine  1.6 mg/dL (0.7-1.2)  H  04/23/20  03:18    


 


Estimated GFR  35 ml/min  04/23/20  03:18    


 


BUN/Creatinine Ratio  21 %  04/23/20  03:18    


 


Glucose  194 mg/dL ()  H  04/23/20  03:18    


 


POC Glucose  164  ()  H  04/23/20  07:57    


 


Calcium  8.7 mg/dL (8.4-10.2)   04/23/20  03:18    


 


Magnesium  1.90 mg/dL (1.7-2.3)   04/22/20  19:23    


 


Troponin T  < 0.010 ng/mL (0.00-0.029)   04/23/20  03:18    


 


NT-Pro-B Natriuret Pep  6139 pg/mL (0-450)  H  04/22/20  19:23    











Glynn/IV: 


                                        





Voiding Method                   Toilet


IV Catheter Type [Right Chest]   Infusaport











Active Medications





- Current Medications


Current Medications: 














Generic Name Dose Route Start Last Admin





  Trade Name Freq  PRN Reason Stop Dose Admin


 


Acetaminophen  650 mg  04/22/20 22:05 





  Tylenol  PO  





  Q4H PRN  





  Pain MILD(1-3)/Fever >100.5/HA  


 


Dextrose  0 ml  04/22/20 22:05 





  D50w (25gm) Syringe  IV  





  Q30MIN PRN  





  Hypoglycemia  





  Protocol  


 


Diphenhydramine HCl  50 mg  04/22/20 22:08  04/23/20 06:22





  Benadryl  IV   50 mg





  Q6H PRN   Administration





  Itching  


 


Enoxaparin Sodium  30 mg  04/23/20 10:00 





  Enoxaparin  SUB-Q  





  QDAY [facility]  


 


Insulin Human Lispro  0 unit  04/23/20 07:30  04/23/20 08:49





  Humalog  SUB-Q   3 unit





  ACHS MARI   Administration





  Protocol  


 


Ondansetron HCl  4 mg  04/22/20 22:05 





  Zofran  IV  





  Q4H PRN  





  Nausea And Vomiting  


 


Oxycodone/Acetaminophen  1 tab  04/22/20 22:05  04/22/20 23:07





  Percocet 5/325  PO   1 tab





  Q4H PRN   Administration





  Pain, Moderate (4-6)  


 


Pneumococcal Polyvalent Vaccine  0.5 ml  04/24/20 12:00 





  Pneumovax 23  IM  04/24/20 12:01 





  .ONCE ONE  


 


Sodium Chloride  10 ml  04/23/20 10:00 





  Sodium Chloride Flush Syringe 10 Ml  IV  





  BID MARI  


 


Sodium Chloride  10 ml  04/22/20 22:05 





  Sodium Chloride Flush Syringe 10 Ml  IV  





  PRN PRN  





  LINE FLUSH  


 


Trazodone HCl  100 mg  04/23/20 04:00  04/23/20 03:27





  Desyrel  PO   100 mg





  QHS MARI   Administration

## 2020-04-23 NOTE — CONSULTATION
History of Present Illness


Consult date: 04/23/20


Requesting physician: COMPA SHAFER


Consult reason: chest pain


History of present illness: 


The patient is a 43 YO female well known to us with a past medical history 

significant for CAD s/p MI and PCI (most recent PCI to RCA on 6/13/2017 @ 

[facility]), ICMP s/p ICD, chronic HFrEF, cardioMEMS in situ, recurrent chronic salas

na, CKD, HTN, hypothyroid, diabetes. She is followed regularly by [facility] 

[facility], Dr. Anderson. She presented with c/o chest pain, BLE swelling and SOB for 

the past several days. Pt was admitted at [facility] last Thursday for similar symptoms

and was discharged on Tuesday. She believes she was discharged prematurely 

because she was still SOB and had BLE swelling at discharge. She went to 

[place] [facility] on Wednesday and was sent home from ED. She called her cardiologist 

at [facility] yesterday to inform him of her symptoms and was told to report to a 

different ED and thus she decided to come to [facility]. 





Echo done at [facility] 4/16/2020 showed EF 25-30%, grade III diastolic dysfunction, RV

function mildly reduced. 


LHC done 3/2018 showed stable 3-vessel CAD with patent stents and 90% lesion of 

branching OM that appeared same as prior and too small to intervene upon. 








Past History


Past Medical History: other (as per HPI)





Medications and Allergies


                                    Allergies











Allergy/AdvReac Type Severity Reaction Status Date / Time


 


adhesive tape Allergy  Rash Verified 10/12/17 11:13


 


bupivacaine Allergy  Angioedema Verified 10/12/17 11:13


 


ketorolac [From Toradol] Allergy  Hives Verified 04/22/20 18:24


 


Sulfa (Sulfonamide Allergy  Nausea Verified 10/12/17 11:13





Antibiotics)     


 


famotidine [From Pepcid] AdvReac  Vomiting Verified 10/12/17 11:13











                                Home Medications











 Medication  Instructions  Recorded  Confirmed  Last Taken  Type


 


Isosorbide Mononitrate [Isosorbide 120 mg PO DAILY 02/05/15 04/23/20 09/20/19 

History





Mononitrate ER (IMDUR)]     


 


Potassium Chloride [K-Dur] 20 meq PO BID #60 tab 03/20/16 04/23/20 09/20/19 Rx


 


amLODIPine 5 mg PO QAM 04/26/17 04/23/20 09/20/19 History


 


ALPRAZolam [Xanax TAB] 0.5 mg PO Q8H PRN #15 tablet 04/27/17 04/23/20 09/20/19 

Rx


 


Insulin NPH, Human [NovoLIN N] 35 unit SUB-Q QAM #30 units 04/27/17 04/23/20 09/20/19 Rx


 


Sertraline [Zoloft] 50 mg PO QDAY #30 tablet 04/27/17 04/23/20 09/20/19 Rx


 


Torsemide [Demadex] 100 mg PO QDAY #30 tablet 04/27/17 04/23/20 09/20/19 Rx


 


Insulin Glulisine [Apidra] 0 units SC QDAY 06/22/17 04/23/20 09/20/19 History


 


Levothyroxine [Synthroid] 100 mcg PO QDAY 06/22/17 04/23/20 09/20/19 History


 


Aspirin [Aspirin BABY CHEW TAB] 81 mg PO QDAY  tab.chew 08/30/17 04/23/20 09/20/19 Rx


 


AtorvaSTATin [Lipitor] 80 mg PO QHS  tablet 08/30/17 04/23/20 09/20/19 Rx


 


Cholecalciferol Vit D3 [Vitamin D3 1,000 unit PO QDAY  tablet 08/30/17 04/23/20 

09/15/19 Rx





1,000 UNIT TAB]     


 


Clopidogrel [Plavix] 75 mg PO QDAY  tablet 08/30/17 04/23/20 09/20/19 Rx


 


Ezetimibe [Zetia] 10 mg PO QDAY  tablet 08/30/17 04/23/20 09/20/19 Rx


 


Ferrous Sulfate [Feosol 325 MG tab] 325 mg PO QDAY  tablet 08/30/17 04/23/20 09/20/19 Rx


 


Gabapentin 600 mg PO TID  capsule 08/30/17 04/23/20 09/20/19 Rx


 


Spironolactone [Aldactone] 50 mg PO QDAY  tablet 08/30/17 04/23/20 09/20/19 Rx


 


traZODone [Desyrel] 100 mg PO QHS #1 tablet 10/03/17 04/23/20 09/20/19 Rx


 


diphenhydrAMINE [Benadryl CAP] 50 mg PO Q6H PRN #30 capsule 09/21/19 04/23/20 U

nknown Rx


 


Metoprolol Xl [Metoprolol 25 mg PO QDAY 04/23/20 04/23/20 Unknown History





SUCCINATE ER TAB]     











Active Meds: 


Active Medications





Acetaminophen (Tylenol)  650 mg PO Q4H PRN


   PRN Reason: Pain MILD(1-3)/Fever >100.5/HA


Alprazolam (Xanax)  0.5 mg PO Q8H PRN


   PRN Reason: Anxiety


Aspirin (Baby Aspirin)  81 mg PO QDAY [facility]


Atorvastatin Calcium (Lipitor)  80 mg PO QHS [facility]


Clopidogrel Bisulfate (Plavix)  75 mg PO QDAY [facility]


Dextrose (D50w (25gm) Syringe)  0 ml IV Q30MIN PRN; Protocol


   PRN Reason: Hypoglycemia


Diphenhydramine HCl (Benadryl)  50 mg IV Q6H PRN


   PRN Reason: Itching


   Last Admin: 04/23/20 11:19 Dose:  50 mg


   Documented by: 


Enoxaparin Sodium (Enoxaparin)  30 mg SUB-Q QDAY [facility]


   Last Admin: 04/23/20 11:18 Dose:  30 mg


   Documented by: 


Furosemide (Lasix)  40 mg IV 0600,1800 [facility]


Insulin Human Lispro (Humalog)  0 unit SUB-Q [facility]; Protocol


   Last Admin: 04/23/20 08:49 Dose:  3 unit


   Documented by: 


Isosorbide Mononitrate (Imdur)  120 mg PO DAILY [facility]


Metoprolol Succinate (Metoprolol Xl)  100 mg PO QDAY [facility]


Ondansetron HCl (Zofran)  4 mg IV Q4H PRN


   PRN Reason: Nausea And Vomiting


Oxycodone/Acetaminophen (Percocet 5/325)  1 tab PO Q4H PRN


   PRN Reason: Pain, Moderate (4-6)


   Last Admin: 04/22/20 23:07 Dose:  1 tab


   Documented by: 


Pneumococcal Polyvalent Vaccine (Pneumovax 23)  0.5 ml IM .ONCE ONE


   Stop: 04/24/20 12:01


Sodium Chloride (Sodium Chloride Flush Syringe 10 Ml)  10 ml IV BID [facility]


   Last Admin: 04/23/20 11:19 Dose:  10 ml


   Documented by: 


Sodium Chloride (Sodium Chloride Flush Syringe 10 Ml)  10 ml IV PRN PRN


   PRN Reason: LINE FLUSH


Spironolactone (Aldactone)  50 mg PO QDAY [facility]


Trazodone HCl (Desyrel)  100 mg PO QHS [facility]


   Last Admin: 04/23/20 03:27 Dose:  100 mg


   Documented by: 











Review of Systems


Constitutional: no fever, no chills, no sweats


Ears, nose, mouth and throat: no ear pain, no nose pain, no sinus pressure, no 

sinus pain


Cardiovascular: chest pain, orthopnea, edema, shortness of breath, dyspnea on 

exertion, leg edema, no palpitations, no rapid/irregular heart beat, no syncope,

no lightheadedness, no high blood pressure


Respiratory: shortness of breath, dyspnea on exertion, no cough, no congestion, 

no wheezing, no pain on inspiration


Gastrointestinal: no abdominal pain, no nausea, no vomiting, no diarrhea, no 

constipation, no change in bowel habits


Genitourinary Female: no pelvic pain, no flank pain, no dysuria, no urinary 

frequency, no urgency


Musculoskeletal: no neck stiffness, no neck pain, no shooting arm pain, no arm 

numbness/tingling, no low back pain, no shooting leg pain


Integumentary: no rash, no pruritis, no redness, no sores, no wounds


Neurological: no head injury, no paralysis, no weakness, no parathesias, no 

numbness, no tingling, no seizures, no syncope


Psychiatric: no anxiety


Endocrine: no cold intolerance, no heat intolerance


Hematologic/Lymphatic: no easy bruising


Allergic/Immunologic: no urticaria





Physical Examination


                                   Vital Signs











Temp Pulse Resp BP Pulse Ox


 


 98.6 F   71   20   110/71   99 


 


 04/22/20 18:24  04/22/20 18:24  04/22/20 18:24  04/22/20 18:24  04/22/20 18:24











General appearance: no acute distress


HEENT: Positive: PERRL, Normocephaly, Mucus Membranes Moist


Neck: Positive: neck supple, trachea midline


Cardiac: Positive: Reg Rate and Rhythm, S1/S2


Lungs: Positive: Decreased Breath Sounds


Neuro: Positive: Grossly Intact


Abdomen: Negative: Tender


Skin: Negative: Rash


Musculoskeletal: No Pain


Extremities: Present: +1 Edema (BLE)





Results





                                 04/23/20 03:18





                                 04/23/20 03:18


                                   Coagulation











  04/22/20 Range/Units





  19:23 


 


PT  13.6  (12.2-14.9)  Sec.


 


INR  1.03  (0.87-1.13)  


 


APTT  28.1  (24.2-36.6)  Sec.








                                       CBC











  04/22/20 04/23/20 Range/Units





  19:23 03:18 


 


WBC  7.2  6.9  (4.5-11.0)  K/mm3


 


RBC  3.72  3.61 L  (3.65-5.03)  M/mm3


 


Hgb  12.3  12.0  (10.1-14.3)  gm/dl


 


Hct  36.6  35.4  (30.3-42.9)  %


 


Plt Count  256  258  (140-440)  K/mm3


 


Lymph #  1.3  1.7  (1.2-5.4)  K/mm3


 


Mono #  0.5  0.5  (0.0-0.8)  K/mm3


 


Eos #  0.2  0.3  (0.0-0.4)  K/mm3


 


Baso #  0.1  0.1  (0.0-0.1)  K/mm3








                          Comprehensive Metabolic Panel











  04/22/20 04/23/20 Range/Units





  19:23 03:18 


 


Sodium  136 L  137  (137-145)  mmol/L


 


Potassium  3.5 L  3.5 L  (3.6-5.0)  mmol/L


 


Chloride  94.0 L  93.8 L  ()  mmol/L


 


Carbon Dioxide  29  31 H  (22-30)  mmol/L


 


BUN  31 H  33 H  (7-17)  mg/dL


 


Creatinine  1.5 H  1.6 H  (0.7-1.2)  mg/dL


 


Glucose  305 H  194 H  ()  mg/dL


 


Calcium  8.5  8.7  (8.4-10.2)  mg/dL














- Imaging and Cardiology


Echo: report reviewed ([facility] 4/16/2020 showed EF 25-30%, grade III diastolic 

dysfunction, RV function mildly reduced. )


Cardiac cath: report reviewed (3/2018 showed stable 3-vessel CAD with patent 

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 

to intervene upon. )


EKG: report reviewed, image reviewed





EKG interpretations





- Telemetry


EKG Rhythm: Sinus Rhythm





- EKG


Sinus rhythms and dysrhythmias: sinus rhythm





Assessment and Plan


Cont diuresis and GDMT as tolerated. F/u BMP in AM.


No plans for additional cardiac w/u at this time. 


Will follow. 





The patient has been seen in conjunction with Dr. Morse who agrees with the 

assessment and plan of care.








- Patient Problems


(1) Acute on chronic systolic HF (heart failure)


Current Visit: Yes   Status: Acute   





(2) Ischemic cardiomyopathy


Current Visit: Yes   Status: Chronic   





(3) Automatic implantable cardioverter-defibrillator in situ


Current Visit: Yes   Status: Chronic   





(4) CAD (coronary artery disease)


Current Visit: Yes   Status: Chronic   


Qualifiers: 


   Coronary Disease-Associated Artery/Lesion type: native artery   Native vs. 

transplanted heart: native heart 





(5) Stented coronary artery


Current Visit: Yes   Status: Chronic   





(6) Renal insufficiency


Current Visit: Yes   Status: Acute   





(7) Diabetes


Current Visit: Yes   Status: Chronic   





(8) Hypothyroidism


Current Visit: Yes   Status: Chronic

## 2020-04-24 LAB
BUN SERPL-MCNC: 28 MG/DL (ref 7–17)
BUN/CREAT SERPL: 25 %
CALCIUM SERPL-MCNC: 8.9 MG/DL (ref 8.4–10.2)
HEMOLYSIS INDEX: 4

## 2020-04-24 RX ADMIN — INSULIN LISPRO SCH UNIT: 100 INJECTION, SOLUTION INTRAVENOUS; SUBCUTANEOUS at 16:02

## 2020-04-24 RX ADMIN — ENOXAPARIN SODIUM SCH MG: 100 INJECTION SUBCUTANEOUS at 11:37

## 2020-04-24 RX ADMIN — GABAPENTIN SCH MG: 400 CAPSULE ORAL at 13:30

## 2020-04-24 RX ADMIN — SPIRONOLACTONE SCH MG: 50 TABLET ORAL at 13:31

## 2020-04-24 RX ADMIN — SERTRALINE SCH MG: 50 TABLET, FILM COATED ORAL at 11:36

## 2020-04-24 RX ADMIN — GABAPENTIN SCH MG: 400 CAPSULE ORAL at 08:30

## 2020-04-24 RX ADMIN — Medication SCH ML: at 11:36

## 2020-04-24 RX ADMIN — EZETIMIBE SCH MG: 10 TABLET ORAL at 11:36

## 2020-04-24 RX ADMIN — INSULIN LISPRO SCH UNIT: 100 INJECTION, SOLUTION INTRAVENOUS; SUBCUTANEOUS at 08:29

## 2020-04-24 RX ADMIN — FUROSEMIDE SCH MG: 10 INJECTION, SOLUTION INTRAVENOUS at 17:56

## 2020-04-24 RX ADMIN — METOPROLOL SUCCINATE SCH MG: 100 TABLET, EXTENDED RELEASE ORAL at 13:35

## 2020-04-24 RX ADMIN — ASPIRIN SCH MG: 81 TABLET, CHEWABLE ORAL at 11:37

## 2020-04-24 RX ADMIN — Medication SCH ML: at 22:24

## 2020-04-24 RX ADMIN — CLOPIDOGREL BISULFATE SCH MG: 75 TABLET ORAL at 11:36

## 2020-04-24 RX ADMIN — INSULIN LISPRO SCH UNIT: 100 INJECTION, SOLUTION INTRAVENOUS; SUBCUTANEOUS at 13:25

## 2020-04-24 RX ADMIN — FUROSEMIDE SCH MG: 10 INJECTION, SOLUTION INTRAVENOUS at 06:00

## 2020-04-24 RX ADMIN — GABAPENTIN SCH MG: 400 CAPSULE ORAL at 22:23

## 2020-04-24 RX ADMIN — INSULIN LISPRO SCH UNIT: 100 INJECTION, SOLUTION INTRAVENOUS; SUBCUTANEOUS at 22:23

## 2020-04-24 RX ADMIN — LEVOTHYROXINE SODIUM SCH MCG: 0.1 TABLET ORAL at 11:36

## 2020-04-24 NOTE — PROGRESS NOTE
Assessment and Plan


Assessment and plan: 


--Chest pain/Coronary artery disease


Serial cardiac enzymes, EKG 


Continue current cardiac medications , 


Cardiology not planning any work-up during this admission


Symptoms significantly improved





--Acute on chronic systolic CHF, WFPA01-20% 


continue anti-failure medications, input output monitoring


Low-sodium diet, fluid restriction, cardiology consulted





--Type II diabetes; uncontrolled


Accu-Chek sliding scale coverage ADA diet


Lantus twice daily dose, A1c more than 9





--Hypokalemia, replete potassium


Monitor electrolytes





--Mild hyponatremia;


Improvement since admission, closely monitor electrolytes





--Hypertension; blood pressures in the lower range


Monitor, resume antihypertensives as needed





--History of hypothyroidism;


Continue Synthroid





--DVT prophylaxis;


Lovenox





Monitor closely and adjust management as needed


Possible discharge home tomorrow if stable











History


Interval history: 


Patient seen and examined in her room this afternoon


Patient's chart and medications reviewed


Feels slightly better


Cardiology adjust the medication


Denies chest pain or shortness of breath


Vital signs reviewed








Hospitalist Physical





- Constitutional


Vitals: 


                                        











Temp Pulse Resp BP Pulse Ox


 


 98.0 F   630 H  18   110/38   98 


 


 04/24/20 07:37  04/24/20 11:35  04/24/20 07:37  04/24/20 11:35  04/24/20 07:37











General appearance: Present: no acute distress, well-nourished, obese (Morbidly 

obese)





- EENT


Eyes: Present: PERRL, EOM intact





- Neck


Neck: Present: supple, normal ROM





- Respiratory


Respiratory effort: normal


Respiratory: bilateral: diminished, rales, negative: rhonchi, wheezing





- Cardiovascular


Rhythm: regular


Heart Sounds: Present: S1 & S2





- Extremities


Extremities: no ischemia, No edema





- Abdominal


General gastrointestinal: soft, non-tender, non-distended, normal bowel sounds





- Integumentary


Integumentary: Present: clear, warm





- Psychiatric


Psychiatric: appropriate mood/affect, cooperative





- Neurologic


Neurologic: CNII-XII intact, moves all extremities





ANURAG score





- Anurag Score


Age > 65: (0) No


Aspirin use within the Past 7 Days: (1) Yes


3 or more CAD Risk Factors: (1) Yes


2 or more Angina events in past 24 hrs: (1) Yes


Known CAD with more than 50% Stenosis: (0) No


Elevated Cardiac Markers: (0) No


ST Deviation Greater than 0.5mm: (0) No


ANURAG Score: 3





Results





- Labs


CBC & Chem 7: 


                                 04/23/20 03:18





                                 04/24/20 04:14


Labs: 


                             Laboratory Last Values











WBC  6.9 K/mm3 (4.5-11.0)   04/23/20  03:18    


 


RBC  3.61 M/mm3 (3.65-5.03)  L  04/23/20  03:18    


 


Hgb  12.0 gm/dl (10.1-14.3)   04/23/20  03:18    


 


Hct  35.4 % (30.3-42.9)   04/23/20  03:18    


 


MCV  98 fl (79-97)  H  04/23/20  03:18    


 


MCH  33 pg (28-32)  H  04/23/20  03:18    


 


MCHC  34 % (30-34)   04/23/20  03:18    


 


RDW  15.6 % (13.2-15.2)  H  04/23/20  03:18    


 


Plt Count  258 K/mm3 (140-440)   04/23/20  03:18    


 


Lymph % (Auto)  24.5 % (13.4-35.0)   04/23/20  03:18    


 


Mono % (Auto)  6.9 % (0.0-7.3)   04/23/20  03:18    


 


Eos % (Auto)  4.0 % (0.0-4.3)   04/23/20  03:18    


 


Baso % (Auto)  0.7 % (0.0-1.8)   04/23/20  03:18    


 


Lymph #  1.7 K/mm3 (1.2-5.4)   04/23/20  03:18    


 


Mono #  0.5 K/mm3 (0.0-0.8)   04/23/20  03:18    


 


Eos #  0.3 K/mm3 (0.0-0.4)   04/23/20  03:18    


 


Baso #  0.1 K/mm3 (0.0-0.1)   04/23/20  03:18    


 


Seg Neutrophils %  63.9 % (40.0-70.0)   04/23/20  03:18    


 


Seg Neutrophils #  4.4 K/mm3 (1.8-7.7)   04/23/20  03:18    


 


PT  13.6 Sec. (12.2-14.9)   04/22/20  19:23    


 


INR  1.03  (0.87-1.13)   04/22/20  19:23    


 


APTT  28.1 Sec. (24.2-36.6)   04/22/20  19:23    


 


Sodium  137 mmol/L (137-145)   04/24/20  04:14    


 


Potassium  3.3 mmol/L (3.6-5.0)  L  04/24/20  04:14    


 


Chloride  97.5 mmol/L ()  L  04/24/20  04:14    


 


Carbon Dioxide  28 mmol/L (22-30)   04/24/20  04:14    


 


Anion Gap  15 mmol/L  04/24/20  04:14    


 


BUN  28 mg/dL (7-17)  H  04/24/20  04:14    


 


Creatinine  1.1 mg/dL (0.7-1.2)   04/24/20  04:14    


 


Estimated GFR  54 ml/min  04/24/20  04:14    


 


BUN/Creatinine Ratio  25 %  04/24/20  04:14    


 


Glucose  202 mg/dL ()  H  04/24/20  04:14    


 


POC Glucose  326  ()  H  04/24/20  11:49    


 


Calcium  8.9 mg/dL (8.4-10.2)   04/24/20  04:14    


 


Magnesium  1.90 mg/dL (1.7-2.3)   04/22/20  19:23    


 


Troponin T  < 0.010 ng/mL (0.00-0.029)   04/23/20  03:18    


 


NT-Pro-B Natriuret Pep  6139 pg/mL (0-450)  H  04/22/20  19:23    











Glynn/IV: 


                                        





Voiding Method                   Toilet


IV Catheter Type [Right Chest]   Infusaport











Active Medications





- Current Medications


Current Medications: 














Generic Name Dose Route Start Last Admin





  Trade Name Freq  PRN Reason Stop Dose Admin


 


Acetaminophen  650 mg  04/22/20 22:05 





  Tylenol  PO  





  Q4H PRN  





  Pain MILD(1-3)/Fever >100.5/HA  


 


Alprazolam  0.5 mg  04/23/20 12:00 





  Xanax  PO  





  Q8H PRN  





  Anxiety  


 


Aspirin  81 mg  04/24/20 10:00  04/24/20 11:37





  Baby Aspirin  PO   81 mg





  QDAY MARI   Administration


 


Atorvastatin Calcium  80 mg  04/23/20 22:00  04/23/20 22:58





  Lipitor  PO   80 mg





  QHS MARI   Administration


 


Clopidogrel Bisulfate  75 mg  04/24/20 10:00  04/24/20 11:36





  Plavix  PO   75 mg





  QDAY MARI   Administration


 


Dextrose  0 ml  04/22/20 22:05 





  D50w (25gm) Syringe  IV  





  Q30MIN PRN  





  Hypoglycemia  





  Protocol  


 


Diphenhydramine HCl  50 mg  04/22/20 22:08  04/24/20 11:41





  Benadryl  IV   50 mg





  Q6H PRN   Administration





  Itching  


 


Ezetimibe  10 mg  04/24/20 10:00  04/24/20 11:36





  Zetia  PO   10 mg





  QDAY MARI   Administration


 


Enoxaparin Sodium  40 mg  04/24/20 10:00  04/24/20 11:37





  Enoxaparin  SUB-Q   40 mg





  QDAY@1000 MARI   Administration


 


Furosemide  40 mg  04/23/20 18:00  04/24/20 06:00





  Lasix  IV   40 mg





  0600,1800 MARI   Administration


 


Gabapentin  600 mg  04/23/20 20:00  04/24/20 08:30





  Gabapentin  PO   600 mg





  TID MARI   Administration


 


Insulin Glargine  10 units  04/24/20 12:12 





  Lantus  SUB-Q  04/24/20 12:13 





  ONCE ONE  


 


Insulin Human Lispro  0 unit  04/23/20 07:30  04/24/20 08:29





  Humalog  SUB-Q   4 unit





  AC[facility] MARI   Administration





  Protocol  


 


Insulin Human Lispro  8 unit  04/24/20 16:30 





  Humalog  SUB-Q  





  AC [facility]  


 


Isosorbide Mononitrate  120 mg  04/24/20 10:00  04/24/20 11:35





  Imdur  PO   120 mg





  DAILY MARI   Administration


 


Levothyroxine Sodium  100 mcg  04/24/20 10:00  04/24/20 11:36





  Synthroid  PO   100 mcg





  QDAY MARI   Administration


 


Metoprolol Succinate  100 mg  04/23/20 13:00  04/23/20 13:04





  Metoprolol Xl  PO   100 mg





  QDAY MARI   Administration


 


Ondansetron HCl  4 mg  04/22/20 22:05 





  Zofran  IV  





  Q4H PRN  





  Nausea And Vomiting  


 


Oxycodone/Acetaminophen  1 tab  04/22/20 22:05  04/23/20 23:03





  Percocet 5/325  PO   1 tab





  Q4H PRN   Administration





  Pain, Moderate (4-6)  


 


Sertraline HCl  50 mg  04/24/20 10:00  04/24/20 11:36





  Zoloft  PO   50 mg





  QDAY MARI   Administration


 


Sodium Chloride  10 ml  04/23/20 10:00  04/24/20 11:36





  Sodium Chloride Flush Syringe 10 Ml  IV   10 ml





  BID MARI   Administration


 


Sodium Chloride  10 ml  04/22/20 22:05 





  Sodium Chloride Flush Syringe 10 Ml  IV  





  PRN PRN  





  LINE FLUSH  


 


Spironolactone  50 mg  04/23/20 13:00  04/23/20 13:04





  Aldactone  PO   50 mg





  QDAY MARI   Administration


 


Trazodone HCl  100 mg  04/23/20 04:00  04/23/20 22:58





  Desyrel  PO   100 mg





  QHS MARI   Administration


 


Trazodone HCl  100 mg  04/23/20 22:00  04/23/20 22:59





  Desyrel  PO   100 mg





  QHS MARI   Administration

## 2020-04-24 NOTE — PROGRESS NOTE
Assessment and Plan


Cont diuresis and GDMT as tolerated. F/u BMP in AM.


No plans for additional cardiac w/u at this time. 


Anticipate d/c in AM.  





The patient has been seen in conjunction with Dr. Morse who agrees with the 

assessment and plan of care.








- Patient Problems


(1) Acute on chronic systolic HF (heart failure)


Current Visit: Yes   Status: Acute   





(2) Ischemic cardiomyopathy


Current Visit: Yes   Status: Chronic   





(3) Automatic implantable cardioverter-defibrillator in situ


Current Visit: Yes   Status: Chronic   





(4) CAD (coronary artery disease)


Current Visit: Yes   Status: Chronic   


Qualifiers: 


   Coronary Disease-Associated Artery/Lesion type: native artery   Native vs. 

transplanted heart: native heart 





(5) Stented coronary artery


Current Visit: Yes   Status: Chronic   





(6) Renal insufficiency


Current Visit: Yes   Status: Acute   





(7) Diabetes


Current Visit: Yes   Status: Chronic   





(8) Hypothyroidism


Current Visit: Yes   Status: Chronic   





Subjective


Date of service: 04/24/20


Principal diagnosis: hf


Interval history: 


pt resting in bed, SOB improving, chest pain resolved. in SR on tele HR 50s - 

60s. 








Objective





                                Last Vital Signs











Temp  98.0 F   04/24/20 07:37


 


Pulse  53 L  04/24/20 07:37


 


Resp  18   04/24/20 07:37


 


BP  107/55   04/24/20 07:37


 


Pulse Ox  98   04/24/20 07:37

















- Physical Examination


General: No Apparent Distress


HEENT: Positive: PERRL, Normocephaly, Mucus Membranes Moist


Neck: Positive: neck supple, trachea midline


Cardiac: Positive: Reg Rate and Rhythm, S1/S2


Lungs: Positive: Decreased Breath Sounds


Neuro: Positive: Grossly Intact


Abdomen: Negative: Tender


Skin: Negative: Rash


Musculoskeletal: No Pain


Extremities: Present: +1 Edema (BLE)





- Labs and Meds


                          Comprehensive Metabolic Panel











  04/24/20 Range/Units





  04:14 


 


Sodium  137  (137-145)  mmol/L


 


Potassium  3.3 L  (3.6-5.0)  mmol/L


 


Chloride  97.5 L  ()  mmol/L


 


Carbon Dioxide  28  (22-30)  mmol/L


 


BUN  28 H  (7-17)  mg/dL


 


Creatinine  1.1  (0.7-1.2)  mg/dL


 


Glucose  202 H  ()  mg/dL


 


Calcium  8.9  (8.4-10.2)  mg/dL














- Imaging and Cardiology


EKG: report reviewed, image reviewed


Echo: report reviewed ([facility] 4/16/2020 showed EF 25-30%, grade III diastolic 

dysfunction, RV function mildly reduced. )


Cardiac cath: report reviewed (3/2018 showed stable 3-vessel CAD with patent 

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 

to intervene upon. )





- EKG


Sinus rhythms and dysrhythmias: sinus rhythm

## 2020-04-25 VITALS — SYSTOLIC BLOOD PRESSURE: 101 MMHG | DIASTOLIC BLOOD PRESSURE: 52 MMHG

## 2020-04-25 LAB
BUN SERPL-MCNC: 30 MG/DL (ref 7–17)
BUN/CREAT SERPL: 21 %
CALCIUM SERPL-MCNC: 9.1 MG/DL (ref 8.4–10.2)
HEMOLYSIS INDEX: 14

## 2020-04-25 RX ADMIN — INSULIN LISPRO SCH UNIT: 100 INJECTION, SOLUTION INTRAVENOUS; SUBCUTANEOUS at 08:36

## 2020-04-25 RX ADMIN — Medication SCH ML: at 10:50

## 2020-04-25 RX ADMIN — ASPIRIN SCH MG: 81 TABLET, CHEWABLE ORAL at 10:50

## 2020-04-25 RX ADMIN — SPIRONOLACTONE SCH MG: 50 TABLET ORAL at 10:50

## 2020-04-25 RX ADMIN — LEVOTHYROXINE SODIUM SCH MCG: 0.1 TABLET ORAL at 10:50

## 2020-04-25 RX ADMIN — METOPROLOL SUCCINATE SCH MG: 100 TABLET, EXTENDED RELEASE ORAL at 10:50

## 2020-04-25 RX ADMIN — GABAPENTIN SCH MG: 400 CAPSULE ORAL at 13:37

## 2020-04-25 RX ADMIN — FUROSEMIDE SCH MG: 10 INJECTION, SOLUTION INTRAVENOUS at 06:00

## 2020-04-25 RX ADMIN — INSULIN LISPRO SCH UNIT: 100 INJECTION, SOLUTION INTRAVENOUS; SUBCUTANEOUS at 12:49

## 2020-04-25 RX ADMIN — CLOPIDOGREL BISULFATE SCH MG: 75 TABLET ORAL at 10:50

## 2020-04-25 RX ADMIN — GABAPENTIN SCH MG: 400 CAPSULE ORAL at 08:36

## 2020-04-25 RX ADMIN — EZETIMIBE SCH MG: 10 TABLET ORAL at 10:50

## 2020-04-25 RX ADMIN — SERTRALINE SCH MG: 50 TABLET, FILM COATED ORAL at 10:50

## 2020-04-25 RX ADMIN — ENOXAPARIN SODIUM SCH MG: 100 INJECTION SUBCUTANEOUS at 10:49

## 2020-04-25 NOTE — PROGRESS NOTE
Assessment and Plan





Acute on chronic HFrEF 25-30% (Dr. Anderson [facility])


   Compensated


   Plan for discharge home today


CAD/MI/PCI (most recent PCI to RCA on 6/13/2017 @ [facility])/recurrent chronic 

angina


Ischemic cardiomyopathy/ICD (cardioMEMS in situ), 


Hypertension 


Diabetes 


Chronic kidney disease 


Hypothyroidism 


   


   














Subjective


Date of service: 04/25/20


Principal diagnosis: Heart failure


Interval history: 





Patient sitting up in bed states that she feels much better





Objective


                                   Vital Signs











  Temp Pulse Resp BP Pulse Ox


 


 04/25/20 10:00   65   


 


 04/25/20 08:11  98.9 F  62  18  101/52  95


 


 04/25/20 06:00   63   


 


 04/25/20 04:25  98.8 F  61  18  107/48  96


 


 04/24/20 23:09  98.3 F  64  18  100/39  98


 


 04/24/20 22:00   56 L   


 


 04/24/20 20:28  98.0 F  54 L  18  95/41  97


 


 04/24/20 20:20      98


 


 04/24/20 16:12  98.0 F  57 L  18  97/49  96














- Physical Examination


General: No Apparent Distress


HEENT: Positive: PERRL, Normocephaly, Mucus Membranes Moist


Neck: Positive: neck supple, trachea midline


Cardiac: Positive: Reg Rate and Rhythm


Lungs: Positive: Normal Exam, clear to auscultation


Neuro: Positive: Grossly Intact


Abdomen: Positive: Unremarkable, Soft, Active Bowel Sounds.  Negative: Tender


Skin: Negative: Rash


Musculoskeletal: No Pain


Extremities: Absent: edema





- Labs and Meds


                          Comprehensive Metabolic Panel











  04/25/20 Range/Units





  07:13 


 


Sodium  136 L  (137-145)  mmol/L


 


Potassium  4.4  D  (3.6-5.0)  mmol/L


 


Chloride  95.2 L  ()  mmol/L


 


Carbon Dioxide  29  (22-30)  mmol/L


 


BUN  30 H  (7-17)  mg/dL


 


Creatinine  1.4 H  (0.7-1.2)  mg/dL


 


Glucose  421 H  ()  mg/dL


 


Calcium  9.1  (8.4-10.2)  mg/dL














- Imaging and Cardiology


EKG: report reviewed, image reviewed


Echo: report reviewed ([facility] 4/16/2020 showed EF 25-30%, grade III diastolic 

dysfunction, RV function mildly reduced. )


Cardiac cath: report reviewed (3/2018 showed stable 3-vessel CAD with patent 

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 

to intervene upon. )





- EKG


Sinus rhythms and dysrhythmias: sinus rhythm

## 2020-04-25 NOTE — DISCHARGE SUMMARY
Providers





- Providers


Date of Admission: 


04/22/20 22:06





Date of discharge: 04/25/20


Attending physician: 


AMY J KOCHERLA





                                        





04/22/20 22:05


Consult to Physician [CONS] Routine 


   Comment: 


   Consulting Provider: MARS MONROY


   Physician Instructions: 


   Reason For Exam: cp











Primary care physician: 


[facility], [place]








Hospitalization


Condition: Fair


Hospital course: 


--Atypical chest pain , noncardiac/costochondritis


advised over-the-counter pain medications


Cardiology has no plans of further work-up





--Acute on chronic systolic CHF, [id number]% 


continue anti-failure medications, input output monitoring


Low-sodium diet, fluid restriction, cardiology consulted





--Type II diabetes; uncontrolled


Accu-Chek sliding scale coverage ADA diet


Lantus twice daily dose, A1c more than 9





--Acute kidney injury; vasomotor nephropathy


Present on admission, now resolved





--Hypokalemia, replete potassium


Monitor electrolytes





--Mild hyponatremia;


Improvement since admission, closely monitor electrolytes





--Hypertension; blood pressures in the lower range


Monitor, resume antihypertensives as needed





--History of hypothyroidism;


Continue Synthroid





--DVT prophylaxis;


Lovenox





Monitor closely and adjust management as needed


Possible discharge home tomorrow if stable











Disposition: DC-01 TO HOME OR SELFCARE


Time spent for discharge: 32 min





Core Measure Documentation





- Palliative Care


Palliative Care/ Comfort Measures: Not Applicable





- Core Measures


Any of the following diagnoses?: none





Exam





- Constitutional


Vitals: 


                                        











Temp Pulse Resp BP Pulse Ox


 


 98.9 F   65   18   101/52   95 


 


 04/25/20 08:11  04/25/20 10:00  04/25/20 08:11  04/25/20 08:11  04/25/20 08:11











General appearance: Present: no acute distress, well-nourished, obese (Morbidly 

obese)





Plan


Activity: advance as tolerated


Diet: diabetic, other (cardiac diet)


Additional Instructions: Strongly advised to comply with medications diet and 

exercises and follow-up visits.  Low sodium diet.  Fluid restriction to less 

than 1200 mL per 24 hours.  If you have severe chest pain or shortness of breath

 contact MD or go to emergency room


Follow up with: 


[facility], MD [Primary Care Provider] - 7 Days


SAMSON COHEN MD [Staff Physician] - 7 Days


Forms:  Work/School Release Form


Prescriptions: 


Furosemide [Lasix TAB] 40 mg PO QDAY #30 tablet


Metoprolol Xl [Metoprolol SUCCINATE ER TAB] 100 mg PO QDAY #30 tablet

## 2020-12-23 ENCOUNTER — HOSPITAL ENCOUNTER (OUTPATIENT)
Dept: HOSPITAL 5 - ED | Age: 45
Setting detail: OBSERVATION
LOS: 2 days | Discharge: HOME HEALTH | End: 2020-12-25
Attending: INTERNAL MEDICINE | Admitting: INTERNAL MEDICINE
Payer: MEDICARE

## 2020-12-23 DIAGNOSIS — Z79.01: ICD-10-CM

## 2020-12-23 DIAGNOSIS — Z98.890: ICD-10-CM

## 2020-12-23 DIAGNOSIS — E11.22: ICD-10-CM

## 2020-12-23 DIAGNOSIS — R20.0: ICD-10-CM

## 2020-12-23 DIAGNOSIS — E03.9: ICD-10-CM

## 2020-12-23 DIAGNOSIS — I50.9: ICD-10-CM

## 2020-12-23 DIAGNOSIS — I13.0: ICD-10-CM

## 2020-12-23 DIAGNOSIS — I63.9: ICD-10-CM

## 2020-12-23 DIAGNOSIS — T82.867D: Primary | ICD-10-CM

## 2020-12-23 DIAGNOSIS — Z79.899: ICD-10-CM

## 2020-12-23 DIAGNOSIS — E11.65: ICD-10-CM

## 2020-12-23 DIAGNOSIS — G45.9: ICD-10-CM

## 2020-12-23 DIAGNOSIS — R94.31: ICD-10-CM

## 2020-12-23 DIAGNOSIS — K21.9: ICD-10-CM

## 2020-12-23 DIAGNOSIS — I20.0: ICD-10-CM

## 2020-12-23 DIAGNOSIS — N18.2: ICD-10-CM

## 2020-12-23 DIAGNOSIS — Z95.810: ICD-10-CM

## 2020-12-23 DIAGNOSIS — E66.2: ICD-10-CM

## 2020-12-23 DIAGNOSIS — Z79.82: ICD-10-CM

## 2020-12-23 DIAGNOSIS — Z95.1: ICD-10-CM

## 2020-12-23 DIAGNOSIS — R51.9: ICD-10-CM

## 2020-12-23 DIAGNOSIS — E78.5: ICD-10-CM

## 2020-12-23 DIAGNOSIS — E78.2: ICD-10-CM

## 2020-12-23 DIAGNOSIS — E11.40: ICD-10-CM

## 2020-12-23 DIAGNOSIS — Z79.4: ICD-10-CM

## 2020-12-23 DIAGNOSIS — R29.701: ICD-10-CM

## 2020-12-23 DIAGNOSIS — I25.5: ICD-10-CM

## 2020-12-23 DIAGNOSIS — E11.319: ICD-10-CM

## 2020-12-23 DIAGNOSIS — Z98.51: ICD-10-CM

## 2020-12-23 PROCEDURE — 84702 CHORIONIC GONADOTROPIN TEST: CPT

## 2020-12-23 PROCEDURE — 96374 THER/PROPH/DIAG INJ IV PUSH: CPT

## 2020-12-23 PROCEDURE — 82550 ASSAY OF CK (CPK): CPT

## 2020-12-23 PROCEDURE — 83036 HEMOGLOBIN GLYCOSYLATED A1C: CPT

## 2020-12-23 PROCEDURE — 93880 EXTRACRANIAL BILAT STUDY: CPT

## 2020-12-23 PROCEDURE — 99291 CRITICAL CARE FIRST HOUR: CPT

## 2020-12-23 PROCEDURE — 85730 THROMBOPLASTIN TIME PARTIAL: CPT

## 2020-12-23 PROCEDURE — 70496 CT ANGIOGRAPHY HEAD: CPT

## 2020-12-23 PROCEDURE — 80061 LIPID PANEL: CPT

## 2020-12-23 PROCEDURE — 70498 CT ANGIOGRAPHY NECK: CPT

## 2020-12-23 PROCEDURE — 70450 CT HEAD/BRAIN W/O DYE: CPT

## 2020-12-23 PROCEDURE — 97165 OT EVAL LOW COMPLEX 30 MIN: CPT

## 2020-12-23 PROCEDURE — 82962 GLUCOSE BLOOD TEST: CPT

## 2020-12-23 PROCEDURE — 85025 COMPLETE CBC W/AUTO DIFF WBC: CPT

## 2020-12-23 PROCEDURE — 80048 BASIC METABOLIC PNL TOTAL CA: CPT

## 2020-12-23 PROCEDURE — 71046 X-RAY EXAM CHEST 2 VIEWS: CPT

## 2020-12-23 PROCEDURE — 96376 TX/PRO/DX INJ SAME DRUG ADON: CPT

## 2020-12-23 PROCEDURE — 93306 TTE W/DOPPLER COMPLETE: CPT

## 2020-12-23 PROCEDURE — G0378 HOSPITAL OBSERVATION PER HR: HCPCS

## 2020-12-23 PROCEDURE — 93005 ELECTROCARDIOGRAM TRACING: CPT

## 2020-12-23 PROCEDURE — 85610 PROTHROMBIN TIME: CPT

## 2020-12-23 PROCEDURE — 84484 ASSAY OF TROPONIN QUANT: CPT

## 2020-12-23 PROCEDURE — 97161 PT EVAL LOW COMPLEX 20 MIN: CPT

## 2020-12-23 PROCEDURE — 96372 THER/PROPH/DIAG INJ SC/IM: CPT

## 2020-12-23 PROCEDURE — 36415 COLL VENOUS BLD VENIPUNCTURE: CPT

## 2020-12-23 PROCEDURE — 90686 IIV4 VACC NO PRSV 0.5 ML IM: CPT

## 2020-12-23 PROCEDURE — 96375 TX/PRO/DX INJ NEW DRUG ADDON: CPT

## 2020-12-23 PROCEDURE — 83880 ASSAY OF NATRIURETIC PEPTIDE: CPT

## 2020-12-23 NOTE — XRAY REPORT
XR chest routine 2V



INDICATION / CLINICAL INFORMATION: Chest Pain.



COMPARISON: 7/27/2020



FINDINGS:



SUPPORT DEVICES: Left sided cardiac conduction device and right chest wall port are stable.

HEART /PULMONARY VASCULATURE: No significant abnormality. 

LUNGS / PLEURA: No significant pulmonary or pleural abnormality. No pneumothorax. 



ADDITIONAL FINDINGS: No significant additional findings.



IMPRESSION:

1. No acute findings.



Signer Name: Siddhartha Yusuf MD 

Signed: 12/23/2020 2:36 PM

Workstation Name: [id number]

## 2020-12-23 NOTE — EMERGENCY DEPARTMENT REPORT
ED General Adult HPI





- General


Chief complaint: Chest Pain


Stated complaint: CHEST PAIN, SOB


PUI?: No


Time Seen by Provider: 20 21:34


Source: patient, RN notes reviewed, old records reviewed


Mode of arrival: Ambulatory


Limitations: No Limitations





- History of Present Illness


Initial comments: 





The patient was evaluated in the emergency department for symptoms described in 

the history of present illness.  He/she was evaluated in the context of the 

global COVID-19 pandemic, which necessitated consideration that the patient 

might be at risk for infection with the virus that causes COVID-19.  

Institutional protocols and algorithms that pertain to the evaluation of 

patients at risk for COVID-19 are in a state of rapid change based on 

information released by regulatory bodies including the CDC and federal and 

state organizations.  These policies and algorithms were followed during the 

patient's care in the emergency department.  Please note that these policies, 

procedures and recommendations changed on a rapid basis.





During the history and physical examination, I am chaperoned by nurse Minerva jones





The patient is a 45-year-old female.  I have evaluated her in the past.  She has

a history of ischemic heart disease, ischemic cardiomyopathy, ICD, chronic heart

failure, recurrent angina, renal insufficiency, hypertension, hypothyroidism and

diabetes.





At this hospital, she follows with [facility] heart, Dr. Ortez.





At [place], she follows with Jaxson Dupree





Recent echocardiogram at [facility] showed an EF of 25 to 30%, 2020.





Today, the patient presents to the ER with multiple complaints.  Her first 

complaint is abdominal swelling and cramping that moves up to her epigastric reg

ion, feeling like she is short of breath, and fluid overloaded.  She states that

she takes torsemide, metolazone, and spironolactone.  She reports she is 

compliant with her medications, denies dietary indiscretions.  She reports that 

she was seen at [facility] a few days ago, and told that she had elevated 

BNP, at approximately 3000, however, she reports that no medication 

recommendations or adjustments were made, and she was counseled/advised by the 

provider at the other facility to follow-up with her primary cardiologist.  She 

reports having called her cardiologist office recently, spoke to one of the 

nurses, and was told to come to the emergency room.





Her next complaint is chest pain.  Her chest pain is central and left-sided, and

moves to the back into the left arm.  There is no vomiting or diaphoresis.  

There is chronic shortness of breath.  There is chronic lower extremity swe

lling.  She denies DVT and pulmonary embolism risk factors, pregnancy, delivery,

travel, surgery, or oral contraceptive use.





Her next complaint is headache, and numbness.  Her headache is left-sided.  The 

headache started yesterday.  The headache is not sudden or thunderclap in 

nature.  The headache is not maximal in intensity.  The headache is not 

described as the worst headache of her life.  There is no loss of vision, no 

neck pain, no neck stiffness.  She also describes numbness in her left upper 

extremity, which developed 1 hour after waking up this morning, and right lower 

extremity numbness.  She is not quite sure how long her right lower extremity 

has been feeling numb for.


-: Gradual, hour(s), days(s)


Location: face, chest, left, right, upper extremity, lower extremity


Radiation: back


Severity scale (0 -10): 10


Quality: aching


Consistency: intermittent


Improves with: none


Worsens with: none





- Related Data


                                Home Medications











 Medication  Instructions  Recorded  Confirmed  Last Taken


 


Isosorbide Mononitrate [Isosorbide 120 mg PO DAILY 02/05/15 12/24/20 09/20/19





Mononitrate ER (IMDUR)]    


 


Levothyroxine [Synthroid] 100 mcg PO QDAY 17


 


Insulin Aspart (Nf) [NovoLOG 100 5 unit SQ AC 07/15/20 12/24/20 07/14/20





UNITS/ML VIAL]    


 


Insulin Detemir [Levemir VIAL] 35 unit SQ BID 07/15/20 12/24/20 07/14/20


 


Torsemide [Demadex] 100 mg PO QDAY 07/15/20 12/24/20 07/14/20


 


Zolpidem (Nf) [Ambien (Nf)] 10 mg PO QHS 07/15/20 12/24/20 07/14/20








                                  Previous Rx's











 Medication  Instructions  Recorded  Last Taken  Type


 


Sertraline [Zoloft] 50 mg PO QDAY #30 tablet 17 Rx


 


AtorvaSTATin [Lipitor] 80 mg PO QHS  tablet 17 Rx


 


Cholecalciferol Vit D3 [Vitamin D3 1,000 unit PO QDAY  tablet 08/30/17 09/15/19 

Rx





1,000 UNIT TAB]    


 


Clopidogrel [Plavix] 75 mg PO QDAY  tablet 17 Rx


 


Ezetimibe [Zetia] 10 mg PO QDAY  tablet 17 Rx


 


Ferrous Sulfate [Feosol 325 MG tab] 325 mg PO QDAY  tablet 17 Rx


 


Gabapentin 600 mg PO TID  capsule 17 Rx


 


Spironolactone [Aldactone] 50 mg PO QDAY  tablet 17 Rx


 


Metoprolol Xl [Metoprolol 100 mg PO QDAY #30 tablet 20 Unknown Rx





SUCCINATE ER TAB]    


 


Aspirin EC [Ecotrin] 325 mg PO QDAY  tablet 20 Unknown Rx


 


Insulin Glargine [Lantus VIAL] 35 units SUB-Q BID  units 20 Unknown Rx


 


Nitroglycerin [Nitrostat] 0.4 mg SL Q5M PRN  tablet 20 Unknown Rx


 


Nitrofurantoin Mono/M-Cryst 100 mg PO Q12HR #14 capsule 20 Unknown Rx





[Macrobid CAP]    


 


Potassium Chloride [K-Dur] 20 meq PO BID #10 tab 20 Unknown Rx











                                    Allergies











Allergy/AdvReac Type Severity Reaction Status Date / Time


 


adhesive tape Allergy  Rash Verified 20 14:02


 


bupivacaine Allergy  Angioedema Verified 20 14:02


 


ketorolac [From Toradol] Allergy  Hives Verified 20 14:02


 


Sulfa (Sulfonamide Allergy  Nausea Verified 10/12/17 11:13





Antibiotics)     


 


famotidine [From Pepcid] AdvReac  Vomiting Verified 20 14:02














ED Review of Systems


ROS: 


Stated complaint: CHEST PAIN, SOB


Other details as noted in HPI





Constitutional: malaise.  denies: fever


ENT: congestion


Respiratory: orthopnea, shortness of breath, SOB with exertion, SOB at rest.  

denies: cough, wheezing


Cardiovascular: chest pain, dyspnea on exertion, edema


Gastrointestinal: denies: abdominal pain, nausea, vomiting


Genitourinary: denies: dysuria


Musculoskeletal: arthralgia, myalgia


Skin: denies: lesions


Neurological: weakness, numbness


Psychiatric: anxiety





ED Past Medical Hx





- Past Medical History


Hx Hypertension: Yes (CHF EF 15-20%)


Hx Heart Attack/AMI: Yes (reports 9)


Hx Congestive Heart Failure: Yes


Hx Diabetes: Yes


Hx GERD: Yes


Hx Liver Disease: No


Hx Renal Disease: Yes (CKD, stage 2)


Hx Seizures: No


Hx Asthma: No


Hx COPD: No


Hx Tuberculosis: No


Hx HIV: No


Additional medical history: CAD, gatroparesis, hypOthyroidism, retinopathy, 

neuropathy, elevated cholesterol.  15 stents





- Surgical History


Hx Coronary Stent: Yes (15)


Hx Pacemaker: Yes


Hx Internal Defibrillator: Yes (AICD)


Additional Surgical History: tubal ligation, left fallopian tube removed, right 

knee surgery, right arm surgery, left eye surgeryPOWER PORT,Pacemaker  Right 

ring finger surgery.DexCom continueous glucose meter, right abd





- Social History


Smoking Status: Never Smoker


Substance Use Type: None





- Medications


Home Medications: 


                                Home Medications











 Medication  Instructions  Recorded  Confirmed  Last Taken  Type


 


Isosorbide Mononitrate [Isosorbide 120 mg PO DAILY 02/05/15 12/24/20 09/20/19 

History





Mononitrate ER (IMDUR)]     


 


Sertraline [Zoloft] 50 mg PO QDAY #30 tablet 17 Rx


 


Levothyroxine [Synthroid] 100 mcg PO QDAY 17 History


 


AtorvaSTATin [Lipitor] 80 mg PO QHS  tablet 17 Rx


 


Cholecalciferol Vit D3 [Vitamin D3 1,000 unit PO QDAY  tablet 08/30/17 12/24/20 

09/15/19 Rx





1,000 UNIT TAB]     


 


Clopidogrel [Plavix] 75 mg PO QDAY  tablet 17 Rx


 


Ezetimibe [Zetia] 10 mg PO QDAY  tablet 17 Rx


 


Ferrous Sulfate [Feosol 325 MG tab] 325 mg PO QDAY  tablet 17 Rx


 


Gabapentin 600 mg PO TID  capsule 17 Rx


 


Spironolactone [Aldactone] 50 mg PO QDAY  tablet 17 Rx


 


Metoprolol Xl [Metoprolol 100 mg PO QDAY #30 tablet 20 Unknown Rx





SUCCINATE ER TAB]     


 


Insulin Aspart (Nf) [NovoLOG 100 5 unit SQ AC 07/15/20 12/24/20 07/14/20 History





UNITS/ML VIAL]     


 


Insulin Detemir [Levemir VIAL] 35 unit SQ BID 07/15/20 12/24/20 07/14/20 History


 


Torsemide [Demadex] 100 mg PO QDAY 07/15/20 12/24/20 07/14/20 History


 


Zolpidem (Nf) [Ambien (Nf)] 10 mg PO QHS 07/15/20 12/24/20 07/14/20 History


 


Aspirin EC [Ecotrin] 325 mg PO QDAY  tablet 20 Unknown Rx


 


Insulin Glargine [Lantus VIAL] 35 units SUB-Q BID  units 20 

Unknown Rx


 


Nitroglycerin [Nitrostat] 0.4 mg SL Q5M PRN  tablet 20 Unknown Rx


 


Nitrofurantoin Mono/M-Cryst 100 mg PO Q12HR #14 capsule 20 

Unknown Rx





[Macrobid CAP]     


 


Potassium Chloride [K-Dur] 20 meq PO BID #10 tab 20 Unknown Rx














ED Physical Exam





- General


Limitations: No Limitations


General appearance: alert, anxious, obese





- Head


Head exam: Present: atraumatic, normocephalic





- Eye


Eye exam: Present: normal appearance, EOMI.  Absent: nystagmus





- ENT


ENT exam: Present: normal exam, normal orophraynx, mucous membranes moist, 

normal external ear exam





- Neck


Neck exam: Present: normal inspection, full ROM.  Absent: tenderness, 

meningismus





- Respiratory


Respiratory exam: Present: normal lung sounds bilaterally, decreased breath 

sounds.  Absent: respiratory distress, wheezes, rales, rhonchi, stridor





- Cardiovascular


Cardiovascular Exam: Present: regular rate, normal rhythm, normal heart sounds. 

Absent: bradycardia, tachycardia, irregular rhythm, systolic murmur, diastolic 

murmur, rubs, gallop





- GI/Abdominal


GI/Abdominal exam: Present: soft.  Absent: distended, tenderness, guarding, re

bound, rigid, pulsatile mass





- Extremities Exam


Extremities exam: Present: normal inspection, full ROM, pedal edema (2+ edema in

the bilateral lower extremities), other (2+ pulses noted in the bilateral upper 

and lower extremities.  There is no palpable cord.   negative Homans sign.  

Muscular compartments are soft.  The pelvis is stable.).  Absent: calf tende

rness





- Back Exam


Back exam: Present: normal inspection.  Absent: tenderness, CVA tenderness (R), 

paraspinal tenderness, vertebral tenderness





- Neurological Exam


Neurological exam: Present: alert (There is no past pointing.  There is no 

pronator drift.  There is normal heel-to-shin.), oriented X3, motor sensory 

deficit (There is decreased sensation to light touch in the right lower 

extremity, and left upper extremity.  There is 5 out of 5 strength in 4 

extremities.), other (There is no facial droop.  The tongue is midline.  The 

extraocular movements are intact bilaterally.  Hearing is intact bilaterally, 

tongue is midline, shoulder shrug is intact bilaterally.  Visual acuity intact 

to finger counting, color perception, and reading at a close distance.)





- Psychiatric


Psychiatric exam: Present: anxious





- Skin


Skin exam: Present: warm, dry, intact, normal color.  Absent: rash





ED Course


                                   Vital Signs











  20





  14:08 22:24 00:38


 


Temperature 97.5 F L  


 


Pulse Rate 67  


 


Respiratory 18 18 18





Rate   


 


Blood Pressure   


 


Blood Pressure 126/73  





[Right]   


 


O2 Sat by Pulse 98  





Oximetry   














  20





  00:39 02:55


 


Temperature  


 


Pulse Rate 76 


 


Respiratory  14





Rate  


 


Blood Pressure 111/76 


 


Blood Pressure  





[Right]  


 


O2 Sat by Pulse  





Oximetry  














- Reevaluation(s)


Reevaluation #1: 





20 21:38


[place]  [facility]





2020   1   2020   


OXYCODONE-ACETAMINOPHEN 5-325


15.0   4   DE MIL   [id number]   [facility] ([id number])   0   28.13 MME   [place]


12/15/2020   1   2020   


ZOLPIDEM TARTRATE 10 MG TABLET


30.0   30   JA EST   [id number]   [facility] ([id number])   2      [place]


2020   1   2020   


OXYCODONE-ACETAMINOPHEN 5-325


60.0   30   JE MATILDE   [id number]   [facility] ([id number])   0   15.0 MME   [place]


2020   1   2020   


ZOLPIDEM TARTRATE 10 MG TABLET


30.0   30   JE MATILDE   [id number]   [facility] ([id number])   0      [place]


2020   1   2020   


ZOLPIDEM TARTRATE 10 MG TABLET


30.0   30   JA EST   [id number]   [facility] ([id number])   1      [place]


2020   1   2020   


ZOLPIDEM TARTRATE 10 MG TABLET


30.0   30   JA EST   [id number]   [facility] ([id number])   0      [place]


2020   1   2020   


ZOLPIDEM TARTRATE 10 MG TABLET


30.0   30   LE BRITTANY   [id number]   [facility] ([id number])   2      [place]


06/10/2020   1   2020   


ZOLPIDEM TARTRATE 10 MG TABLET


30.0   30   LE BRITTANY   [id number]   [facility] ([id number])   1      [place]


2020   1   2020   


HYDROCODONE-ACETAMIN 5-325 MG


30.0   15   JE MATILDE   [id number]   [facility] ([id number])   0   10.0 MME   [place]


2020   1   2020   


ZOLPIDEM TARTRATE 10 MG TABLET


30.0   30   LE BRITTANY   [id number]   [facility] ([id number])   0      [place]


2020   1   2020   


ZOLPIDEM TARTRATE 10 MG TABLET


30.0   30   CA KRISTY   [id number]   [facility] ([id number])   1      [place]


2020   1   2020   


ZOLPIDEM TARTRATE 10 MG TABLET


30.0   30   [facility] KRISTY   [id number]   [facility] ([id number])   0      [place]


2020   1   2020   


ZOLPIDEM TARTRATE 10 MG TABLET


30.0   30   [facility] K[facility]ST[facility]   [id number]   [facility] ([id number])   0      [place]   [place]


*Pharmacy is created using a combination of pharmacy name and the last four 

digits of the pharmacy license number.


*Per CDC guidance, the MME conversion factors prescribed or provided as part of 

medication-assisted treatment for opioid use disorder should not be used to 

benchmark against dosage thresholds meant for opioids prescribed for pain. 

Buprenorphine products have no agreed upon morphine equivalency, and as partial 

opioid agonists, are not expected to be associated with overdose risk in the 

same dose-dependent manner as doses for full agonist opioids. MME = morphine 

milligram equivalents. mg = dose in milligrams.


 Prescribers


Name   Address   [facility]   State   Zip   Phone


SIRENA PHELPS MD   [address]   [facility]   [id number]   


JO CLAYTONNITHIN   [address] [place] [address]   [id number]   


EFRA TERAN PA-C   [address]   [facility]   [id number]   


KOFI LARES   [address] [facility]   [phone]   


GURU STEARNS   [address]   [facility]   [id number]   


Reevaluation #2: 





20 22:04


Differential diagnosis, including but not limited to: Congestive heart failure, 

stable angina, unstable angina, aortic dissection, pneumonia, subacute stroke


Migraine headache, tension headache, cluster headache








Assessment and plan: 45-year-old female with multiple complaints.





Complaint #1, shortness of breath and chest pain.  Patient not currently 

tachycardic, tachypneic or hypoxic, denies DVT and pulmonary embolism risk 

factors, low risk by Wells criteria, PERC negative.  Has lower extremity edema. 

 X-ray of the chest is clear, saturating well on room air.  There may be a 

component of right-sided heart failure, and mild fluid overload.  However, 

patient is at moderate risk for major adverse cardiac event as per heart score. 

 Also endorses inability to closely follow-up with her outpatient cardiologist. 

 Treat her pain, advised that we will withhold narcotics/opiates, given her 

history of neurologic symptoms.  Aortic disease is unlikely, given equal pulses 

in the upper and lower extremities, lack of severe hypertension, lack of


Pulsatile abdominal mass.


X-ray of the chest shows no pneumothorax or pneumonia.  However, anticipate 

admission to the medical service for cardiac risk ratification.





Complaint #2, headache, left arm numbness, right leg numbness.  GCS 15, NIH 

score of 1.  Presents more than 4.5 hours after symptom onset, therefore not a 

TPA candidate.  Given complaint of headache, chest pain, and new onset neurolog

ic symptoms, CT angiogram head and neck is ordered to exclude aortic dissection.

  Noncontrast CT scan of the brain will also be ordered.  Headache will be 

treated with Reglan, Benadryl.





We will obtain neurology consultation.





Have discussed this plan of care with the patient, who verbalized understanding,

 and is amenable to this plan of care.





20 03:06








Laboratory studies are reviewed and appreciated.  CT scan of the brain negative 

for acute findings.  CT angiogram head neck negative for acute findings.  

Patient feels improved.  Patient was given a migraine/headache cocktail.  She 

was also given insulin for hyperglycemia.  She will also be given aspirin.  She 

is amenable to admission/hospitalization.  Suspect that patient has right-sided 

heart failure, and dependent edema/CHF.





Neurology recommendations are reviewed and appreciated.





Hospital physician, Dr. PER Marina, To admit patient to the medical service for 

cardiac risk ratification, presumed subacute stroke therapy/work-up/evaluation, 

CHF and hyperglycemia.





ED Medical Decision Making





- Lab Data


Result diagrams: 


                                 20 23:48





                                 20 23:48








                                   Vital Signs











  20





  14:08


 


Temperature 97.5 F L


 


Pulse Rate 67


 


Respiratory 18





Rate 


 


Blood Pressure 126/73





[Right] 


 


O2 Sat by Pulse 98





Oximetry 














- EKG Data


-: EKG Interpreted by Me


EKG shows normal: sinus rhythm


Rate: normal





- EKG Data





20 22:03


EKG #1 today appears to be unchanged from prior EKG from 2020





Sinus rhythm, 66 bpm, normal axis,  ms, poor R wave progression, low 

voltage in the lateral leads.  The EKG is abnormal.  The EKG is not a STEMI.





- Radiology Data


Radiology results: pending, report reviewed, image reviewed





Print Report











Referring Physician: GABRIELLE LOZANO 


 


Patient Name: JULITA LOVE 


 


Patient ID: [id number] 


 


YOB: 1975 


 


Sex: Female 


 


Accession: [id number] 


 


Report Date: 2020 


 


Report Status: Finalized 


 


Findings


[facility] [address] [place] [address] 

XRay Report Signed Patient: JULITA LOVE MR#: [id number] : 1975

 Acct:[id number] Age/Sex: 45 / F ADM Date: 20 Loc: ED Attending Dr: 

Ordering Physician: GABRIELLE LOZANO MD Date of Service: 20 Procedure(s): XR chest 

routine 2V Accession Number(s): [id number] cc: GABRIELLE LOZANO MD Fluoro Time In Minutes: 

XR chest routine 2V INDICATION / CLINICAL INFORMATION: Chest Pain. COMPARISON: 

2020 FINDINGS: SUPPORT DEVICES: Left sided cardiac conduction device and 

right chest wall port are stable. HEART /PULMONARY VASCULATURE: No significant 

abnormality. LUNGS / PLEURA: No significant pulmonary or pleural abnormality. No

 pneumothorax. ADDITIONAL FINDINGS: No significant additional findings. 

IMPRESSION: 1. No acute findings. Signer Name: Walter Yusuf MD Signed: 

2020 2:36 PM Workstation Name: [id number] Transcribed By: JS Dictated 

By: WALTER YUSUF MD Electronically Authenticated By: WALTER YUSUF MD Signed Date/Time: 20 DD/DT: 20   








CTA NECK WITH CONTRAST 2020  INDICATION / CLINICAL INFORMATION: Patient 

complains of a headache, LT arm numbness, RT leg numbness.   COMPARISON: None.  

TECHNIQUE: Routine CTA of the neck is performed. 3-D/MIP reformats were 

postprocessed. Percentage stenosis is determined by direct quantitative 

measurements of diseased internal carotid artery diameter compared with normal 

distal internal carotid artery reference segments or by criteria similar to 

NASCET where applicable. All CT scans at this location are performed using CT 

dose reduction for ALARA by means of automated exposure control.  CONTRAST: 100 

ml of Isovue 370  FINDINGS:  Carotid bifurcations: Moderate narrowing of the 

left proximal ICA, approximately 50%. Right bifurcation is unremarkable  Carotid

 arteries: No significant abnormality.  Cervical vertebral arteries: No 

significant abnormality.  Aortic arch: No significant abnormality.   None.  

IMPRESSION: 2% narrowing at the origin of the left ICA. Otherwise unremarkable  

Signer Name: Javi Coker MD Signed: 2020 1:47 AM Workstation Name: [id number]

[id number]





CTA HEAD WITH CONTRAST  HISTORY:  COMPARISON: None.  TECHNIQUE: All CT scans at 

this location are performed using CT dose reduction for ALARA by means of auto

mated exposure control.. 3-D/MIP reformats postprocessed. Percentage stenosis is

 determined by direct quantitative measurements of diseased internal carotid 

artery diameter compared with normal distal internal carotid artery reference 

segments or by criteria similar to NASCET where applicable.  CONTRAST: 100 ml of

 Isovue 370  FINDINGS:  CTA HEAD: Intracranial vertebral arteries: No 

significant abnormality. Basilar artery: No significant abnormality. Posterior 

cerebral arteries: No significant abnormality.  Intracranial internal carotid 

arteries: Mild atherosclerotic calcification associated with the distal ICAs at 

the level of the cavernous sinuses bilaterally, with no evidence of significant 

stenosis or occlusion. Anterior cerebral arteries: No significant abnormality. 

Middle cerebral arteries: No significant abnormality.  Dural venous sinuses:Not 

optimally opacified. No significant abnormality.  Additional findings: None.  

IMPRESSION: 1. No significant abnormality.  Signer Name: Javi Coker MD Signed: 

2020 1:51 AM Workstation Name: [id number]





CT BRAIN: 2020  INDICATION / CLINICAL INFORMATION: Patient complains of a 

headache, LT arm numbness, RT leg numbness.  COMPARISON: CT brain 2020. CT 

brain 2020.  FINDINGS:  BRAIN/INTRACRANIAL STRUCTURES: Unenhanced CT images

 of the brain were obtained and compared to prior exams. There is been no 

change. There is no evidence of acute intracranial abnormality.  Ventricles and 

sulci are within normal limits of size and shape for a patient of this age.  

There is no evidence of ischemic injury, hemorrhage, or mass. There are no 

abnormal extra-axial fluid collections.   EXTRACRANIAL STRUCTURES: Unremarkable.

    IMPRESSION: Negative exam. No change when compared to prior exams.     All 

CT scans at this location are performed using dose reduction to ALARA by means 

of automated exposure control.  Signer Name: Javi Coker MD Signed: 2020 

1:53 AM Workstation Name: [id number]


Critical Care Time: Yes


Critical care time in (mins) excluding proc time.: 35


Critical care attestation.: 


If time is entered above; I have spent that time in minutes in the direct care 

of this critically ill patient, excluding procedure time.








ED Disposition


Clinical Impression: 


 Acute chest pain, Acute headache, Left arm numbness, Right leg numbness, 

Abnormal EKG, Shortness of breath, Congestive heart failure, CVA (cerebral 

infarction), Hyperglycemia





Disposition:  OP ADMIT IP TO THIS HOSP


Is pt being admited?: Yes


Does the pt Need Aspirin: No


Condition: Good





Heart Score





- HEART Score


History: Moderately suspicious


EKG: Non-specific


Age: 45-65


Risk factors: > 3 risk factors or hx of atherosclerotic disease


Troponin: < normal limit


HEART Score: 5





- Critical Actions


Critical Actions: 4-6 pts:12-16.6% risk of adverse cardiac event. Should be 

admitted





- Assessment


Assessment Interval: Baseline





- Level of Consciousness


1a. Level of Consciousness: alert/keenly responsive





- LOC Questions


1b. LOC Questions: answers both correctly





- LOC Command


1c. LOC Commands: performs tasks correctly





- Best Gaze


2. Best Gaze: normal





- Visual


3. Visual: no visual loss





- Facial Palsy


4. Facial Palsy: normal symmetrical movement





- Motor Arm


5a. Motor Arm Left: no drift


5b. Motor Arm Right: no drift





- Motor Leg


6a. Motor Leg Left: no drift


6b. Motor Leg Right: no drift





- Limb Ataxia


7. Limb Ataxia: absent





- Sensory


8. Sensory: mild/moderate sensory loss





- Best Language


9. Best Language: no aphasia





- Dysarthria


10. Dysarthria: normal





- Extinction and Inattention


11. Extinction/Inattention: no abnormality





- Scoring


Total Score: 1


Stroke Severity: Minor Stroke

## 2020-12-24 LAB
APTT BLD: 29.1 SEC. (ref 24.2–36.6)
BASOPHILS # (AUTO): 0 K/MM3 (ref 0–0.1)
BASOPHILS # (AUTO): 0.1 K/MM3 (ref 0–0.1)
BASOPHILS NFR BLD AUTO: 0.7 % (ref 0–1.8)
BASOPHILS NFR BLD AUTO: 1.2 % (ref 0–1.8)
BUN SERPL-MCNC: 26 MG/DL (ref 7–17)
BUN SERPL-MCNC: 26 MG/DL (ref 7–17)
BUN/CREAT SERPL: 22 %
BUN/CREAT SERPL: 22 %
CALCIUM SERPL-MCNC: 8.6 MG/DL (ref 8.4–10.2)
CALCIUM SERPL-MCNC: 8.9 MG/DL (ref 8.4–10.2)
EOSINOPHIL # BLD AUTO: 0.1 K/MM3 (ref 0–0.4)
EOSINOPHIL # BLD AUTO: 0.2 K/MM3 (ref 0–0.4)
EOSINOPHIL NFR BLD AUTO: 2.3 % (ref 0–4.3)
EOSINOPHIL NFR BLD AUTO: 2.6 % (ref 0–4.3)
HCT VFR BLD CALC: 38.3 % (ref 30.3–42.9)
HCT VFR BLD CALC: 40.2 % (ref 30.3–42.9)
HDLC SERPL-MCNC: 39 MG/DL (ref 40–59)
HEMOLYSIS INDEX: 13
HEMOLYSIS INDEX: 6
HGB BLD-MCNC: 12.8 GM/DL (ref 10.1–14.3)
HGB BLD-MCNC: 13.2 GM/DL (ref 10.1–14.3)
INR PPP: 0.99 (ref 0.87–1.13)
LYMPHOCYTES # BLD AUTO: 1.4 K/MM3 (ref 1.2–5.4)
LYMPHOCYTES # BLD AUTO: 1.6 K/MM3 (ref 1.2–5.4)
LYMPHOCYTES NFR BLD AUTO: 26.4 % (ref 13.4–35)
LYMPHOCYTES NFR BLD AUTO: 27.9 % (ref 13.4–35)
MCHC RBC AUTO-ENTMCNC: 33 % (ref 30–34)
MCHC RBC AUTO-ENTMCNC: 34 % (ref 30–34)
MCV RBC AUTO: 100 FL (ref 79–97)
MCV RBC AUTO: 98 FL (ref 79–97)
MONOCYTES # (AUTO): 0.5 K/MM3 (ref 0–0.8)
MONOCYTES # (AUTO): 0.6 K/MM3 (ref 0–0.8)
MONOCYTES % (AUTO): 10.1 % (ref 0–7.3)
MONOCYTES % (AUTO): 8.7 % (ref 0–7.3)
PLATELET # BLD: 178 K/MM3 (ref 140–440)
PLATELET # BLD: 197 K/MM3 (ref 140–440)
RBC # BLD AUTO: 3.93 M/MM3 (ref 3.65–5.03)
RBC # BLD AUTO: 4.03 M/MM3 (ref 3.65–5.03)

## 2020-12-24 RX ADMIN — INSULIN GLARGINE SCH UNITS: 100 INJECTION, SOLUTION SUBCUTANEOUS at 23:49

## 2020-12-24 RX ADMIN — SERTRALINE SCH MG: 50 TABLET, FILM COATED ORAL at 12:19

## 2020-12-24 RX ADMIN — FUROSEMIDE SCH MG: 10 INJECTION, SOLUTION INTRAVENOUS at 06:16

## 2020-12-24 RX ADMIN — FUROSEMIDE SCH MG: 10 INJECTION, SOLUTION INTRAVENOUS at 18:35

## 2020-12-24 RX ADMIN — MORPHINE SULFATE PRN MG: 2 INJECTION, SOLUTION INTRAMUSCULAR; INTRAVENOUS at 16:48

## 2020-12-24 RX ADMIN — SPIRONOLACTONE SCH MG: 50 TABLET ORAL at 12:19

## 2020-12-24 RX ADMIN — ENOXAPARIN SODIUM SCH MG: 150 INJECTION SUBCUTANEOUS at 18:34

## 2020-12-24 RX ADMIN — INSULIN LISPRO SCH UNIT: 100 INJECTION, SOLUTION INTRAVENOUS; SUBCUTANEOUS at 18:35

## 2020-12-24 RX ADMIN — ENOXAPARIN SODIUM SCH MG: 150 INJECTION SUBCUTANEOUS at 23:47

## 2020-12-24 RX ADMIN — GABAPENTIN SCH MG: 300 CAPSULE ORAL at 20:24

## 2020-12-24 RX ADMIN — INSULIN LISPRO SCH: 100 INJECTION, SOLUTION INTRAVENOUS; SUBCUTANEOUS at 14:16

## 2020-12-24 RX ADMIN — CLOPIDOGREL BISULFATE SCH MG: 75 TABLET ORAL at 12:19

## 2020-12-24 RX ADMIN — INSULIN LISPRO SCH UNIT: 100 INJECTION, SOLUTION INTRAVENOUS; SUBCUTANEOUS at 23:48

## 2020-12-24 RX ADMIN — INSULIN LISPRO SCH UNIT: 100 INJECTION, SOLUTION INTRAVENOUS; SUBCUTANEOUS at 12:31

## 2020-12-24 RX ADMIN — MORPHINE SULFATE PRN MG: 2 INJECTION, SOLUTION INTRAMUSCULAR; INTRAVENOUS at 20:24

## 2020-12-24 RX ADMIN — LOSARTAN POTASSIUM SCH MG: 25 TABLET, FILM COATED ORAL at 12:19

## 2020-12-24 NOTE — CAT SCAN REPORT
CTA NECK WITH CONTRAST 12/24/2020



INDICATION / CLINICAL INFORMATION:

Patient complains of a headache, LT arm numbness, RT leg numbness.





COMPARISON: None.



TECHNIQUE: Routine CTA of the neck is performed. 3-D/MIP reformats were postprocessed.  Percentage st
enosis is determined by direct quantitative measurements of diseased internal carotid artery diameter
 compared with normal distal internal carotid artery reference segments or by criteria similar to OSCAR
CET where applicable. All CT scans at this location are performed using CT dose reduction for ALARA b
y means of automated exposure control.



CONTRAST: 100 ml of Isovue 370 



FINDINGS:



Carotid bifurcations: Moderate narrowing of the left proximal ICA, approximately 50%. Right bifurcati
on is unremarkable



Carotid arteries: No significant abnormality.



Cervical vertebral arteries: No significant abnormality.



Aortic arch: No significant abnormality.





None. 



IMPRESSION:

2% narrowing at the origin of the left ICA. Otherwise unremarkable



Signer Name: Javi Coker MD 

Signed: 12/24/2020 2:47 AM

Workstation Name: [id number]

## 2020-12-24 NOTE — HISTORY AND PHYSICAL REPORT
History of Present Illness


Date of examination: 12/24/20


Date of admission: 


12/24/20 03:08





Chief complaint: 





Shortness of breath


Chest pain


Headache


Left arm numbness


History of present illness: 





45-year-old white female with known history of coronary artery disease with 

stent placement in the past, CHF with ejection fraction of 50 to 20%, diabetes 

mellitus and hyperlipidemia presenting to the emergency room today with multiple

complaints including shortness of breath, headache, chest pain and left arm 

numbness.


Patient states she was seen at [facility] few days ago and was informed that

she has elevated BNP of about 3000 she was subsequently encouraged to follow-up 

with a cardiologist.  Patient follows up with Dr. Garsia at [facility].


She also indicates that her most recent echocardiogram done about a year ago was

about 20%.





Chest pain is said to be left-sided and radiating towards the left upper 

extremity.  No no relieving or exacerbating factor.


Patient's headache is also said to be left sided.  Denies any neck pain or neck 

stiffness, denies any blurry vision.








Work-up in the emergency room today reveals hyperglycemia with blood sugar in 

the 400s.  BNP was also elevated.


CT scan of the head and chest x-ray were unremarkable.





Patient admitted for work-up of hypoglycemia, chest pain left arm numbness and 

CHF.





Past History


Past Medical History: CAD (15 stents in the past), diabetes, GERD, heart 

failure, hypertension, hyperlipidemia, hypothyroidism, renal failure, other 

(Retinopathy, neuropathy)


Past Surgical History: Other (Pacemaker placement, tubal ligation, right knee 

surgery, right arm surgery, left eye surgery, port placement, cardiac stent 

placement )


Social history: no significant social history





Medications and Allergies


                                    Allergies











Allergy/AdvReac Type Severity Reaction Status Date / Time


 


adhesive tape Allergy  Rash Verified 12/23/20 14:02


 


bupivacaine Allergy  Angioedema Verified 12/23/20 14:02


 


ketorolac [From Toradol] Allergy  Hives Verified 12/23/20 14:02


 


Sulfa (Sulfonamide Allergy  Nausea Verified 10/12/17 11:13





Antibiotics)     


 


famotidine [From Pepcid] AdvReac  Vomiting Verified 12/23/20 14:02











                                Home Medications











 Medication  Instructions  Recorded  Confirmed  Last Taken  Type


 


Isosorbide Mononitrate [Isosorbide 120 mg PO DAILY 02/05/15 12/24/20 09/20/19 

History





Mononitrate ER (IMDUR)]     


 


Sertraline [Zoloft] 50 mg PO QDAY #30 tablet 04/27/17 12/24/20 09/20/19 Rx


 


Levothyroxine [Synthroid] 100 mcg PO QDAY 06/22/17 12/24/20 09/20/19 History


 


AtorvaSTATin [Lipitor] 80 mg PO QHS  tablet 08/30/17 12/24/20 09/20/19 Rx


 


Cholecalciferol Vit D3 [Vitamin D3 1,000 unit PO QDAY  tablet 08/30/17 12/24/20 

09/15/19 Rx





1,000 UNIT TAB]     


 


Clopidogrel [Plavix] 75 mg PO QDAY  tablet 08/30/17 12/24/20 09/20/19 Rx


 


Ezetimibe [Zetia] 10 mg PO QDAY  tablet 08/30/17 12/24/20 09/20/19 Rx


 


Ferrous Sulfate [Feosol 325 MG tab] 325 mg PO QDAY  tablet 08/30/17 12/24/20 09/20/19 Rx


 


Gabapentin 600 mg PO TID  capsule 08/30/17 12/24/20 09/20/19 Rx


 


Spironolactone [Aldactone] 50 mg PO QDAY  tablet 08/30/17 12/24/20 09/20/19 Rx


 


Metoprolol Xl [Metoprolol 100 mg PO QDAY #30 tablet 04/25/20 12/24/20 Unknown Rx





SUCCINATE ER TAB]     


 


Insulin Aspart (Nf) [NovoLOG 100 5 unit SQ AC 07/15/20 12/24/20 07/14/20 History





UNITS/ML VIAL]     


 


Insulin Detemir [Levemir VIAL] 35 unit SQ BID 07/15/20 12/24/20 07/14/20 History


 


Torsemide [Demadex] 100 mg PO QDAY 07/15/20 12/24/20 07/14/20 History


 


Zolpidem (Nf) [Ambien (Nf)] 10 mg PO QHS 07/15/20 12/24/20 07/14/20 History


 


Aspirin EC [Ecotrin] 325 mg PO QDAY  tablet 07/16/20 12/24/20 Unknown Rx


 


Insulin Glargine [Lantus VIAL] 35 units SUB-Q BID  units 07/16/20 12/24/20 

Unknown Rx


 


Nitroglycerin [Nitrostat] 0.4 mg SL Q5M PRN  tablet 07/16/20 12/24/20 Unknown Rx


 


Nitrofurantoin Mono/M-Cryst 100 mg PO Q12HR #14 capsule 07/27/20 12/24/20 

Unknown Rx





[Macrobid CAP]     


 


Potassium Chloride [K-Dur] 20 meq PO BID #10 tab 07/27/20 12/24/20 Unknown Rx











Active Meds: 


Active Medications





Nitroglycerin (Nitroglycerin 0.4 Mg Tab Subl)  0.4 mg SL .Q5MIN PRN


   PRN Reason: Chest Pain


   Last Admin: 12/24/20 00:39 Dose:  0.4 mg


   Documented by: 











Review of Systems


Constitutional: no fever, no chills


Ears, nose, mouth and throat: no nasal congestion, no sore throat


Cardiovascular: chest pain, no palpitations


Respiratory: shortness of breath, no cough


Gastrointestinal: no abdominal pain, no nausea, no vomiting, no diarrhea


Genitourinary Female: no pelvic pain, no flank pain, no dysuria, no hematuria


Musculoskeletal: no neck pain, no low back pain


Integumentary: no rash, no pruritis


Neurological: numbness (Left arm), headaches, no confusion


Psychiatric: no anxiety, no depression





Exam





- Constitutional


Vitals: 


                                        











Temp Pulse Resp BP Pulse Ox


 


 97.5 F L  76   14   111/76   98 


 


 12/23/20 14:08  12/24/20 00:39  12/24/20 02:55  12/24/20 00:39  12/23/20 14:08











General appearance: Present: no acute distress, well-nourished, obese





- EENT


Eyes: Present: PERRL, EOM intact.  Absent: scleral icterus


ENT: hearing intact, clear oral mucosa, dentition normal





- Neck


Neck: Present: supple, normal ROM





- Respiratory


Respiratory effort: normal


Respiratory: bilateral: CTA





- Cardiovascular


Rhythm: other (Pacemaker in place)


Heart Sounds: Present: S1 & S2.  Absent: gallop, systolic murmur, diastolic 

murmur, rub





- Extremities


Extremities: no ischemia, pulses intact, pulses symmetrical, No edema, Full ROM


Peripheral Pulses: within normal limits





- Abdominal


General gastrointestinal: Present: soft, non-tender, non-distended, normal bowel

sounds.  Absent: mass





- Integumentary


Integumentary: Present: clear, warm, dry.  Absent: rash





- Musculoskeletal


Musculoskeletal: strength equal bilaterally





- Psychiatric


Psychiatric: appropriate mood/affect, intact judgment & insight, memory intact, 

cooperative





- Neurologic


Neurologic: CNII-XII intact, no focal deficits, moves all extremities





HEART Score





- HEART Score


EKG: Non-specific


Age: 45-65


Risk factors: > 3 risk factors or hx of atherosclerotic disease


Troponin: 


                                        











Troponin T  < 0.010 ng/mL (0.00-0.029)   12/23/20  23:48    


 


Troponin T  < 0.010 ng/mL (0.00-0.029)   12/23/20  23:48    











Troponin: < normal limit





- Critical Actions


Critical Actions: 4-6 pts:12-16.6% risk of adverse cardiac event. Should be 

admitted





Results





- Labs


CBC & Chem 7: 


                                 12/24/20 06:45





                                 12/23/20 23:48


Labs: 


                              Abnormal lab results











  12/23/20 12/23/20 12/23/20 Range/Units





  23:48 23:48 23:48 


 


MCV  100 H    (79-97)  fl


 


MCH  33 H    (28-32)  pg


 


RDW  15.7 H    (13.2-15.2)  %


 


Mono % (Auto)  8.7 H    (0.0-7.3)  %


 


Sodium   135 L   (137-145)  mmol/L


 


Chloride   96.3 L   ()  mmol/L


 


BUN   26 H   (7-17)  mg/dL


 


Glucose   477 H   ()  mg/dL


 


POC Glucose     ()  mg/dL


 


NT-Pro-B Natriuret Pep    2372 H  (0-450)  pg/mL














  12/24/20 Range/Units





  01:27 


 


MCV   (79-97)  fl


 


MCH   (28-32)  pg


 


RDW   (13.2-15.2)  %


 


Mono % (Auto)   (0.0-7.3)  %


 


Sodium   (137-145)  mmol/L


 


Chloride   ()  mmol/L


 


BUN   (7-17)  mg/dL


 


Glucose   ()  mg/dL


 


POC Glucose  440 H  ()  mg/dL


 


NT-Pro-B Natriuret Pep   (0-450)  pg/mL














Assessment and Plan





- Patient Problems


(1) Acute chest pain


Current Visit: Yes   Status: Acute   


Plan to address problem: 


Patient admitted and placed on telemetry.  Will check serial cardiac enzymes.  

Patient placed on aspirin, sublingual nitroglycerin and IV morphine as needed 

for chest pain.


We will place consult to cardiology for evaluation.








(2) Left arm numbness


Current Visit: Yes   Status: Acute   


Plan to address problem: 


Etiology unclear.  Patient will be worked up for possible CVA.  We will schedule

for carotid Doppler and MRI of the brain.


We will also place consult to neurology for evaluation.








(3) CHF (congestive heart failure)


Current Visit: Yes   Status: Chronic   


Plan to address problem: 


Patient placed on diuretics.  Will monitor inputs and outputs and also monitor 

daily weight.


We will request cardiology follow-up.








(4) Hyperglycemia


Current Visit: Yes   Status: Chronic   


Plan to address problem: 


We will monitor Accu-Cheks closely








(5) DVT prophylaxis


Current Visit: No   Status: Acute   


Plan to address problem: 


Patient placed on subcutaneous Lovenox.








(6) Full code status


Current Visit: No   Status: Acute

## 2020-12-24 NOTE — VASCULAR LAB REPORT
CLINICAL DATA:



stroke



TECHNICAL DATA:



Imaging was performed from the base of the neck to the skull base using duplex sonography and color-f
low imaging with emphasis on the carotid and vertebral arterial systems.



RIGHT CAROTID ARTERY:



The right internal carotid artery, right external carotid, and right common carotid artery all well i
sirena and patent. Mild atherosclerotic plaque present at the carotid bifurcation.



Right common carotid artery peak systolic velocity  63 cm/sec

Right internal carotid artery peak systolic velocity    86 cm/sec

Right internal carotid artery end diastolic velocity    35cm/sec

Right internal carotid artery/right common carotid artery ratio  1.2

Vertebral artery flow is antegrade.



LEFT CAROTID ARTERY



The left internal carotid artery, left external carotid, and left common carotid artery all well imag
ed and patent. Mild atherosclerotic plaque present at the carotid bifurcation.



Left common carotid artery peak systolic velocity  82 cm/sec

Left internal carotid artery peak systolic velocity   85 cm/sec 

Left internal carotid artery end diastolic velocity   30 cm/sec

Left internal carotid artery/right common carotid artery ratio  1



Normal antegrade flow of the vertebral arteries.





IMPRESSION:



1. Sonographic NASCET Index

This study proposed the incorporation of distal ICA flow velocity information on the conventional car
otid Doppler study improving the diagnostic accuracy of PSV 1.

Right and left internal carotid arteries demonstrate 16-49% stenosis: 

*  pansystolic spectral broadening with a PSV <125 cm/s

.

2. Less than 50% stenosis common carotid arteries.

3. Less than 50% stenosis external carotid arteries.

4. Antegrade flow both vertebral arteries.







Signer Name: Morales Gilliland MD 

Signed: 12/24/2020 1:01 PM

Workstation Name: [id number]

## 2020-12-24 NOTE — CAT SCAN REPORT
CTA HEAD WITH CONTRAST



HISTORY:



COMPARISON: None.



TECHNIQUE: All CT scans at this location are performed using CT dose reduction for ALARA by means of 
automated exposure control.. 3-D/MIP reformats postprocessed.  Percentage stenosis is determined by d
irect quantitative measurements of diseased internal carotid artery diameter compared with normal dis
destin internal carotid artery reference segments or by criteria similar to NASCET where applicable.



CONTRAST: 100 ml of Isovue 370



FINDINGS:



CTA HEAD:

Intracranial vertebral arteries: No significant abnormality.

Basilar artery: No significant abnormality.

Posterior cerebral arteries: No significant abnormality.



Intracranial internal carotid arteries: Mild atherosclerotic calcification associated with the distal
 ICAs at the level of the cavernous sinuses bilaterally, with no evidence of significant stenosis or 
occlusion.

Anterior cerebral arteries: No significant abnormality.

Middle cerebral arteries: No significant abnormality.



Dural venous sinuses:Not optimally opacified. No significant abnormality.



Additional findings: None. 



IMPRESSION:

1. No significant abnormality.



Signer Name: Javi Coker MD 

Signed: 12/24/2020 2:51 AM

Workstation Name: [id number]

## 2020-12-24 NOTE — CONSULTATION
History of Present Illness


Consult date: 12/24/20


Requesting physician: GUCCI HALE


Consult reason: congestive heart failure


History of present illness: 


45-year-old female with morbid obesity diabetes hypertension hyperlipidemia 

frequent admissions to multiple hospital systems for chest pain congestive heart

failure patient states compliant with medication.  Was in [facility] last month chest pain.  Patient's had multiple catheterizations and 

stress test.  Patient has pain stents with small vessel disease and severe LV 

dysfunction EF 25% functioning AICD.  Patient presents here stating that her 

fluid overload patient states compliance with medication and diet.  BNP is 3000 

here receiving IV Lasix patient is able lie down flat.  Echocardiogram done at 

bedside shows severe LV dysfunction but echodensity on the RV lead suggestive of

possible thrombus.  Patient chest pain is atypical in nature with negative 

troponin.  Has had multiple stress test and cardiac catheterizations.


Cardiac catheterization June 2019 at [facility] patent RCA LAD and 

circumflex stents distal vessels diffusely tapered off with small vessel 

disease.  The RCA shows multiple patent stents from the ostium extending to the 

bifurcation distal RCA PDA PLV are small vessel disease severe diffuse disease 

EF 15 to 20%.  Similar findings on previous catheterizations in 2018 in 2017


Cardiac PET in May 2020 shows no significant ischemia severe fixed defects in 

anterior inferior and septal region








Past History


Past Medical History: CAD (15 stents in the past), diabetes, GERD, heart 

failure, hypertension, hyperlipidemia, hypothyroidism, renal failure, other 

(Retinopathy, neuropathy)


Past Surgical History: Other (Pacemaker placement, tubal ligation, right knee 

surgery, right arm surgery, left eye surgery, port placement, cardiac stent 

placement )


Social history: no significant social history





Medications and Allergies


                                    Allergies











Allergy/AdvReac Type Severity Reaction Status Date / Time


 


adhesive tape Allergy  Rash Verified 12/23/20 14:02


 


bupivacaine Allergy  Angioedema Verified 12/23/20 14:02


 


ketorolac [From Toradol] Allergy  Hives Verified 12/23/20 14:02


 


Sulfa (Sulfonamide Allergy  Nausea Verified 10/12/17 11:13





Antibiotics)     


 


famotidine [From Pepcid] AdvReac  Vomiting Verified 12/23/20 14:02











                                Home Medications











 Medication  Instructions  Recorded  Confirmed  Last Taken  Type


 


Isosorbide Mononitrate [Isosorbide 120 mg PO DAILY 02/05/15 12/24/20 09/20/19 

History





Mononitrate ER (IMDUR)]     


 


Sertraline [Zoloft] 50 mg PO QDAY #30 tablet 04/27/17 12/24/20 09/20/19 Rx


 


Levothyroxine [Synthroid] 100 mcg PO QDAY 06/22/17 12/24/20 09/20/19 History


 


AtorvaSTATin [Lipitor] 80 mg PO QHS  tablet 08/30/17 12/24/20 09/20/19 Rx


 


Cholecalciferol Vit D3 [Vitamin D3 1,000 unit PO QDAY  tablet 08/30/17 12/24/20 

09/15/19 Rx





1,000 UNIT TAB]     


 


Clopidogrel [Plavix] 75 mg PO QDAY  tablet 08/30/17 12/24/20 09/20/19 Rx


 


Ezetimibe [Zetia] 10 mg PO QDAY  tablet 08/30/17 12/24/20 09/20/19 Rx


 


Ferrous Sulfate [Feosol 325 MG tab] 325 mg PO QDAY  tablet 08/30/17 12/24/20 09/20/19 Rx


 


Gabapentin 600 mg PO TID  capsule 08/30/17 12/24/20 09/20/19 Rx


 


Spironolactone [Aldactone] 50 mg PO QDAY  tablet 08/30/17 12/24/20 09/20/19 Rx


 


Metoprolol Xl [Metoprolol 100 mg PO QDAY #30 tablet 04/25/20 12/24/20 Unknown Rx





SUCCINATE ER TAB]     


 


Insulin Aspart (Nf) [NovoLOG 100 5 unit SQ AC 07/15/20 12/24/20 07/14/20 History





UNITS/ML VIAL]     


 


Insulin Detemir [Levemir VIAL] 35 unit SQ BID 07/15/20 12/24/20 07/14/20 History


 


Torsemide [Demadex] 100 mg PO QDAY 07/15/20 12/24/20 07/14/20 History


 


Zolpidem (Nf) [Ambien (Nf)] 10 mg PO QHS 07/15/20 12/24/20 07/14/20 History


 


Aspirin EC [Ecotrin] 325 mg PO QDAY  tablet 07/16/20 12/24/20 Unknown Rx


 


Insulin Glargine [Lantus VIAL] 35 units SUB-Q BID  units 07/16/20 12/24/20 

Unknown Rx


 


Nitroglycerin [Nitrostat] 0.4 mg SL Q5M PRN  tablet 07/16/20 12/24/20 Unknown Rx


 


Nitrofurantoin Mono/M-Cryst 100 mg PO Q12HR #14 capsule 07/27/20 12/24/20 

Unknown Rx





[Macrobid CAP]     


 


Potassium Chloride [K-Dur] 20 meq PO BID #10 tab 07/27/20 12/24/20 Unknown Rx











Active Meds: 


Active Medications





Acetaminophen (Acetaminophen 325 Mg Tab)  650 mg PO Q4H PRN


   PRN Reason: Pain MILD(1-3)/Fever >100.5/HA


Atorvastatin Calcium (Atorvastatin 40 Mg Tab)  40 mg PO QHS [facility]


Bisacodyl (Bisacodyl 10 Mg Rect Supp)  10 mg NV QDAY PRN


   PRN Reason: Constipation


Clopidogrel Bisulfate (Clopidogrel 75 Mg Tab)  75 mg PO QDAY [facility]


Dextrose (Dextrose 50% In Water (25gm) 50 Ml Syringe)  50 ml IV Q30MIN PRN; 

Protocol


   PRN Reason: Hypoglycemia


Diphenhydramine HCl (Diphenhydramine 50 Mg/Ml Vial)  25 mg IV Q6H PRN


   PRN Reason: Itching


   Last Admin: 12/24/20 06:19 Dose:  25 mg


   Documented by: 


Ezetimibe (Ezetimibe 10 Mg Tab)  10 mg PO QDAY [facility]


Enoxaparin Sodium (Enoxaparin 40 Mg/0.4 Ml Inj)  125 mg SUB-Q Q12HRT [facility]; 

Protocol


Furosemide (Furosemide 40 Mg/4 Ml Inj)  40 mg IV BID@0600,1800 MARI


   Last Admin: 12/24/20 06:16 Dose:  40 mg


   Documented by: 


Insulin Human Lispro (Insulin Lispro 100 Unit/Ml Vial 3 Ml)  0 unit SUB-Q ACHS S

; Protocol


Isosorbide Mononitrate (Isosorbide Mononitrate Er 60 Mg Tab)  120 mg PO DAILY 

[facility]


Losartan Potassium (Losartan 25 Mg Tab)  25 mg PO QDAY [facility]


Magnesium Hydroxide (Magnesium Hydroxide (Mom) Oral Liqd Udc)  30 ml PO Q4H PRN


   PRN Reason: Constipation


Metoclopramide HCl (Metoclopramide 10 Mg Tab)  10 mg PO Q6H PRN


   PRN Reason: Nausea And Vomiting


Metoprolol Succinate (Metoprolol Succinate Xl 100 Mg Tab)  100 mg PO QDAY [facility]


Nitroglycerin (Nitroglycerin 0.4 Mg Tab Subl)  0.4 mg SL .Q5MIN PRN


   PRN Reason: Chest Pain


   Last Admin: 12/24/20 00:39 Dose:  0.4 mg


   Documented by: 


Ondansetron HCl (Ondansetron 4 Mg/2 Ml Inj)  4 mg IV Q8H PRN


   PRN Reason: Nausea And Vomiting


Promethazine HCl (Promethazine 25 Mg Rect Supp)  25 mg NV Q6H PRN


   PRN Reason: Nausea And Vomiting


Sertraline HCl (Sertraline 50 Mg Tab)  50 mg PO QDAY [facility]


Sodium Chloride (Sodium Chloride 0.9% 10 Ml Flush Syringe)  10 ml IV PRN PRN


   PRN Reason: LINE FLUSH


Spironolactone (Spironolactone 50 Mg Tab)  50 mg PO QDAY [facility]











Review of Systems


All systems: negative (As per the HPI)





Physical Examination


                                   Vital Signs











Temp Pulse Resp BP Pulse Ox


 


 97.5 F L  69   20   126/73   97 


 


 12/23/20 14:05  12/23/20 14:05  12/23/20 14:05  12/23/20 14:05  12/23/20 14:05











General appearance: no acute distress, well-nourished


HEENT: Positive: PERRL, Mucus Membranes Moist


Neck: Positive: neck supple, trachea midline


Cardiac: Positive: Reg Rate and Rhythm, S1/S2.  Negative: Audible Murmur


Lungs: Positive: clear to auscultation, Normal Breath Sounds


Neuro: Positive: Grossly Intact


Abdomen: Positive: Soft, Active Bowel Sounds.  Negative: Tender, Distended


Female genitourinary: deferred


Skin: Positive: Clear


Incision: Cardiac Cath Site


Musculoskeletal: No Pain, Normal Range of Motion


Extremities: Present: normal.  Absent: edema





Results





                                 12/24/20 06:45





                                 12/24/20 06:45


                                   Coagulation











  12/23/20 Range/Units





  23:48 


 


PT  12.9  (12.2-14.9)  Sec.


 


INR  0.99  (0.87-1.13)  


 


APTT  29.1  (24.2-36.6)  Sec.








                                     Lipids











  12/24/20 Range/Units





  06:45 


 


Triglycerides  167 H  (2-149)  mg/dL


 


Cholesterol  136  ()  mg/dL


 


HDL Cholesterol  39 L  (40-59)  mg/dL


 


Cholesterol/HDL Ratio  3.48  %








                                       CBC











  12/23/20 12/24/20 Range/Units





  23:48 06:45 


 


WBC  5.5  5.9  (4.5-11.0)  K/mm3


 


RBC  4.03  3.93  (3.65-5.03)  M/mm3


 


Hgb  13.2  12.8  (10.1-14.3)  gm/dl


 


Hct  40.2  38.3  (30.3-42.9)  %


 


Plt Count  178  197  (140-440)  K/mm3


 


Lymph # (Auto)  1.4  1.6  (1.2-5.4)  K/mm3


 


Mono # (Auto)  0.5  0.6  (0.0-0.8)  K/mm3


 


Eos # (Auto)  0.1  0.2  (0.0-0.4)  K/mm3


 


Baso # (Auto)  0.0  0.1  (0.0-0.1)  K/mm3








                          Comprehensive Metabolic Panel











  12/23/20 12/24/20 Range/Units





  23:48 06:45 


 


Sodium  135 L  135 L  (137-145)  mmol/L


 


Potassium  3.7  3.6  (3.6-5.0)  mmol/L


 


Chloride  96.3 L  96.6 L  ()  mmol/L


 


Carbon Dioxide  27  31 H  (22-30)  mmol/L


 


BUN  26 H  26 H  (7-17)  mg/dL


 


Creatinine  1.2  1.2  (0.6-1.2)  mg/dL


 


Glucose  477 H  349 H  ()  mg/dL


 


Calcium  8.9  8.6  (8.4-10.2)  mg/dL














- Imaging and Cardiology


Stress echo: report reviewed (Cardiac PET in May 2020 no significant ischemia EF

20% fixed anterior septal and lateral defects)


Cardiac cath: report reviewed (Cardiac catheterization June 2019 at [facility] patent RCA LAD and circumflex stents distal vessels diffusely tapered off 

with small vessel disease.  The RCA shows multiple patent stents from the ostium

extending to the bifurcation distal RCA PDA PLV are small vessel disease severe 

diffuse disea)





EKG interpretations





- Telemetry


EKG Rhythm: Sinus Rhythm (Sinus rhythm nonspecific ST)





Assessment and Plan


45-year-old female morbid obesity hypertension hyperlipidemia diabetes not 

controlled presents with acute on chronic systolic heart failure getting IV 

Lasix patient is feeling better.  Echocardiogram shows thrombus in the RV lead 

is started on Lovenox and Coumadin stop aspirin continue Plavix.  Continue 

Toprol continue Imdur add losartan continue diabetes management.  Possible 

discharge in a.m.








- Patient Problems


(1) Thrombosis involving cardiac device


Current Visit: Yes   Status: Acute   


Qualifiers: 


   Encounter type: subsequent encounter   Qualified Code(s): T82.867D - 

Thrombosis due to cardiac prosthetic devices, implants and grafts, subsequent 

encounter   





(2) Acute on chronic systolic HF (heart failure)


Current Visit: No   Status: Acute   





(3) Chest pain


Current Visit: No   Status: Chronic   


Qualifiers: 


   Chest pain type: unspecified   Qualified Code(s): R07.9 - Chest pain, 

unspecified   





(4) Obesity hypoventilation syndrome


Current Visit: No   Status: Chronic   





(5) Uncontrolled diabetes mellitus


Current Visit: No   Status: Chronic   


Qualifiers: 


   Diabetes mellitus type: type 1   Glycemic state: with hyperglycemia   

Qualified Code(s): E10.65 - Type 1 diabetes mellitus with hyperglycemia   





(6) Unstable angina


Current Visit: No   Status: Chronic   





(7) AICD (automatic cardioverter/defibrillator) present


Current Visit: No   Status: Chronic   





(8) CAD (coronary artery disease)


Current Visit: No   Status: Chronic   


Qualifiers: 


   Coronary Disease-Associated Artery/Lesion type: native artery   Native vs. 

transplanted heart: native heart   Associated angina: with stable angina   

Qualified Code(s): I25.118 - Atherosclerotic heart disease of native coronary 

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   





(9) H/O percutaneous transluminal coronary angioplasty


Current Visit: No   Status: Chronic   





(10) Hyperlipidemia


Current Visit: No   Status: Chronic   


Qualifiers: 


   Hyperlipidemia type: mixed hyperlipidemia   Qualified Code(s): E78.2 - Mixed 

hyperlipidemia   





(11) Hypothyroidism


Current Visit: No   Status: Chronic   





(12) Ischemic cardiomyopathy


Current Visit: No   Status: Chronic

## 2020-12-24 NOTE — EMERGENCY DEPARTMENT REPORT
ED Neuro Deficit HPI





- General


Chief Complaint: Chest Pain


Stated Complaint: CHEST PAIN, SOB


Time Seen by Provider: 12/23/20 21:34


Source: patient, RN notes reviewed, old records reviewed


Mode of arrival: Ambulatory


Limitations: No Limitations





- History of Present Illness


Initial Comments: 


[facility]


[facility]





Stat Consult





Date of Service:   12/24/2020 01:02:29





Impression:


      I63.9 - Cerebrovascular accident (CVA), unspecified mechanism (HCC)





Comments/Sign-Out:


Acute onset left arm and leg numbness. Given significant past medical hx, 

concern is for a small vessel stroke No signs of LVO at this time.





CT HEAD:


Not Reviewed


Still pending





Metrics:


[facility] Notification Time: 12/24/2020 01:01:14


Stamp Time: 12/24/2020 01:02:29


Video Start Time: 12/24/2020 01:09:23


Video End Time: 12/24/2020 01:24:51





Our recommendations are outlined below.





Imaging Studies:


      MRI Head


      MRA Head and Neck Without Contrast When Available - Stroke Protocol


      Echocardiogram - Transthoracic Echocardiogram





Disposition:


Neurology Follow Up Recommended





Sign Out:


      Discussed with Emergency Department Provider





------------------------------------------------------------------------------

----------------------





Chief Complaint:


numbness





History of Present Illness:


Patient is a 45 year old Female.





45F with hx of DM2, CHF, CAD with PCI, TIA here for acute neuro changes Sx 1-2 

wks. Symptoms have not gotten better including shortness of breath and CP. 

Around 2130 her left arm started to get numb followed by left leg numbness. 

Takes asa/plavix


 





Anticoagulant use:  No


 





 





Examination:


BP(94/55), Pulse(62), Blood Glucose(440)


1A: Level of Consciousness - Alert; keenly responsive + 0


1B: Ask Month and Age - Both Questions Right + 0


1C: Blink Eyes & Squeeze Hands - Performs Both Tasks + 0


2: Test Horizontal Extraocular Movements - Normal + 0


3: Test Visual Fields - No Visual Loss + 0


4: Test Facial Palsy (Use Grimace if Obtunded) - Normal symmetry + 0


5A: Test Left Arm Motor Drift - No Drift for 10 Seconds + 0


5B: Test Right Arm Motor Drift - No Drift for 10 Seconds + 0


6A: Test Left Leg Motor Drift - No Drift for 5 Seconds + 0


6B: Test Right Leg Motor Drift - No Drift for 5 Seconds + 0


7: Test Limb Ataxia (FNF/Heel-Shin) - No Ataxia + 0


8: Test Sensation - Mild-Moderate Loss: Less Sharp/More Dull + 1


9: Test Language/Aphasia - Normal; No aphasia + 0


10: Test Dysarthria - Normal + 0


11: Test Extinction/Inattention - No abnormality + 0





NIHSS Score: 1





Patient/Family was informed the Neurology Consult would happen via TeleHealth 

consult by way of interactive audio and video telecommunications and consented 

to receiving care in this manner.





Due to the immediate potential for life-threatening deterioration due to 

underlying acute neurologic illness, I spent 25 minutes providing critical care.

This time includes time for face to face visit via telemedicine, review of 

medical records, imaging studies and discussion of findings with providers, the 

patient and/or family.








Dr Trevon Aiken








[facility]


[phone]





Case [id number]


 








- Related Data


Home Medications: 


                                Home Medications











 Medication  Instructions  Recorded  Confirmed  Last Taken


 


Isosorbide Mononitrate [Isosorbide 120 mg PO DAILY 02/05/15 07/15/20 09/20/19





Mononitrate ER (IMDUR)]    


 


Levothyroxine [Synthroid] 100 mcg PO QDAY 06/22/17 07/15/20 09/20/19


 


Insulin Aspart (Nf) [NovoLOG 100 5 unit SQ AC 07/15/20 07/15/20 07/14/20





UNITS/ML VIAL]    


 


Insulin Detemir [Levemir VIAL] 35 unit SQ BID 07/15/20 07/15/20 07/14/20


 


Torsemide [Demadex] 100 mg PO QDAY 07/15/20 07/15/20 07/14/20


 


Zolpidem (Nf) [Ambien (Nf)] 10 mg PO QHS 07/15/20 07/15/20 07/14/20








                                  Previous Rx's











 Medication  Instructions  Recorded  Last Taken  Type


 


Sertraline [Zoloft] 50 mg PO QDAY #30 tablet 04/27/17 09/20/19 Rx


 


AtorvaSTATin [Lipitor] 80 mg PO QHS  tablet 08/30/17 09/20/19 Rx


 


Cholecalciferol Vit D3 [Vitamin D3 1,000 unit PO QDAY  tablet 08/30/17 09/15/19 

Rx





1,000 UNIT TAB]    


 


Clopidogrel [Plavix] 75 mg PO QDAY  tablet 08/30/17 09/20/19 Rx


 


Ezetimibe [Zetia] 10 mg PO QDAY  tablet 08/30/17 09/20/19 Rx


 


Ferrous Sulfate [Feosol 325 MG tab] 325 mg PO QDAY  tablet 08/30/17 09/20/19 Rx


 


Gabapentin 600 mg PO TID  capsule 08/30/17 09/20/19 Rx


 


Spironolactone [Aldactone] 50 mg PO QDAY  tablet 08/30/17 09/20/19 Rx


 


Metoprolol Xl [Metoprolol 100 mg PO QDAY #30 tablet 04/25/20 Unknown Rx





SUCCINATE ER TAB]    


 


Aspirin EC [Ecotrin] 325 mg PO QDAY  tablet 07/16/20 Unknown Rx


 


Insulin Glargine [Lantus VIAL] 35 units SUB-Q BID  units 07/16/20 Unknown Rx


 


Nitroglycerin [Nitrostat] 0.4 mg SL Q5M PRN  tablet 07/16/20 Unknown Rx


 


Nitrofurantoin Mono/M-Cryst 100 mg PO Q12HR #14 capsule 07/27/20 Unknown Rx





[Macrobid CAP]    


 


Potassium Chloride [K-Dur] 20 meq PO BID #10 tab 07/27/20 Unknown Rx











Allergies/Adverse Reactions: 


                                    Allergies











Allergy/AdvReac Type Severity Reaction Status Date / Time


 


adhesive tape Allergy  Rash Verified 12/23/20 14:02


 


bupivacaine Allergy  Angioedema Verified 12/23/20 14:02


 


ketorolac [From Toradol] Allergy  Hives Verified 12/23/20 14:02


 


Sulfa (Sulfonamide Allergy  Nausea Verified 10/12/17 11:13





Antibiotics)     


 


famotidine [From Pepcid] AdvReac  Vomiting Verified 12/23/20 14:02














ED Review of Systems


ROS: 


Stated complaint: CHEST PAIN, SOB


Other details as noted in HPI





Constitutional: malaise.  denies: fever


ENT: congestion


Respiratory: orthopnea, shortness of breath, SOB with exertion, SOB at rest.  

denies: cough, wheezing


Cardiovascular: chest pain, dyspnea on exertion, edema


Gastrointestinal: denies: abdominal pain, nausea, vomiting


Genitourinary: denies: dysuria


Musculoskeletal: arthralgia, myalgia


Skin: denies: lesions


Neurological: weakness, numbness


Psychiatric: anxiety





ED Past Medical Hx





- Past Medical History


Hx Hypertension: Yes (CHF EF 15-20%)


Hx Heart Attack/AMI: Yes (reports 9)


Hx Congestive Heart Failure: Yes


Hx Diabetes: Yes


Hx GERD: Yes


Hx Liver Disease: No


Hx Renal Disease: Yes (CKD, stage 2)


Hx Seizures: No


Hx Asthma: No


Hx COPD: No


Hx Tuberculosis: No


Hx HIV: No


Additional medical history: CAD, gatroparesis, hypOthyroidism, retinopathy, 

neuropathy, elevated cholesterol.  15 stents





- Surgical History


Hx Coronary Stent: Yes (15)


Hx Pacemaker: Yes


Hx Internal Defibrillator: Yes (AICD)


Additional Surgical History: tubal ligation, left fallopian tube removed, right 

knee surgery, right arm surgery, left eye surgeryPOWER PORT,Pacemaker  Right 

ring finger surgery.DexCom continueous glucose meter, right abd





- Social History


Smoking Status: Unknown if ever smoked


Substance Use Type: None





- Medications


Home Medications: 


                                Home Medications











 Medication  Instructions  Recorded  Confirmed  Last Taken  Type


 


Isosorbide Mononitrate [Isosorbide 120 mg PO DAILY 02/05/15 07/15/20 09/20/19 

History





Mononitrate ER (IMDUR)]     


 


Sertraline [Zoloft] 50 mg PO QDAY #30 tablet 04/27/17 07/15/20 09/20/19 Rx


 


Levothyroxine [Synthroid] 100 mcg PO QDAY 06/22/17 07/15/20 09/20/19 History


 


AtorvaSTATin [Lipitor] 80 mg PO QHS  tablet 08/30/17 07/15/20 09/20/19 Rx


 


Cholecalciferol Vit D3 [Vitamin D3 1,000 unit PO QDAY  tablet 08/30/17 07/15/20 

09/15/19 Rx





1,000 UNIT TAB]     


 


Clopidogrel [Plavix] 75 mg PO QDAY  tablet 08/30/17 07/15/20 09/20/19 Rx


 


Ezetimibe [Zetia] 10 mg PO QDAY  tablet 08/30/17 07/15/20 09/20/19 Rx


 


Ferrous Sulfate [Feosol 325 MG tab] 325 mg PO QDAY  tablet 08/30/17 07/15/20 

09/20/19 Rx


 


Gabapentin 600 mg PO TID  capsule 08/30/17 07/15/20 09/20/19 Rx


 


Spironolactone [Aldactone] 50 mg PO QDAY  tablet 08/30/17 07/15/20 09/20/19 Rx


 


Metoprolol Xl [Metoprolol 100 mg PO QDAY #30 tablet 04/25/20 07/15/20 Unknown Rx





SUCCINATE ER TAB]     


 


Insulin Aspart (Nf) [NovoLOG 100 5 unit SQ AC 07/15/20 07/15/20 07/14/20 History





UNITS/ML VIAL]     


 


Insulin Detemir [Levemir VIAL] 35 unit SQ BID 07/15/20 07/15/20 07/14/20 History


 


Torsemide [Demadex] 100 mg PO QDAY 07/15/20 07/15/20 07/14/20 History


 


Zolpidem (Nf) [Ambien (Nf)] 10 mg PO QHS 07/15/20 07/15/20 07/14/20 History


 


Aspirin EC [Ecotrin] 325 mg PO QDAY  tablet 07/16/20  Unknown Rx


 


Insulin Glargine [Lantus VIAL] 35 units SUB-Q BID  units 07/16/20  Unknown Rx


 


Nitroglycerin [Nitrostat] 0.4 mg SL Q5M PRN  tablet 07/16/20  Unknown Rx


 


Nitrofurantoin Mono/M-Cryst 100 mg PO Q12HR #14 capsule 07/27/20  Unknown Rx





[Macrobid CAP]     


 


Potassium Chloride [K-Dur] 20 meq PO BID #10 tab 07/27/20  Unknown Rx














ED Neuro Physical Exam





- General


Limitations: No Limitations


General appearance: alert, anxious, obese


Suspected Stroke: Yes





- NIHSS


Assessment Interval: Baseline


1a. Level of Consciousness: alert/keenly responsive


1b. LOC Questions: answers both correctly


1c. LOC Commands: performs tasks correctly


2. Best Gaze: normal


3. Visual: no visual loss


4. Facial Palsy: normal symmetrical movement


5b. Motor Arm Right: no drift


5a. Motor Arm Left: no drift


6a. Motor Leg Left: no drift


6b. Motor Leg Right: no drift


7. Limb Ataxia: absent


8. Sensory: mild/moderate sensory loss


9. Best Language: no aphasia


10. Dysarthria: normal


11. Extinction/Inattention: no abnormality


Total Score: 1


Stroke Severity: Minor Stroke





ED Course





                                   Vital Signs











  12/23/20 12/23/20 12/24/20





  14:08 22:24 00:38


 


Temperature 97.5 F L  


 


Pulse Rate 67  


 


Respiratory 18 18 18





Rate   


 


Blood Pressure   


 


Blood Pressure 126/73  





[Right]   


 


O2 Sat by Pulse 98  





Oximetry   














  12/24/20





  00:39


 


Temperature 


 


Pulse Rate 76


 


Respiratory 





Rate 


 


Blood Pressure 111/76


 


Blood Pressure 





[Right] 


 


O2 Sat by Pulse 





Oximetry 














- Lab Data


Result diagrams: 


                                 12/23/20 23:48





                                 12/23/20 23:48





                                   Lab Results











  12/23/20 12/23/20 12/23/20 Range/Units





  23:48 23:48 23:48 


 


WBC  5.5    (4.5-11.0)  K/mm3


 


RBC  4.03    (3.65-5.03)  M/mm3


 


Hgb  13.2    (10.1-14.3)  gm/dl


 


Hct  40.2    (30.3-42.9)  %


 


MCV  100 H    (79-97)  fl


 


MCH  33 H    (28-32)  pg


 


MCHC  33    (30-34)  %


 


RDW  15.7 H    (13.2-15.2)  %


 


Plt Count  178    (140-440)  K/mm3


 


Lymph % (Auto)  26.4    (13.4-35.0)  %


 


Mono % (Auto)  8.7 H    (0.0-7.3)  %


 


Eos % (Auto)  2.3    (0.0-4.3)  %


 


Baso % (Auto)  0.7    (0.0-1.8)  %


 


Lymph # (Auto)  1.4    (1.2-5.4)  K/mm3


 


Mono # (Auto)  0.5    (0.0-0.8)  K/mm3


 


Eos # (Auto)  0.1    (0.0-0.4)  K/mm3


 


Baso # (Auto)  0.0    (0.0-0.1)  K/mm3


 


Seg Neutrophils %  61.9    (40.0-70.0)  %


 


Seg Neutrophils #  3.4    (1.8-7.7)  K/mm3


 


PT   12.9   (12.2-14.9)  Sec.


 


INR   0.99   (0.87-1.13)  


 


APTT   29.1   (24.2-36.6)  Sec.


 


Estimated GFR    49  ml/min


 


BUN/Creatinine Ratio    22  %


 


Troponin T    < 0.010  (0.00-0.029)  ng/mL














  12/23/20 Range/Units





  23:48 


 


WBC   (4.5-11.0)  K/mm3


 


RBC   (3.65-5.03)  M/mm3


 


Hgb   (10.1-14.3)  gm/dl


 


Hct   (30.3-42.9)  %


 


MCV   (79-97)  fl


 


MCH   (28-32)  pg


 


MCHC   (30-34)  %


 


RDW   (13.2-15.2)  %


 


Plt Count   (140-440)  K/mm3


 


Lymph % (Auto)   (13.4-35.0)  %


 


Mono % (Auto)   (0.0-7.3)  %


 


Eos % (Auto)   (0.0-4.3)  %


 


Baso % (Auto)   (0.0-1.8)  %


 


Lymph # (Auto)   (1.2-5.4)  K/mm3


 


Mono # (Auto)   (0.0-0.8)  K/mm3


 


Eos # (Auto)   (0.0-0.4)  K/mm3


 


Baso # (Auto)   (0.0-0.1)  K/mm3


 


Seg Neutrophils %   (40.0-70.0)  %


 


Seg Neutrophils #   (1.8-7.7)  K/mm3


 


PT   (12.2-14.9)  Sec.


 


INR   (0.87-1.13)  


 


APTT   (24.2-36.6)  Sec.


 


Estimated GFR   ml/min


 


BUN/Creatinine Ratio   %


 


Troponin T  < 0.010  (0.00-0.029)  ng/mL











Critical care attestation.: 


If time is entered above; I have spent that time in minutes in the direct care 

of this critically ill patient, excluding procedure time.








ED Disposition


Clinical Impression: 


 CVA (cerebral infarction)





Disposition: [id number] OP ADMIT IP TO THIS HOSP


Is pt being admited?: Yes


Condition: Good


Instructions:  Chest Pain (ED)


Referrals: 


PRIMARY CARE,MD [Primary Care Provider] - 3-5 Days

## 2020-12-24 NOTE — CAT SCAN REPORT
CT BRAIN: 12/24/2020



INDICATION / CLINICAL INFORMATION:

Patient complains of a headache, LT arm numbness, RT leg numbness.



COMPARISON: 

CT brain 7/27/2020. CT brain 7/14/2020.



FINDINGS:



BRAIN/INTRACRANIAL STRUCTURES: Unenhanced CT images of the brain were obtained and compared to prior 
exams. There is been no change. There is no evidence of acute intracranial abnormality.



Ventricles and sulci are within normal limits of size and shape for a patient of this age.



There is no evidence of ischemic injury, hemorrhage, or mass. There are no abnormal extra-axial fluid
 collections.





EXTRACRANIAL STRUCTURES: Unremarkable.







IMPRESSION:

 Negative exam. No change when compared to prior exams.









All CT scans at this location are performed using dose reduction to ALARA by means of automated expos
ure control.



Signer Name: Javi Coker MD 

Signed: 12/24/2020 2:53 AM

Workstation Name: [id number]

## 2020-12-24 NOTE — CONSULTATION
History of Present Illness


Consult date: 12/24/20


Reason for Consult: shortness of breath, chest pain, headache, left arm numbness


History of present illness: 





This is a comprehensive neurological consultation on Ms. Rissa Abraham who is a 

very pleasant 45-year-old woman presented with multiple symptoms of shortness of

breath, chest pain, left arm numbness along with moderate degree of headache.  

Patient has history of MI in 2010 as well as congestive heart failure.  She has 

been on aspirin and Plavix since 2008 and denied any missing the dose of these 

medications.  She reported that she never had any migraine or may be when she 

was teenager, now she reports that she has headache about 8 out of 10 pain grade

scale, throbbing type not associated with any phonophobia or photophobia.  Downey

deepak she did have nausea and vomiting yesterday nor today.  In addition, she 

reported some numbness in the left arm but denied any symptoms on her left side 

of the face or left leg.  She denied any weakness however as well.she also has 

pacemaker/defibrillator that was inserted into thousand 10.  Her CT scan of the 

brain is unremarkable.  CT angios of head and neck are unremarkable.  2D echo 

was performed today where her ejection fraction is 1015%.





Past History


Past Medical History: CAD (15 stents in the past), diabetes, GERD, heart 

failure, hypertension, hyperlipidemia, hypothyroidism, pulmonary embolism, renal

failure, other (Retinopathy, neuropathy,obesity)


Past Surgical History: Other (Pacemaker placement, tubal ligation, right knee 

surgery, right arm surgery, left eye surgery, port placement, cardiac stent 

placement )


Social history: no significant social history


Family history: no significant family history





Medications and Allergies


                                    Allergies











Allergy/AdvReac Type Severity Reaction Status Date / Time


 


adhesive tape Allergy  Rash Verified 12/23/20 14:02


 


bupivacaine Allergy  Angioedema Verified 12/23/20 14:02


 


ketorolac [From Toradol] Allergy  Hives Verified 12/23/20 14:02


 


Sulfa (Sulfonamide Allergy  Nausea Verified 10/12/17 11:13





Antibiotics)     


 


famotidine [From Pepcid] AdvReac  Vomiting Verified 12/23/20 14:02











                                Home Medications











 Medication  Instructions  Recorded  Confirmed  Last Taken  Type


 


Isosorbide Mononitrate [Isosorbide 120 mg PO DAILY 02/05/15 12/24/20 09/20/19 

History





Mononitrate ER (IMDUR)]     


 


Sertraline [Zoloft] 50 mg PO QDAY #30 tablet 04/27/17 12/24/20 09/20/19 Rx


 


Levothyroxine [Synthroid] 100 mcg PO QDAY 06/22/17 12/24/20 09/20/19 History


 


AtorvaSTATin [Lipitor] 80 mg PO QHS  tablet 08/30/17 12/24/20 09/20/19 Rx


 


Cholecalciferol Vit D3 [Vitamin D3 1,000 unit PO QDAY  tablet 08/30/17 12/24/20 

09/15/19 Rx





1,000 UNIT TAB]     


 


Clopidogrel [Plavix] 75 mg PO QDAY  tablet 08/30/17 12/24/20 09/20/19 Rx


 


Ezetimibe [Zetia] 10 mg PO QDAY  tablet 08/30/17 12/24/20 09/20/19 Rx


 


Ferrous Sulfate [Feosol 325 MG tab] 325 mg PO QDAY  tablet 08/30/17 12/24/20 09/20/19 Rx


 


Gabapentin 600 mg PO TID  capsule 08/30/17 12/24/20 09/20/19 Rx


 


Spironolactone [Aldactone] 50 mg PO QDAY  tablet 08/30/17 12/24/20 09/20/19 Rx


 


Metoprolol Xl [Metoprolol 100 mg PO QDAY #30 tablet 04/25/20 12/24/20 Unknown Rx





SUCCINATE ER TAB]     


 


Insulin Aspart (Nf) [NovoLOG 100 5 unit SQ AC 07/15/20 12/24/20 07/14/20 History





UNITS/ML VIAL]     


 


Insulin Detemir [Levemir VIAL] 35 unit SQ BID 07/15/20 12/24/20 07/14/20 History


 


Torsemide [Demadex] 100 mg PO QDAY 07/15/20 12/24/20 07/14/20 History


 


Zolpidem (Nf) [Ambien (Nf)] 10 mg PO QHS 07/15/20 12/24/20 07/14/20 History


 


Aspirin EC [Ecotrin] 325 mg PO QDAY  tablet 07/16/20 12/24/20 Unknown Rx


 


Insulin Glargine [Lantus VIAL] 35 units SUB-Q BID  units 07/16/20 12/24/20 

Unknown Rx


 


Nitroglycerin [Nitrostat] 0.4 mg SL Q5M PRN  tablet 07/16/20 12/24/20 Unknown Rx


 


Nitrofurantoin Mono/M-Cryst 100 mg PO Q12HR #14 capsule 07/27/20 12/24/20 

Unknown Rx





[Macrobid CAP]     


 


Potassium Chloride [K-Dur] 20 meq PO BID #10 tab 07/27/20 12/24/20 Unknown Rx











Active Meds: 


Active Medications





Acetaminophen (Acetaminophen 325 Mg Tab)  650 mg PO Q4H PRN


   PRN Reason: Pain MILD(1-3)/Fever >100.5/HA


   Last Admin: 12/24/20 12:19 Dose:  650 mg


   Documented by: 


Atorvastatin Calcium (Atorvastatin 40 Mg Tab)  40 mg PO QHS MARI


Bisacodyl (Bisacodyl 10 Mg Rect Supp)  10 mg OH QDAY PRN


   PRN Reason: Constipation


Clopidogrel Bisulfate (Clopidogrel 75 Mg Tab)  75 mg PO QDAY [facility]


   Last Admin: 12/24/20 12:19 Dose:  75 mg


   Documented by: 


Dextrose (Dextrose 50% In Water (25gm) 50 Ml Syringe)  50 ml IV Q30MIN PRN; 

Protocol


   PRN Reason: Hypoglycemia


Diphenhydramine HCl (Diphenhydramine 50 Mg/Ml Vial)  25 mg IV Q6H PRN


   PRN Reason: Itching


   Last Admin: 12/24/20 12:20 Dose:  25 mg


   Documented by: 


Ezetimibe (Ezetimibe 10 Mg Tab)  10 mg PO QDAY [facility]


Enoxaparin Sodium (Enoxaparin 150 Mg/1 Ml Inj)  130 mg SUB-Q Q12HR [facility]


Furosemide (Furosemide 40 Mg/4 Ml Inj)  40 mg IV BID@0600,1800 [facility]


   Last Admin: 12/24/20 06:16 Dose:  40 mg


   Documented by: 


Insulin Human Lispro (Insulin Lispro 100 Unit/Ml Vial 3 Ml)  0 unit SUB-Q ACHS 

[facility]; Protocol


   Last Admin: 12/24/20 14:16 Dose:  Not Given


   Documented by: 


Isosorbide Mononitrate (Isosorbide Mononitrate Er 60 Mg Tab)  120 mg PO DAILY 

[facility]


Losartan Potassium (Losartan 25 Mg Tab)  25 mg PO QDAY [facility]


   Last Admin: 12/24/20 12:19 Dose:  25 mg


   Documented by: 


Magnesium Hydroxide (Magnesium Hydroxide (Mom) Oral Liqd Udc)  30 ml PO Q4H PRN


   PRN Reason: Constipation


Metoclopramide HCl (Metoclopramide 10 Mg Tab)  10 mg PO Q6H PRN


   PRN Reason: Nausea And Vomiting


Metoprolol Succinate (Metoprolol Succinate Xl 100 Mg Tab)  100 mg PO QDAY [facility]


Nitroglycerin (Nitroglycerin 0.4 Mg Tab Subl)  0.4 mg SL .Q5MIN PRN


   PRN Reason: Chest Pain


   Last Admin: 12/24/20 00:39 Dose:  0.4 mg


   Documented by: 


Ondansetron HCl (Ondansetron 4 Mg/2 Ml Inj)  4 mg IV Q8H PRN


   PRN Reason: Nausea And Vomiting


Promethazine HCl (Promethazine 25 Mg Rect Supp)  25 mg OH Q6H PRN


   PRN Reason: Nausea And Vomiting


Sertraline HCl (Sertraline 50 Mg Tab)  50 mg PO QDAY [facility]


   Last Admin: 12/24/20 12:19 Dose:  50 mg


   Documented by: 


Sodium Chloride (Sodium Chloride 0.9% 10 Ml Flush Syringe)  10 ml IV PRN PRN


   PRN Reason: LINE FLUSH


Spironolactone (Spironolactone 50 Mg Tab)  50 mg PO QDAY [facility]


   Last Admin: 12/24/20 12:19 Dose:  50 mg


   Documented by: 


Warfarin Sodium (Warfarin 7.5 Mg Tab)  7.5 mg PO DAILY@1700 [facility]











Review of Systems


All systems: negative (headache, numbness in the left arm and tingling in both 

legs)





Physical Examination





- Vital Signs


Vital Signs: 


                                   Vital Signs











Temp Pulse Resp BP Pulse Ox


 


 97.5 F L  69   20   126/73   97 


 


 12/23/20 14:05  12/23/20 14:05  12/23/20 14:05  12/23/20 14:05  12/23/20 14:05














- Physical Exam


Narrative exam: 





Comprehensive Neurological Examinations:


Mental status: alert.  Fund of knowledge-normal.  Affect-appropriate.  Recent 

memory-normal.  Remote memory-normal.  Attention span-normal.  Cognitive 

function-normal.  Thought content/perception-normal


Speech-normal





Cranial nerves:





II Optic: Visual acuity-bilateral-normal.  Visual field-normal.


III Oculomotor: Bilateral-normal


IV Trochlear: Bilateral-normal


V Trigeminal: Bilateral-normal.


VI Abducens: Bilateral-normal


VII Facial: Bilateral-normal


VIII Acoustic: bilateral-hearing-normal( tested by finger rub)


IX Glossopharyngeal/ X Vagus-uvula-normal


XI Accessory-normal shoulder shrug


XII Hypoglossal-bilateral-normal





Eye movements: Gaze-bilateral-normal


Nystagmus: Bilateral-none





Motor:


Bulk and contour: Normal


Tone: Normal


Strength: Head and neck-normal


                   Upper extremities: Right-no drift  Left-no drift


                   Lower extremities: Right-no drift Left-no drift


DTRs: Deferred


Plantar reflexes( Babinski)-bilateral-plantar flexion


Sensory: Light touch/pinprick-decreased light touch the right upper and lower 

extremities


                 


Coordination: No impairment Heel-to-Shin, no impairment of finger-to-nose or no 

impairment of rapid alternating movements.


Gait/Station: not tested since patient feels imbalance








- Constitutional


General appearance: comfortable





- EENT


EENT: Present: ATNC, PERRL, hearing intact, vision intact





- Respiratory


Respiratory: Present: chest non-tender, no respiratory distress





- Cardiovascular


Cardiovascular: Present: regular rate


Extremities: Present: no peripheral edema bilatateraly





- Gastrointestinal


Gastrointestinal: Present: soft, non-tender





- Integumentary


Integumentary: Present: normal





- Level of Consciousness


1a. Level of Consciousness: alert/keenly responsive





- LOC Questions


1b. LOC Questions: answers both correctly





- LOC Command


1c. LOC Commands: performs tasks correctly





- Best Gaze


2. Best Gaze: normal





- Visual


3. Visual: no visual loss





- Facial Palsy


4. Facial Palsy: normal symmetrical movement





- Motor Arm


5a. Motor Arm Left: no drift


5b. Motor Arm Right: no drift





- Motor Leg


6a. Motor Leg Left: no drift


6b. Motor Leg Right: no drift





- Limb Ataxia


7. Limb Ataxia: absent





- Sensory


8. Sensory: mild/moderate sensory loss (right arm and right leg)





- Best Language


9. Best Language: no aphasia





- Dysarthria


10. Dysarthria: normal





- Extinction and Inattention


11. Extinction/Inattention: no abnormality





- Scoring


Total Score: 1


Stroke Severity: Minor Stroke





Results





- Laboratory Findings


CBC and BMP: 


                                 12/24/20 06:45





                                 12/24/20 06:45


Abnormal Lab Findings: 


                                  Abnormal Labs











  12/23/20 12/23/20 12/23/20





  23:48 23:48 23:48


 


MCV  100 H  


 


MCH  33 H  


 


RDW  15.7 H  


 


Mono % (Auto)  8.7 H  


 


Sodium   135 L 


 


Chloride   96.3 L 


 


Carbon Dioxide   


 


BUN   26 H 


 


Glucose   477 H 


 


POC Glucose   


 


Hemoglobin A1c   


 


NT-Pro-B Natriuret Pep    2372 H


 


Triglycerides   


 


HDL Cholesterol   














  12/24/20 12/24/20 12/24/20





  01:27 06:45 06:45


 


MCV   98 H 


 


MCH   33 H 


 


RDW   15.7 H 


 


Mono % (Auto)   10.1 H 


 


Sodium    135 L


 


Chloride    96.6 L


 


Carbon Dioxide    31 H


 


BUN    26 H


 


Glucose    349 H


 


POC Glucose  440 H  


 


Hemoglobin A1c   


 


NT-Pro-B Natriuret Pep   


 


Triglycerides    167 H


 


HDL Cholesterol    39 L














  12/24/20 12/24/20





  06:45 12:20


 


MCV  


 


MCH  


 


RDW  


 


Mono % (Auto)  


 


Sodium  


 


Chloride  


 


Carbon Dioxide  


 


BUN  


 


Glucose  


 


POC Glucose   256 H


 


Hemoglobin A1c  9.9 H 


 


NT-Pro-B Natriuret Pep  


 


Triglycerides  


 


HDL Cholesterol  














- Diagnostic Findings


Additional findings: 





I personally reviewed her CT scan of the brain, CT injury of head and neck and 

they are all negative.





Assessment and Plan





- Patient Problems


(1) TIA (transient ischemic attack)


Current Visit: Yes   Status: Acute   


Plan to address problem: 


Plan:


1) There is a potential for causing transient ischemic attack on the right 

thalamic region given the history of EF 1015%, and other vascular risk 

factors.patient is already been on dual antiplatelet therapy for her coronary 

artery disease diagnosis since 2000 8/2010 and has not missed the dose of these 

medications.  Alternatives to current dual antiplatelet therapy, Aggrenox is 

available although he does not have any coronary protection and therefore 

patient will remain on current dual antiplatelet therapy, they are aspirin and 

Plavix at present time.  She is already on anticholesterol drug as well now.


2) She may need MRI of brain if possible to document if she has at all had 

embolic event from the heart due to extremely low EF then she may be a candidate

for oral anticoagulant like warfare in or if there is any evidence of PAF, in 

that case she may be a candidate for Noac therapy.  Since she has AICDpacer and

defibrillator, one has to coordinate with [employer] to reprogram if she gets 

an MRI of the brain as I have recommended. since her CT scan of the brain does 

not show any evidence of low density area on the right side of the brain, one 

cannot document that she has had stroke.  To see embolic stroke she needs an MRI

scan of the brain.


3) Please provide her secondary stroke prevention education that includes weight

reduction, smoking cessation, compliance to her current medications.


4) Please provide her PT/OT/speech evaluation and treatment to satisfy the core 

measure of TIA/stroke guidelines.








(2) Acute headache


Current Visit: Yes   Status: Acute   


Plan to address problem: 


Plan:


1) Please run the blood place for ESR, CRP, KIRSTEN to make sure her headache is not

due to inflammatory conditions like arteritis.


2) May use Tylenol 500 mg every 4-6 hourly as needed for headache.








(3) Automatic implantable cardioverter-defibrillator in situ


Current Visit: No   Status: Chronic   


Plan to address problem: 


Plan:


1) continue care as per her cardiologist recommendations.








(4) Chronic systolic heart failure


Current Visit: No   Status: Chronic   


Plan to address problem: 


Plan:


1) continue care as per her cardiologist recommendations.





I discussed at length with the patient regarding his condition and possible 

treatment options.  I have answered multiple questions posed by the patient to 

their best satisfactions.  Patient agreed with the plan.





Thank you very much for allowing us in the care of your patient.  Please call us

if you have any questions.





De Wright MD


Tele-neurologist


[phone]

## 2020-12-25 VITALS — DIASTOLIC BLOOD PRESSURE: 65 MMHG | SYSTOLIC BLOOD PRESSURE: 106 MMHG

## 2020-12-25 LAB
BASOPHILS # (AUTO): 0 K/MM3 (ref 0–0.1)
BASOPHILS NFR BLD AUTO: 0.7 % (ref 0–1.8)
BUN SERPL-MCNC: 24 MG/DL (ref 7–17)
BUN/CREAT SERPL: 18 %
CALCIUM SERPL-MCNC: 8.8 MG/DL (ref 8.4–10.2)
EOSINOPHIL # BLD AUTO: 0.4 K/MM3 (ref 0–0.4)
EOSINOPHIL NFR BLD AUTO: 6 % (ref 0–4.3)
HCT VFR BLD CALC: 42.7 % (ref 30.3–42.9)
HEMOLYSIS INDEX: 4
HGB BLD-MCNC: 14.3 GM/DL (ref 10.1–14.3)
INR PPP: 1.04 (ref 0.87–1.13)
LYMPHOCYTES # BLD AUTO: 1.8 K/MM3 (ref 1.2–5.4)
LYMPHOCYTES NFR BLD AUTO: 27.9 % (ref 13.4–35)
MCHC RBC AUTO-ENTMCNC: 34 % (ref 30–34)
MCV RBC AUTO: 99 FL (ref 79–97)
MONOCYTES # (AUTO): 0.5 K/MM3 (ref 0–0.8)
MONOCYTES % (AUTO): 7.7 % (ref 0–7.3)
PLATELET # BLD: 198 K/MM3 (ref 140–440)
RBC # BLD AUTO: 4.3 M/MM3 (ref 3.65–5.03)

## 2020-12-25 RX ADMIN — GABAPENTIN SCH MG: 300 CAPSULE ORAL at 13:06

## 2020-12-25 RX ADMIN — GABAPENTIN SCH MG: 300 CAPSULE ORAL at 07:49

## 2020-12-25 RX ADMIN — CLOPIDOGREL BISULFATE SCH MG: 75 TABLET ORAL at 10:48

## 2020-12-25 RX ADMIN — FUROSEMIDE SCH MG: 10 INJECTION, SOLUTION INTRAVENOUS at 07:49

## 2020-12-25 RX ADMIN — SERTRALINE SCH MG: 50 TABLET, FILM COATED ORAL at 10:48

## 2020-12-25 RX ADMIN — INSULIN LISPRO SCH UNIT: 100 INJECTION, SOLUTION INTRAVENOUS; SUBCUTANEOUS at 13:05

## 2020-12-25 RX ADMIN — MORPHINE SULFATE PRN MG: 2 INJECTION, SOLUTION INTRAMUSCULAR; INTRAVENOUS at 04:30

## 2020-12-25 RX ADMIN — INSULIN GLARGINE SCH UNITS: 100 INJECTION, SOLUTION SUBCUTANEOUS at 10:47

## 2020-12-25 RX ADMIN — MORPHINE SULFATE PRN MG: 2 INJECTION, SOLUTION INTRAMUSCULAR; INTRAVENOUS at 10:48

## 2020-12-25 RX ADMIN — MORPHINE SULFATE PRN MG: 2 INJECTION, SOLUTION INTRAMUSCULAR; INTRAVENOUS at 00:19

## 2020-12-25 RX ADMIN — INSULIN LISPRO SCH: 100 INJECTION, SOLUTION INTRAVENOUS; SUBCUTANEOUS at 07:30

## 2020-12-25 RX ADMIN — LOSARTAN POTASSIUM SCH MG: 25 TABLET, FILM COATED ORAL at 10:48

## 2020-12-25 RX ADMIN — SPIRONOLACTONE SCH MG: 50 TABLET ORAL at 10:48

## 2020-12-25 RX ADMIN — ENOXAPARIN SODIUM SCH MG: 150 INJECTION SUBCUTANEOUS at 10:47

## 2020-12-25 NOTE — EVENT NOTE
Date: 12/24/20





Patient admitted early AM





Per Dr Lim





Echocardiogram done at bedside shows severe LV dysfunction but echodensity on 

the RV lead suggestive of possible thrombus.  Patient chest pain is atypical in 

nature with negative troponin.  Has had multiple stress test and cardiac 

catheterizations.


Cardiac catheterization June 2019 at [facility] patent RCA LAD and 

circumflex stents distal vessels diffusely tapered off with small vessel 

disease.  The RCA shows multiple patent stents from the ostium extending to the 

bifurcation distal RCA PDA PLV are small vessel disease severe diffuse disease 

EF 15 to 20%.  Similar findings on previous catheterizations in 2018 in 2017


Cardiac PET in May 2020 shows no significant ischemia severe fixed defects in 

anterior inferior and septal region








*echodensity on the RV lead suggestive of possible thrombus.


Hence patient can be discharged tomorrow if  can arrange for Lovenox

as a bridge to Warfarin


F/u in Coumadin clinic with Dr Lim





Prescriptions printed and signed

## 2020-12-25 NOTE — DISCHARGE SUMMARY
Providers





- Providers


Date of Admission: 


12/24/20 03:08





Date of discharge: 12/25/20


Attending physician: 


BOB MCCORMICK





                                        





12/24/20


Consult to Cardiac Rehabilitation [CONS] Routine 


   Reason For Exam: Phase I





12/24/20 03:59


Consult to Cardiology [CONS] Routine 


   Consulting Provider: KATIE ROMERO


   Reason For Exam: chest pain


Consult to Dietitian/Nutrition [CONS] Routine 


   Physician Instructions: 


   Reason For Exam: 


   Reason for Consult: Diet education


Consult to Physician [CONS] Routine 


   Comment: 


   Consulting Provider: SILVIO ELDRIDGE


   Physician Instructions: 


   Reason For Exam: Left arm numbness R/O CVA


Occupational Therapy Evaluate and Treat [CONS] Routine 


   Comment: 


   Reason For Exam: Neuro deficits


Physical Therapy Evaluation and Treat [CONS] Routine 


   Comment: 


   Reason For Exam: Neuro deficits











Primary care physician: 


PRIMARY CARE MD








Hospitalization


Condition: Good


Hospital course: 


45-year-old female with morbid obesity diabetes hypertension hyperlipidemia with

 frequent admissions to multiple hospital systems for chest pain congestive 

heart failure exacerbation calims to be compliamt with home meds presented to ER

 for chest pain, SOB, left arm numbness.  Work-up in the emergency room  reveals

 hyperglycemia with blood sugar in the 400s.  BNP was also elevated. CT scan of 

the head and chest x-ray were unremarkable. Patient admitted for work-up of 

hypoglycemia, chest pain left arm numbness and CHF. Patient's had multiple 

catheterizations and stress test. Cardiac catheterization June 2019 at [facility] 

[facility] patent RCA LAD and circumflex stents distal vessels diffusely tapered off 

with small vessel disease.  The RCA shows multiple patent stents from the ostium

 extending to the bifurcation distal RCA PDA PLV are small vessel disease severe

 diffuse disease EF 15 to 20%.  Similar findings on previous catheterizations in

 2018 in 2017. Cardiac PET in May 2020 shows no significant ischemia severe 

fixed defects in anterior inferior and septal region. During this admission 

Echocardiogram done at bedside shows severe LV dysfunction but echodensity on 

the RV lead suggestive of possible thrombus. Her troponin was negative.  

Cardiology recommended to place on Coumadin with Lovenox bridge.  Patient also 

received 1 dose of Lasix in the ER IV.  Patient kept on observation and her 

symptom's where will compensated and stable.  Her head CT, CTA head and neck, 

carotid Doppler showed normal findings.  Patient was cleared by cardiology for 

discharge but she decided to appeal the discharge.  Then later on in the day she

 decided to go home.  Patient was then discharged home in stable condition.





Discharge diagnosis:


(1) Thrombosis involving cardiac device


Current Visit: Yes   Status: Acute   


Qualifiers: 


   Encounter type: subsequent encounter   Qualified Code(s): T82.867D - 

Thrombosis due to cardiac prosthetic devices, implants and grafts, subsequent 

encounter   





(2) Acute on Chronic systolic HF (heart failure), compensated


Current Visit: No   Status: acute  





(3) Chest pain - from GERD


Current Visit: No   Status: Chronic   


Qualifiers: 


   Chest pain type: unspecified   Qualified Code(s): R07.9 - Chest pain, 

unspecified   





(4) Obesity hypoventilation syndrome


Current Visit: No   Status: Chronic   





(5) Uncontrolled diabetes mellitus


Current Visit: No   Status: Chronic   


Qualifiers: 


   Diabetes mellitus type: type 1   Glycemic state: with hyperglycemia   

Qualified Code(s): E10.65 - Type 1 diabetes mellitus with hyperglycemia   





(6) Unstable angina


Current Visit: No   Status: Chronic   





(7) AICD (automatic cardioverter/defibrillator) present


Current Visit: No   Status: Chronic   





(8) CAD (coronary artery disease)


Current Visit: No   Status: Chronic   


Qualifiers: 


   Coronary Disease-Associated Artery/Lesion type: native artery   Native vs. 

transplanted heart: native heart   Associated angina: with stable angina   

Qualified Code(s): I25.118 - Atherosclerotic heart disease of native coronary 

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   





(9) H/O percutaneous transluminal coronary angioplasty


Current Visit: No   Status: Chronic   





(10) Hyperlipidemia


Current Visit: No   Status: Chronic   


Qualifiers: 


   Hyperlipidemia type: mixed hyperlipidemia   Qualified Code(s): E78.2 - Mixed 

hyperlipidemia   





(11) Hypothyroidism


Current Visit: No   Status: Chronic   





(12) Ischemic cardiomyopathy


Current Visit: No   Status: Chronic   





Disposition: DC/TX-06 HOME UNDER HOME HLTH


Time spent for discharge: 34 minutes





Core Measure Documentation





- Palliative Care


Palliative Care/ Comfort Measures: Not Applicable





- Core Measures


Any of the following diagnoses?: heart failure





Exam





- Constitutional


Vitals: 


                                        











Temp Pulse Resp BP Pulse Ox


 


 97.3 F L  66   20   124/65   96 


 


 12/25/20 08:18  12/25/20 08:18  12/25/20 08:18  12/25/20 08:18  12/25/20 08:18














Plan


Activity: advance as tolerated


Weight Bearing Status: Non-Weight Bearing


Diet: diabetic


Special Instructions: restrict fluid intake to (1L per day)


Follow up with: 


PRIMARY CARE,MD [Primary Care Provider] - 3-5 Days


Forms:  Warfarin Discharge Instruction


Prescriptions: 


Zolpidem (Nf) [Ambien (Nf)] 10 mg PO QHS #10


AtorvaSTATin [Lipitor] 80 mg PO QHS #30 tablet


Spironolactone [Aldactone] 50 mg PO QDAY #30 tablet


Warfarin [Coumadin] 7.5 mg PO DAILY@1700 30 Days #30 tablet


Torsemide [Demadex] 100 mg PO QDAY #30


Aspirin EC [Ecotrin] 325 mg PO QDAY #30 tablet


Ferrous Sulfate [Feosol 325 MG tab] 325 mg PO QDAY #30 tablet


Gabapentin 600 mg PO TID #90 capsule


Potassium Chloride [K-Dur] 20 meq PO BID #30 tab


Insulin Glargine [Lantus VIAL] 35 units SUB-Q BID #10 pen


Enoxaparin Sodium [Lovenox] 120 mg SQ Q12H 4 Days #8 syringe


Nitrofurantoin [place]/M-Cryst [Macrobid CAP] 100 mg PO Q12HR #14 capsule


Nitroglycerin [Nitrostat] 0.4 mg SL Q5M PRN #25 tablet


 PRN Reason: Chest Pain


Insulin Aspart (Nf) [NovoLOG 100 UNITS/ML VIAL] 5 unit SQ AC #1 vial


Levothyroxine [Synthroid] 100 mcg PO QDAY #30


Sertraline [Zoloft] 50 mg PO QDAY #30 tablet

## 2020-12-25 NOTE — PROGRESS NOTE
Assessment and Plan


45-year-old female with ischemic cardiomyopathy is compensated heart failure.  

Advised patient be compliant with medications and diet.  Patient has had 

multiple ER visits and hospitalizations.  Patient needs to follow-up in the 

clinic.  There is now a questionable thrombus on the RV lead.  Continue Lovenox 

and Coumadin patient urged to follow-up in office next week for INR check.  

Patient may be discharged from cardiovascular point of view





- Patient Problems


(1) Thrombosis involving cardiac device


Current Visit: Yes   Status: Acute   


Qualifiers: 


   Encounter type: subsequent encounter   Qualified Code(s): T82.867D - 

Thrombosis due to cardiac prosthetic devices, implants and grafts, subsequent 

encounter   





(2) Acute on chronic systolic HF (heart failure)


Current Visit: No   Status: Acute   





(3) Chest pain


Current Visit: No   Status: Chronic   


Qualifiers: 


   Chest pain type: unspecified   Qualified Code(s): R07.9 - Chest pain, 

unspecified   





(4) Obesity hypoventilation syndrome


Current Visit: No   Status: Chronic   





(5) Uncontrolled diabetes mellitus


Current Visit: No   Status: Chronic   


Qualifiers: 


   Diabetes mellitus type: type 1   Glycemic state: with hyperglycemia   

Qualified Code(s): E10.65 - Type 1 diabetes mellitus with hyperglycemia   





(6) Unstable angina


Current Visit: No   Status: Chronic   





(7) AICD (automatic cardioverter/defibrillator) present


Current Visit: No   Status: Chronic   





(8) CAD (coronary artery disease)


Current Visit: No   Status: Chronic   


Qualifiers: 


   Coronary Disease-Associated Artery/Lesion type: native artery   Native vs. 

transplanted heart: native heart   Associated angina: with stable angina   

Qualified Code(s): I25.118 - Atherosclerotic heart disease of native coronary 

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   





(9) H/O percutaneous transluminal coronary angioplasty


Current Visit: No   Status: Chronic   





(10) Hyperlipidemia


Current Visit: No   Status: Chronic   


Qualifiers: 


   Hyperlipidemia type: mixed hyperlipidemia   Qualified Code(s): E78.2 - Mixed 

hyperlipidemia   





(11) Hypothyroidism


Current Visit: No   Status: Chronic   





(12) Ischemic cardiomyopathy


Current Visit: No   Status: Chronic   





Subjective


Date of service: 12/25/20


Principal diagnosis: chf


Interval history: 





sob has improved and able to ly flat





Objective


                                   Vital Signs











  Temp Pulse Resp Resp BP BP Pulse Ox


 


 12/25/20 08:18  97.3 F L  66  20   124/65   96


 


 12/25/20 05:00   65     


 


 12/25/20 04:30    20    


 


 12/25/20 04:22  97.8 F  61  18   109/58   96


 


 12/25/20 00:19    20    


 


 12/25/20 00:02  97.5 F L  59 L  18   112/62   99


 


 12/25/20 00:00     20   


 


 12/24/20 22:06  97.8 F  65  18   117/63   96


 


 12/24/20 21:00   63     


 


 12/24/20 17:09  97.9 F  59 L  18   105/52   97


 


 12/24/20 12:54  98.2 F  59 L  18    105/55  97














- Physical Examination


General: No Apparent Distress


HEENT: Positive: PERRL, Mucus Membranes Moist


Neck: Positive: neck supple, trachea midline


Cardiac: Positive: Reg Rate and Rhythm


Lungs: Positive: clear to auscultation


Neuro: Positive: Grossly Intact


Abdomen: Positive: Soft, Active Bowel Sounds.  Negative: Tender, Distended


Skin: Positive: Clear


Incision: Cardiac Cath Site


Musculoskeletal: No Pain, Normal Range of Motion


Extremities: Present: normal.  Absent: edema





- Labs and Meds


                                   Coagulation











  12/25/20 Range/Units





  09:43 


 


PT  13.5  (12.2-14.9)  Sec.


 


INR  1.04  (0.87-1.13)  








                                       CBC











  12/25/20 Range/Units





  09:43 


 


WBC  6.3  (4.5-11.0)  K/mm3


 


RBC  4.30  (3.65-5.03)  M/mm3


 


Hgb  14.3  (10.1-14.3)  gm/dl


 


Hct  42.7  (30.3-42.9)  %


 


Plt Count  198  (140-440)  K/mm3


 


Lymph # (Auto)  1.8  (1.2-5.4)  K/mm3


 


Mono # (Auto)  0.5  (0.0-0.8)  K/mm3


 


Eos # (Auto)  0.4  (0.0-0.4)  K/mm3


 


Baso # (Auto)  0.0  (0.0-0.1)  K/mm3














- Imaging and Cardiology


Stress echo: report reviewed (Cardiac PET in May 2020 no significant ischemia EF

20% fixed anterior septal and lateral defects)


Cardiac cath: report reviewed (Cardiac catheterization June 2019 at [facility] patent RCA LAD and circumflex stents distal vessels diffusely tapered off 

with small vessel disease.  The RCA shows multiple patent stents from the ostium

extending to the bifurcation distal RCA PDA PLV are small vessel disease severe 

diffuse disea)





- Telemetry


EKG Rhythm: Sinus Rhythm

## 2021-03-18 ENCOUNTER — HOSPITAL ENCOUNTER (EMERGENCY)
Dept: HOSPITAL 5 - ED | Age: 46
Discharge: LEFT BEFORE DISCHARGE | End: 2021-03-18
Payer: MEDICARE

## 2021-03-18 VITALS — DIASTOLIC BLOOD PRESSURE: 71 MMHG | SYSTOLIC BLOOD PRESSURE: 107 MMHG

## 2021-03-18 DIAGNOSIS — R06.02: ICD-10-CM

## 2021-03-18 DIAGNOSIS — R07.89: Primary | ICD-10-CM

## 2021-03-18 DIAGNOSIS — Z53.21: ICD-10-CM

## 2021-03-18 PROCEDURE — 93005 ELECTROCARDIOGRAM TRACING: CPT

## 2021-03-18 NOTE — EVENT NOTE
ED Screening Note


Date of service: 03/18/21


Time: 16:35


ED Screening Note: 


Lanes of chest pain shortness of breath x4 days





This initial assessment/diagnostic orders/clinical plan/treatment(s) is/are 

subject to change based on patients health status, clinical progression and re-

assessment by fellow clinical providers in the ED. Further treatment and workup 

at subsequent clinical providers discretion. Patient/guardian urged not to elope

from the ED as their condition may be serious if not clinically assessed and 

managed. 





Initial orders include: 


Labs


EKG


Chest x-ray

## 2021-05-10 ENCOUNTER — HOSPITAL ENCOUNTER (EMERGENCY)
Dept: HOSPITAL 5 - ED | Age: 46
LOS: 1 days | End: 2021-05-11
Payer: MEDICARE

## 2021-05-10 VITALS — SYSTOLIC BLOOD PRESSURE: 109 MMHG | DIASTOLIC BLOOD PRESSURE: 59 MMHG

## 2021-05-10 DIAGNOSIS — R07.89: Primary | ICD-10-CM

## 2021-05-10 DIAGNOSIS — Z53.21: ICD-10-CM

## 2021-05-10 LAB
ALBUMIN SERPL-MCNC: 3.7 G/DL (ref 3.9–5)
ALT SERPL-CCNC: 26 UNITS/L (ref 7–56)
APTT BLD: 54.9 SEC. (ref 24.2–36.6)
BASOPHILS # (AUTO): 0.1 K/MM3 (ref 0–0.1)
BASOPHILS NFR BLD AUTO: 0.9 % (ref 0–1.8)
BUN SERPL-MCNC: 50 MG/DL (ref 7–17)
BUN/CREAT SERPL: 23 %
CALCIUM SERPL-MCNC: 8.9 MG/DL (ref 8.4–10.2)
EOSINOPHIL # BLD AUTO: 0.2 K/MM3 (ref 0–0.4)
EOSINOPHIL NFR BLD AUTO: 2.9 % (ref 0–4.3)
HCT VFR BLD CALC: 39 % (ref 30.3–42.9)
HEMOLYSIS INDEX: 4
HGB BLD-MCNC: 12.8 GM/DL (ref 10.1–14.3)
INR PPP: 6.16 (ref 0.87–1.13)
LYMPHOCYTES # BLD AUTO: 1.9 K/MM3 (ref 1.2–5.4)
LYMPHOCYTES NFR BLD AUTO: 24.9 % (ref 13.4–35)
MCHC RBC AUTO-ENTMCNC: 33 % (ref 30–34)
MCV RBC AUTO: 96 FL (ref 79–97)
MONOCYTES # (AUTO): 0.8 K/MM3 (ref 0–0.8)
MONOCYTES % (AUTO): 10.1 % (ref 0–7.3)
PLATELET # BLD: 352 K/MM3 (ref 140–440)
RBC # BLD AUTO: 4.06 M/MM3 (ref 3.65–5.03)

## 2021-05-10 PROCEDURE — 85610 PROTHROMBIN TIME: CPT

## 2021-05-10 PROCEDURE — 82550 ASSAY OF CK (CPK): CPT

## 2021-05-10 PROCEDURE — 85025 COMPLETE CBC W/AUTO DIFF WBC: CPT

## 2021-05-10 PROCEDURE — 80053 COMPREHEN METABOLIC PANEL: CPT

## 2021-05-10 PROCEDURE — 93005 ELECTROCARDIOGRAM TRACING: CPT

## 2021-05-10 PROCEDURE — 36415 COLL VENOUS BLD VENIPUNCTURE: CPT

## 2021-05-10 PROCEDURE — 83735 ASSAY OF MAGNESIUM: CPT

## 2021-05-10 PROCEDURE — 71046 X-RAY EXAM CHEST 2 VIEWS: CPT

## 2021-05-10 PROCEDURE — 85730 THROMBOPLASTIN TIME PARTIAL: CPT

## 2021-05-10 PROCEDURE — 84484 ASSAY OF TROPONIN QUANT: CPT

## 2021-05-10 NOTE — XRAY REPORT
CHEST 2 VIEWS 



INDICATION / CLINICAL INFORMATION: Chest Pain.



COMPARISON: 12/23/20.



FINDINGS:



SUPPORT DEVICES: The positions of the right jugular Port-A-Cath and the single lead left subclavian I
CD have not changed. A small metallic device overlies the left retrocardiac region, unchanged.

HEART / MEDIASTINUM: There is mild cardiomegaly. Coronary artery calcification/stent is noted. 

LUNGS / PLEURA: No significant pulmonary or pleural abnormality. No pneumothorax. 



ADDITIONAL FINDINGS: No significant additional findings.



IMPRESSION: No acute abnormality or significant change.



Signer Name: Ector Ramos MD 

Signed: 5/10/2021 6:20 PM

Workstation Name: [id number]

## 2021-05-10 NOTE — EVENT NOTE
ED Screening Note


ED Screening Note: 








Patient states that she was sent by [facility]


She states that that she was in their office on Friday and had blood work drawn 

and was advised to report to the emergency room immediately


She states for the last 2 weeks she has had fatigue, shortness of breath worse 

with exertion, dizziness, chest pain described as a tightness





She was advised by the cardiologist that her INR was 7.2 and her BNP was 916 and

that her PTT was 69.7





This initial assessment/diagnostic orders/clinical plan/treatment(s) is/are 

subject to change based on patients health status, clinical progression and re-

assessment by fellow clinical providers in the ED. Further treatment and workup 

at subsequent clinical providers discretion. Patient/guardian urged not to elope

from the ED as their condition may be serious if not clinically assessed and 

managed. 





Initial orders include: 


CP protocol

## 2021-05-13 NOTE — ELECTROCARDIOGRAPH REPORT
[facility]

                                       

Test Date:    2021-05-10               Test Time:    17:14:47

Pat Name:     JULITA LOVE             Department:   

Patient ID:   [id number]          Room:          

Gender:       F                        Technician:   

:          [id number]               Requested By: GREGORY LAZO

Order Number: [id number]              Reading MD:   Eduardo Foster

                                 Measurements

Intervals                              Axis          

Rate:         52                       P:            44

MD:           198                      QRS:          43

QRSD:         126                      T:            -26

QT:           524                                    

QTc:          482                                    

                           Interpretive Statements

Sinus bradycardia

Occasional PVCs

Cannot exclude the presence of a ventricular pacemaker on demand

Probable left atrial enlargement

Nonspecific intraventricular conduction delay

Compared to ECG 2021 16:38:21

Electronically Signed On 2021 11:39:25 EDT by Eduardo Foster

## 2021-12-13 ENCOUNTER — HOSPITAL ENCOUNTER (OUTPATIENT)
Dept: HOSPITAL 5 - ED | Age: 46
Setting detail: OBSERVATION
LOS: 1 days | Discharge: HOME | End: 2021-12-14
Attending: INTERNAL MEDICINE | Admitting: INTERNAL MEDICINE
Payer: MEDICAID

## 2021-12-13 DIAGNOSIS — I25.10: ICD-10-CM

## 2021-12-13 DIAGNOSIS — E11.65: ICD-10-CM

## 2021-12-13 DIAGNOSIS — E11.22: ICD-10-CM

## 2021-12-13 DIAGNOSIS — Z98.51: ICD-10-CM

## 2021-12-13 DIAGNOSIS — Z98.890: ICD-10-CM

## 2021-12-13 DIAGNOSIS — Z79.4: ICD-10-CM

## 2021-12-13 DIAGNOSIS — I25.5: ICD-10-CM

## 2021-12-13 DIAGNOSIS — Z95.810: ICD-10-CM

## 2021-12-13 DIAGNOSIS — N18.2: ICD-10-CM

## 2021-12-13 DIAGNOSIS — E87.6: ICD-10-CM

## 2021-12-13 DIAGNOSIS — Z95.1: ICD-10-CM

## 2021-12-13 DIAGNOSIS — Z91.19: ICD-10-CM

## 2021-12-13 DIAGNOSIS — Z79.82: ICD-10-CM

## 2021-12-13 DIAGNOSIS — I25.2: ICD-10-CM

## 2021-12-13 DIAGNOSIS — R55: ICD-10-CM

## 2021-12-13 DIAGNOSIS — E78.00: ICD-10-CM

## 2021-12-13 DIAGNOSIS — E78.2: ICD-10-CM

## 2021-12-13 DIAGNOSIS — E03.9: ICD-10-CM

## 2021-12-13 DIAGNOSIS — I11.0: ICD-10-CM

## 2021-12-13 DIAGNOSIS — K21.9: ICD-10-CM

## 2021-12-13 DIAGNOSIS — I24.9: Primary | ICD-10-CM

## 2021-12-13 DIAGNOSIS — Z79.899: ICD-10-CM

## 2021-12-13 DIAGNOSIS — I50.9: ICD-10-CM

## 2021-12-13 LAB
ALBUMIN SERPL-MCNC: 3.8 G/DL (ref 3.9–5)
ALT SERPL-CCNC: 17 UNITS/L (ref 7–56)
BASOPHILS # (AUTO): 0 K/MM3 (ref 0–0.1)
BASOPHILS NFR BLD AUTO: 0.8 % (ref 0–1.8)
BUN SERPL-MCNC: 30 MG/DL (ref 7–17)
BUN/CREAT SERPL: 20 %
CALCIUM SERPL-MCNC: 8.7 MG/DL (ref 8.4–10.2)
EOSINOPHIL # BLD AUTO: 0.2 K/MM3 (ref 0–0.4)
EOSINOPHIL NFR BLD AUTO: 2.7 % (ref 0–4.3)
HCT VFR BLD CALC: 44.7 % (ref 30.3–42.9)
HEMOLYSIS INDEX: 18
HGB BLD-MCNC: 14.4 GM/DL (ref 10.1–14.3)
INR PPP: 0.95 (ref 0.87–1.13)
LYMPHOCYTES # BLD AUTO: 1.3 K/MM3 (ref 1.2–5.4)
LYMPHOCYTES NFR BLD AUTO: 22 % (ref 13.4–35)
MCHC RBC AUTO-ENTMCNC: 32 % (ref 30–34)
MCV RBC AUTO: 100 FL (ref 79–97)
MONOCYTES # (AUTO): 0.6 K/MM3 (ref 0–0.8)
MONOCYTES % (AUTO): 10.5 % (ref 0–7.3)
PLATELET # BLD: 222 K/MM3 (ref 140–440)
RBC # BLD AUTO: 4.48 M/MM3 (ref 3.65–5.03)

## 2021-12-13 PROCEDURE — 99285 EMERGENCY DEPT VISIT HI MDM: CPT

## 2021-12-13 PROCEDURE — 96376 TX/PRO/DX INJ SAME DRUG ADON: CPT

## 2021-12-13 PROCEDURE — 85610 PROTHROMBIN TIME: CPT

## 2021-12-13 PROCEDURE — 85025 COMPLETE CBC W/AUTO DIFF WBC: CPT

## 2021-12-13 PROCEDURE — 71046 X-RAY EXAM CHEST 2 VIEWS: CPT

## 2021-12-13 PROCEDURE — 80048 BASIC METABOLIC PNL TOTAL CA: CPT

## 2021-12-13 PROCEDURE — 96372 THER/PROPH/DIAG INJ SC/IM: CPT

## 2021-12-13 PROCEDURE — 83036 HEMOGLOBIN GLYCOSYLATED A1C: CPT

## 2021-12-13 PROCEDURE — 36415 COLL VENOUS BLD VENIPUNCTURE: CPT

## 2021-12-13 PROCEDURE — 96375 TX/PRO/DX INJ NEW DRUG ADDON: CPT

## 2021-12-13 PROCEDURE — 84484 ASSAY OF TROPONIN QUANT: CPT

## 2021-12-13 PROCEDURE — 84702 CHORIONIC GONADOTROPIN TEST: CPT

## 2021-12-13 PROCEDURE — 93005 ELECTROCARDIOGRAM TRACING: CPT

## 2021-12-13 PROCEDURE — 83880 ASSAY OF NATRIURETIC PEPTIDE: CPT

## 2021-12-13 PROCEDURE — 80053 COMPREHEN METABOLIC PANEL: CPT

## 2021-12-13 PROCEDURE — G0378 HOSPITAL OBSERVATION PER HR: HCPCS

## 2021-12-13 PROCEDURE — 82550 ASSAY OF CK (CPK): CPT

## 2021-12-13 PROCEDURE — 83735 ASSAY OF MAGNESIUM: CPT

## 2021-12-13 PROCEDURE — 96374 THER/PROPH/DIAG INJ IV PUSH: CPT

## 2021-12-13 PROCEDURE — 82962 GLUCOSE BLOOD TEST: CPT

## 2021-12-13 RX ADMIN — AMIODARONE HYDROCHLORIDE SCH MG: 200 TABLET ORAL at 19:04

## 2021-12-13 RX ADMIN — TORSEMIDE SCH MG: 100 TABLET ORAL at 15:08

## 2021-12-13 NOTE — XRAY REPORT
CHEST 2 VIEWS 



INDICATION / CLINICAL INFORMATION:

CHEST PAIN.



COMPARISON: 

Chest x-ray on 5/10/2021



FINDINGS:



SUPPORT DEVICES: Stable position of right IJ port and cardiac ICD.



HEART / MEDIASTINUM: Stable borderline cardiomegaly. 



LUNGS / PLEURA: No significant pulmonary or pleural abnormality. No pneumothorax. 



ADDITIONAL FINDINGS: No significant additional findings.



IMPRESSION:

1. No acute findings.



Signer Name: Dilshad Dougherty MD 

Signed: 12/13/2021 12:40 PM

Workstation Name: [id number]

## 2021-12-13 NOTE — CONSULTATION
History of Present Illness


Consult date: 12/13/21


Requesting physician: BRIAN RUEDA


Consult reason: chest pain


History of present illness: 





Patient is a 46-year-old female with a past medical history of HFrEF s/p AICD, 

coronary artery disease s/p multiple PCI, ischemic cardiomyopathy, CKD, 

hypertension, morbid obesity, chronic pain syndrome, ICD lead thrombus 

(previously on Coumadin), hypothyroidism who presents to the ED with a complaint

of chest pain which started earlier this morning.  Patient reports that she has 

chronic chest pain that she normally rates 4 out of 10 that she states is dull 

and achy in nature.  She reports that she woke up this morning with chest pain 

rating it 10 out of 10 located in her left side of her chest that radiated to 

her back.  She describes the pain as sharp and stabbing.  She states the pain is

constant and sometimes is worse with exertion.  She also reports some shortness 

of breath and fatigue. She reports that she took three nitroglycerin with no 

relief from pain.  She states the only relief she has gotten it has come from 

the pain medication she received at the hospital.  Patient has been previously 

seen by our group recently at [facility] this past June and follows along 

with Dr. Wu of our office.  Cardiology is consulted for chest pain and that 

patient is previously known to us.





Past History


Past Medical History: acute MI, CAD, diabetes, hypertension, hyperlipidemia, 

other (ischemic cariodmyopathy, CKD)


Social history: denies: smoking, alcohol abuse


Family history: CAD, diabetes, hypertension





Medications and Allergies


                                    Allergies











Allergy/AdvReac Type Severity Reaction Status Date / Time


 


adhesive tape Allergy  Rash Verified 12/13/21 12:19


 


bupivacaine Allergy  Angioedema Verified 12/13/21 12:19


 


ketorolac [From Toradol] Allergy  Hives Verified 12/13/21 12:19


 


Sulfa (Sulfonamide Allergy  Nausea Verified 12/13/21 12:19





Antibiotics)     


 


famotidine [From Pepcid] AdvReac  Vomiting Verified 12/13/21 12:19











                                Home Medications











 Medication  Instructions  Recorded  Confirmed  Last Taken  Type


 


Isosorbide Mononitrate [Isosorbide 120 mg PO DAILY 02/05/15 12/13/21 12/13/21 

06:00 History





Mononitrate ER (IMDUR)]     


 


Cholecalciferol Vit D3 [Vitamin D3 1,000 unit PO QDAY  tablet 08/30/17 12/13/21 12/13/21 09:00 Rx





1,000 UNIT TAB]     


 


Clopidogrel [Plavix] 75 mg PO QDAY  tablet 08/30/17 12/13/21 12/13/21 06:00 Rx


 


Ezetimibe [Zetia] 10 mg PO QDAY  tablet 08/30/17 12/13/21 12/12/21 21:00 Rx


 


Metoprolol Xl [Metoprolol 100 mg PO QDAY #30 tablet 04/25/20 12/13/21 Unknown Rx





SUCCINATE ER TAB]     


 


Insulin Detemir [Levemir VIAL] 35 unit SQ BID 07/15/20 12/13/21 12/12/21 21:00 

History


 


Aspirin EC [Ecotrin] 325 mg PO QDAY #30 tablet 12/24/20 12/13/21 12/13/21 06:00 

Rx


 


AtorvaSTATin [Lipitor] 80 mg PO QHS #30 tablet 12/24/20 12/13/21 12/12/21 21:00 

Rx


 


Enoxaparin Sodium [Lovenox] 120 mg SQ Q12H 4 Days #8 syringe 12/24/20 12/13/21 

Unknown Rx


 


Ferrous Sulfate [Feosol 325 MG tab] 325 mg PO QDAY #30 tablet 12/24/20 12/13/21 12/13/21 09:00 Rx


 


Gabapentin 600 mg PO TID #90 capsule 12/24/20 12/13/21 12/13/21 06:00 Rx


 


Insulin Aspart (Nf) [NovoLOG 100 5 unit SQ AC #1 vial 12/24/20 12/13/21 12/13/21

 06:00 Rx





UNITS/ML VIAL]     


 


Insulin Glargine [Lantus VIAL] 35 units SUB-Q BID #10 pen 12/24/20 12/13/21 

Unknown Rx


 


Levothyroxine [Synthroid] 100 mcg PO QDAY #30 12/24/20 12/13/21 12/13/21 06:00 

Rx


 


Nitrofurantoin Mono/M-Cryst 100 mg PO Q12HR #14 capsule 12/24/20 12/13/21 

Unknown Rx





[Macrobid CAP]     


 


Nitroglycerin [Nitrostat] 0.4 mg SL Q5M PRN #25 tablet 12/24/20 12/13/21 Unknown

 Rx


 


Potassium Chloride [K-Dur] 20 meq PO BID #30 tab 12/24/20 12/13/21 12/13/21 

06:00 Rx


 


Sertraline [Zoloft] 50 mg PO QDAY #30 tablet 12/24/20 12/13/21 12/13/21 06:00 Rx


 


Spironolactone [Aldactone] 50 mg PO QDAY #30 tablet 12/24/20 12/13/21 12/13/21 

06:00 Rx


 


Torsemide [Demadex] 100 mg PO QDAY #30 12/24/20 12/13/21 1 Day Ago Rx





    ~12/12/21 


 


Warfarin [Coumadin] 7.5 mg PO DAILY@1700 30 Days #30 12/24/20 12/13/21 Unknown 

Rx





 tablet    


 


Zolpidem (Nf) [Ambien (Nf)] 10 mg PO QHS #10 12/24/20 12/13/21 1 Day Ago Rx





    ~12/12/21 











Active Meds: 


Active Medications





Nitroglycerin (Nitroglycerin 0.4 Mg Tab Subl)  0.4 mg SL .Q5MIN PRN


   PRN Reason: Chest Pain


Torsemide (Torsemide 100 Mg Tab)  100 mg PO QDAY MARI


   Last Admin: 12/13/21 15:08 Dose:  100 mg


   Documented by: 











Review of Systems


Constitutional: weakness, no weight loss, no weight gain, no fever, no chills


Ears, nose, mouth and throat: no nasal discharge, no sinus pressure, no sinus 

pain


Cardiovascular: chest pain, shortness of breath


Respiratory: shortness of breath, no cough with sputum, no excessive sputum, no 

hemoptysis, no pleurisy, no pain on inspiration


Gastrointestinal: no nausea, no vomiting, no diarrhea, no constipation


Musculoskeletal: no neck stiffness, no neck pain, no shooting arm pain


Integumentary: no rash, no pruritis, no redness


Neurological: no paralysis, no weakness, no parathesias


Psychiatric: no anxiety, no memory loss


Endocrine: no cold intolerance, no heat intolerance


Hematologic/Lymphatic: no easy bruising, no easy bleeding





Physical Examination


                                   Vital Signs











Temp Pulse Resp BP Pulse Ox


 


 98.8 F   77   18   133/87   100 


 


 12/13/21 12:19  12/13/21 12:19  12/13/21 12:19  12/13/21 12:19  12/13/21 12:19











General appearance: no acute distress


HEENT: Positive: PERRL


Neck: Positive: trachea midline


Cardiac: Positive: Reg Rate and Rhythm


Lungs: Positive: Normal Breath Sounds


Neuro: Positive: Grossly Intact


Abdomen: Positive: Soft


Extremities: Present: upper extr. pulses.  Absent: edema





Results





                                 12/13/21 13:33





                                 12/14/21 03:22


                                 Cardiac Enzymes











  12/13/21 Range/Units





  13:33 


 


AST  21  (5-40)  units/L








                                   Coagulation











  12/13/21 Range/Units





  13:33 


 


PT  13.7  (12.2-14.9)  Sec.


 


INR  0.95  (0.87-1.13)  








                                       CBC











  12/13/21 Range/Units





  13:33 


 


WBC  6.0  (4.5-11.0)  K/mm3


 


RBC  4.48  (3.65-5.03)  M/mm3


 


Hgb  14.4 H  (10.1-14.3)  gm/dl


 


Hct  44.7 H  (30.3-42.9)  %


 


Plt Count  222  (140-440)  K/mm3


 


Lymph # (Auto)  1.3  (1.2-5.4)  K/mm3


 


Mono # (Auto)  0.6  (0.0-0.8)  K/mm3


 


Eos # (Auto)  0.2  (0.0-0.4)  K/mm3


 


Baso # (Auto)  0.0  (0.0-0.1)  K/mm3








                          Comprehensive Metabolic Panel











  12/13/21 Range/Units





  13:33 


 


Sodium  135 L  (137-145)  mmol/L


 


Potassium  3.1 L  (3.6-5.0)  mmol/L


 


Chloride  92.6 L  ()  mmol/L


 


Carbon Dioxide  27  (22-30)  mmol/L


 


BUN  30 H  (7-17)  mg/dL


 


Creatinine  1.5 H  (0.6-1.2)  mg/dL


 


Glucose  393 H  ()  mg/dL


 


Calcium  8.7  (8.4-10.2)  mg/dL


 


AST  21  (5-40)  units/L


 


ALT  17  (7-56)  units/L


 


Alkaline Phosphatase  77  ()  units/L


 


Total Protein  6.7  (6.3-8.2)  g/dL


 


Albumin  3.8 L  (3.9-5)  g/dL














- Imaging and Cardiology


Echo: report reviewed


Cardiac cath: report reviewed





EKG interpretations





- Telemetry


EKG Rhythm: Sinus Rhythm





- EKG


Sinus rhythms and dysrhythmias: sinus rhythm


Myocardial infarction: anterior MI (old age or i





Assessment and Plan





Patient is a 46-year-old female with a past medical history of HFrEF s/p AICD, 

coronary artery disease s/p multiple PCI, ischemic cardiomyopathy, CKD, 

hypertension, morbid obesity, chronic pain syndrome, ICD lead thrombus 

(previously on Coumadin), hypothyroidism who presents to the ED with a complaint

of chest pain which started morning of admission





Echo 5/13/2021-LVEF 15 - 20%. LV size is severely enlarged. Severe global LV 

hypokinesis. IV Lumason contrast was given. Grade III (severe) diastolic 

dysfunction. Elevated left atrial pressure RV not well visualized. Abnormal RV 

diastolic function. Pacemaker or ICD lead seen in the RV LA Volume Index s 

46.53, moderately dilated Tricuspid aortic valve. Mild aortic valve sclerosis. 

Mild mitral regurgitation. Mild tricuspid regurgitation. TR pk grad 42.3 mmHg TR

Vmax 3.05 m/s IVC diam 2.39 cm RV systolic pressure is moderately elevated. Mild

to moderate pulmonary hypertension. RA pressure 8 mmHg RVSP (TR) 50.3 mmHg


Cardiac PET stress test 5/20/2020-large 20% defect of the basal to apical 

anterior and mid anterior lateral walls consistent with myocardial infarction 

with some degree of noel-infarct ischemia.  There is a second large 20% defect 

over the basal to apical inferior lateral and apical lateral walls consistent 

with myocardial infarction.  Lastly there is a small 8% mid septal wall defect 

consistent with myocardial ischemia.  When compared to prior studies only the 

small degree of septal ischemia is new.  EF 26%.


Cardiac cath 6/20/2019-multivessel coronary artery disease with stents present 

in the RCA, LAD and circumflex.  LAD showed multiple patent stent present in pr

oximal and mid segment.  After the stent the vessel tapers to a small caliber 

severely diffuse diseased vessels.  Left circumflex shows multiple patent stent 

in the proximal and mid segments.  Distal vessels in the obtuse marginal vessels

are small vessels that show severe diffuse disease throughout.  Right coronary 

artery shows multiple patent stents from the ostium extending to the bifurcation

of the distal RCA.  The PDA and PLV are small vessels with severe diffuse 

disease throughout.  Severe ischemic cardiomyopathy EF 10 to 15%








Atypical Chest pain


Chronic HFrEF-on Torsemide 100mg daily as an outpatient 


ICMP s/p AICD, CardioMEMS


ICD Lead Thrombus Previously on Coumadin for thrombus on lead since 11/2020


H/o MI/CAD s/p PCI 2017 -h/o multiple stents & in-stent restenoses, 

severesmall vessel diseasenot amenable to interventionon cath 6/2019


Chronic Stable Angina


H/o Frequent VT/ICD Discharge - on PO [id number] daily as an outpatient


RACHEL onCKD


HTN


IDDM/Neuropathy


Hypothyroidism- on Synthroid


Morbid Obesity


Chronic Pain Syndrome


Anxiety/Depression


H/o COVID-19 Infection 1/2021


Noncompliance








Plan:


EKG shows sinus rhythm rate 81.  No acute ischemic changes troponins negative 

x1.  Repeat cardiac enzymes pending


Resume outpatient medications: Imdur 120 mg p.o. daily, metolazone 5 mg p.o. 

every 48 hours, torsemide 100 mg p.o. daily, amiodarone 100 mg p.o. daily, 

Plavix, spironolactone 50 mg p.o. daily.


Patient is not on beta-blocker as an outpatient due to previous history of 

bradycardia.  Patient is not on ACE or ARB as an outpatient due to soft blood 

pressures.  As per documentation will defer her heart failure clinic as an 

outpatient for initiation of ACE or ARB


Patient has had extensive cardiac work-up in the past with no further 

intervention indicated.  Medical management recommended.  If cardiac enzymes 

continue to be negative patient may be discharged from a cardiac perspective.


Patient seen in conjunction with Dr. Lim who agrees with this plan of care





- Patient Problems


(1) Noncompliance


Current Visit: Yes   Status: Acute   





(2) Acute chest pain


Current Visit: Yes   Status: Acute   





(3) CAD (coronary artery disease)


Current Visit: Yes   Status: Chronic   


Qualifiers: 


   Coronary Disease-Associated Artery/Lesion type: native artery   Native vs. 

transplanted heart: native heart 





(4) CKD (chronic kidney disease)


Current Visit: Yes   Status: Chronic   


Qualifiers: 


 





(5) Hyperglycemia


Current Visit: Yes   Status: Chronic   





(6) Hx of heart artery stent


Current Visit: No   Status: Acute   





(7) AICD (automatic cardioverter/defibrillator) present


Current Visit: No   Status: Chronic   





(8) Back pain


Current Visit: No   Status: Chronic   





(9) Depression


Onset Date: 11/14/15   Current Visit: No   Status: Chronic   





(10) Hyperlipidemia


Current Visit: No   Status: Chronic   


Qualifiers: 


 





(11) Hypertension


Current Visit: No   Status: Chronic   





(12) Hypothyroidism


Current Visit: No   Status: Chronic   





(13) Ischemic cardiomyopathy


Current Visit: No   Status: Chronic   





(14) Hypokalemia


Current Visit: No   Status: Resolved

## 2021-12-13 NOTE — EMERGENCY DEPARTMENT REPORT
ED Chest Pain HPI





- General


Chief Complaint: Chest Pain


Stated Complaint: CHEST PAIN, WEAKNESS


PUI?: No


Time Seen by Provider: 12/13/21 13:07


Source: patient, RN notes reviewed, old records reviewed


Mode of arrival: Stretcher


Limitations: No Limitations





- History of Present Illness


Initial Comments: 





During the history and physical examination, I am chaperoned by nurse Steele





Cardiology: [facility] cardiology; Dr. Wu


Past medical history: Morbid obesity, diabetes, hypertension, hyperlipidemia, 

congestive heart failure, EF of 25%, AICD in situ, 15 stents, history of 

thrombosis involving cardiac device, formally on anticoagulation, not currently 

on anticoagulation.  Last cardiac catheterization performed June 2019 at 

[facility], shows patent RCA LAD and circumflex stents distal 

vessels diffusely tapered off with small vessel disease.  The RCA shows multiple

patent stents from the ostium extending to the bifurcation distal RCA PDA PLV 

small vessel disease.





The patient is a 46-year-old female whom I have evaluated in the past.  She 

presents to the ER today with a complaint of chest pain.  The chest pain started

this morning, essential, and associated with nausea, diaphoresis, shortness of 

breath, lightheadedness and near syncope.  She states she took her aspirin, 

Plavix and nitroglycerin this morning.  The patient states that she has frequent

and chronic chest pain, but this is worse and more intense than her typical 

chest pain.





She states her pain was previously at 10, and it is now a 9 out of 10.  The 

patient denies headache, neck pain, but does endorse abdominal distention, and 

feels like she is up on her water weight.  She denies the possibility of 

pregnancy, denies travel, surgery, immobilization, oral contraceptive use, 

DVT/PE risk factors.  She endorses that her last cardiac catheterization was 

performed in 2019.








She also reports that she is up-to-date with her COVID-19 vaccination series


MD Complaint: chest pain


-: Gradual, Sudden, hour(s)


Onset: during rest


Pain Location: substernal


Pain Radiation: none


Severity: moderate


Quality: tightness, aching, heaviness


Consistency: constant


Improves With: nitroglycerin, medication-other (Took aspirin and Plavix), rest


Worsens With: other (Physical exertion)


re: nausea, dyspnea, sense of impending doom


Treatments Prior to Arrival: aspirin, nitroglycerin


Aspirin use within the Past 7 Days: (1) Yes





- Related Data


On Oral Contraceptives: No


                                Home Medications











 Medication  Instructions  Recorded  Confirmed  Last Taken


 


Isosorbide Mononitrate [Isosorbide 120 mg PO DAILY 02/05/15 12/24/20 09/20/19





Mononitrate ER (IMDUR)]    


 


Insulin Detemir [Levemir VIAL] 35 unit SQ BID 07/15/20 12/24/20 07/14/20








                                  Previous Rx's











 Medication  Instructions  Recorded  Last Taken  Type


 


Cholecalciferol Vit D3 [Vitamin D3 1,000 unit PO QDAY  tablet 08/30/17 09/15/19 

Rx





1,000 UNIT TAB]    


 


Clopidogrel [Plavix] 75 mg PO QDAY  tablet 08/30/17 09/20/19 Rx


 


Ezetimibe [Zetia] 10 mg PO QDAY  tablet 08/30/17 09/20/19 Rx


 


Metoprolol Xl [Metoprolol 100 mg PO QDAY #30 tablet 04/25/20 Unknown Rx





SUCCINATE ER TAB]    


 


Aspirin EC [Ecotrin] 325 mg PO QDAY #30 tablet 12/24/20 Unknown Rx


 


AtorvaSTATin [Lipitor] 80 mg PO QHS #30 tablet 12/24/20 Unknown Rx


 


Enoxaparin Sodium [Lovenox] 120 mg SQ Q12H 4 Days #8 syringe 12/24/20 Unknown Rx


 


Ferrous Sulfate [Feosol 325 MG tab] 325 mg PO QDAY #30 tablet 12/24/20 Unknown 

Rx


 


Gabapentin 600 mg PO TID #90 capsule 12/24/20 Unknown Rx


 


Insulin Aspart (Nf) [NovoLOG 100 5 unit SQ AC #1 vial 12/24/20 Unknown Rx





UNITS/ML VIAL]    


 


Insulin Glargine [Lantus VIAL] 35 units SUB-Q BID #10 pen 12/24/20 Unknown Rx


 


Levothyroxine [Synthroid] 100 mcg PO QDAY #30 12/24/20 Unknown Rx


 


Nitrofurantoin Mono/M-Cryst 100 mg PO Q12HR #14 capsule 12/24/20 Unknown Rx





[Macrobid CAP]    


 


Nitroglycerin [Nitrostat] 0.4 mg SL Q5M PRN #25 tablet 12/24/20 Unknown Rx


 


Potassium Chloride [K-Dur] 20 meq PO BID #30 tab 12/24/20 Unknown Rx


 


Sertraline [Zoloft] 50 mg PO QDAY #30 tablet 12/24/20 Unknown Rx


 


Spironolactone [Aldactone] 50 mg PO QDAY #30 tablet 12/24/20 Unknown Rx


 


Torsemide [Demadex] 100 mg PO QDAY #30 12/24/20 Unknown Rx


 


Warfarin [Coumadin] 7.5 mg PO DAILY@1700 30 Days #30 12/24/20 Unknown Rx





 tablet   


 


Zolpidem (Nf) [Ambien (Nf)] 10 mg PO QHS #10 12/24/20 Unknown Rx











                                    Allergies











Allergy/AdvReac Type Severity Reaction Status Date / Time


 


adhesive tape Allergy  Rash Verified 12/13/21 12:19


 


bupivacaine Allergy  Angioedema Verified 12/13/21 12:19


 


ketorolac [From Toradol] Allergy  Hives Verified 12/13/21 12:19


 


Sulfa (Sulfonamide Allergy  Nausea Verified 12/13/21 12:19





Antibiotics)     


 


famotidine [From Pepcid] AdvReac  Vomiting Verified 12/13/21 12:19














Heart Score





- HEART Score


History: Highly suspicious


EKG: Non-specific


Age: 45-65


Risk factors: > 3 risk factors or hx of atherosclerotic disease


Troponin: < normal limit


HEART Score: 6





- EKG Read Time


Time EKG Completed: 12:24


EKG Read Time: 12:24





- Critical Actions


Critical Actions: 4-6 pts:12-16.6% risk of adverse cardiac event. Should be 

admitted





ED Review of Systems


ROS: 


Stated complaint: CHEST PAIN, WEAKNESS


Other details as noted in HPI





Constitutional: malaise, weakness.  denies: fever


Eyes: denies: eye discharge


ENT: denies: epistaxis


Respiratory: shortness of breath


Cardiovascular: chest pain


Gastrointestinal: nausea.  denies: vomiting, hematemesis, melena


Genitourinary: denies: dysuria


Musculoskeletal: denies: back pain


Neurological: weakness


Psychiatric: anxiety


Hematological/Lymphatic: denies: easy bleeding





ED Past Medical Hx





- Past Medical History


Hx Hypertension: Yes


Hx Heart Attack/AMI: Yes


Hx Congestive Heart Failure: Yes


Hx Diabetes: Yes


Hx GERD: Yes


Hx Liver Disease: No


Hx Renal Disease: Yes (CKD, stage 2)


Hx Seizures: No


Hx Asthma: No


Hx COPD: No


Hx Tuberculosis: No


Hx HIV: No


Additional medical history: CAD, gatroparesis, hypOthyroidism, retinopathy, 

neuropathy, elevated cholesterol.  15 stents. " BLOOD CLOT IN HEART.'





- Surgical History


Hx Coronary Stent: Yes (15)


Hx Pacemaker: Yes


Hx Internal Defibrillator: Yes (AICD)


Additional Surgical History: tubal ligation, left fallopian tube removed, right 

knee surgery, right arm surgery, left eye surgeryPOWER PORT,Pacemaker  Right 

ring finger surgery.DexCom continueous glucose meter, right abd





- Social History


Smoking Status: Never Smoker





- Medications


Home Medications: 


                                Home Medications











 Medication  Instructions  Recorded  Confirmed  Last Taken  Type


 


Isosorbide Mononitrate [Isosorbide 120 mg PO DAILY 02/05/15 12/24/20 09/20/19 

History





Mononitrate ER (IMDUR)]     


 


Cholecalciferol Vit D3 [Vitamin D3 1,000 unit PO QDAY  tablet 08/30/17 12/24/20 

09/15/19 Rx





1,000 UNIT TAB]     


 


Clopidogrel [Plavix] 75 mg PO QDAY  tablet 08/30/17 12/24/20 09/20/19 Rx


 


Ezetimibe [Zetia] 10 mg PO QDAY  tablet 08/30/17 12/24/20 09/20/19 Rx


 


Metoprolol Xl [Metoprolol 100 mg PO QDAY #30 tablet 04/25/20 12/24/20 Unknown Rx





SUCCINATE ER TAB]     


 


Insulin Detemir [Levemir VIAL] 35 unit SQ BID 07/15/20 12/24/20 07/14/20 History


 


Aspirin EC [Ecotrin] 325 mg PO QDAY #30 tablet 12/24/20  Unknown Rx


 


AtorvaSTATin [Lipitor] 80 mg PO QHS #30 tablet 12/24/20  Unknown Rx


 


Enoxaparin Sodium [Lovenox] 120 mg SQ Q12H 4 Days #8 syringe 12/24/20  Unknown 

Rx


 


Ferrous Sulfate [Feosol 325 MG tab] 325 mg PO QDAY #30 tablet 12/24/20  Unknown 

Rx


 


Gabapentin 600 mg PO TID #90 capsule 12/24/20  Unknown Rx


 


Insulin Aspart (Nf) [NovoLOG 100 5 unit SQ AC #1 vial 12/24/20  Unknown Rx





UNITS/ML VIAL]     


 


Insulin Glargine [Lantus VIAL] 35 units SUB-Q BID #10 pen 12/24/20  Unknown Rx


 


Levothyroxine [Synthroid] 100 mcg PO QDAY #30 12/24/20  Unknown Rx


 


Nitrofurantoin Mono/M-Cryst 100 mg PO Q12HR #14 capsule 12/24/20  Unknown Rx





[Macrobid CAP]     


 


Nitroglycerin [Nitrostat] 0.4 mg SL Q5M PRN #25 tablet 12/24/20  Unknown Rx


 


Potassium Chloride [K-Dur] 20 meq PO BID #30 tab 12/24/20  Unknown Rx


 


Sertraline [Zoloft] 50 mg PO QDAY #30 tablet 12/24/20  Unknown Rx


 


Spironolactone [Aldactone] 50 mg PO QDAY #30 tablet 12/24/20  Unknown Rx


 


Torsemide [Demadex] 100 mg PO QDAY #30 12/24/20  Unknown Rx


 


Warfarin [Coumadin] 7.5 mg PO DAILY@1700 30 Days #30 12/24/20  Unknown Rx





 tablet    


 


Zolpidem (Nf) [Ambien (Nf)] 10 mg PO QHS #10 12/24/20  Unknown Rx














ED Physical Exam





- General


Limitations: No Limitations


General appearance: alert, in no apparent distress, obese





- Head


Head exam: Present: atraumatic, normocephalic





- Eye


Eye exam: Present: normal appearance, EOMI.  Absent: nystagmus





- ENT


ENT exam: Present: normal exam, normal orophraynx, mucous membranes moist, 

normal external ear exam





- Neck


Neck exam: Present: normal inspection, full ROM.  Absent: tenderness, 

meningismus





- Respiratory


Respiratory exam: Present: normal lung sounds bilaterally.  Absent: respiratory 

distress, wheezes, rales, rhonchi, stridor, decreased breath sounds





- Cardiovascular


Cardiovascular Exam: Present: regular rate, normal rhythm, normal heart sounds. 

Absent: bradycardia, tachycardia, irregular rhythm, systolic murmur, diastolic 

murmur, rubs, gallop





- GI/Abdominal


GI/Abdominal exam: Present: soft, other (Abdominal distention is noted.).  

Absent: distended, tenderness, guarding, rebound, rigid, pulsatile mass





- Extremities Exam


Extremities exam: Present: normal inspection, full ROM, other (2+ pulses noted 

in the bilateral upper and lower extremities.  There is no palpable cord.   

negative Homans sign.  Muscular compartments are soft.  The pelvis is stable.). 

Absent: calf tenderness





- Back Exam


Back exam: Present: normal inspection.  Absent: CVA tenderness (R), CVA tende

rness (L), paraspinal tenderness, vertebral tenderness





- Neurological Exam


Neurological exam: Present: alert, oriented X3, other (No facial droop.  Tongue 

midline.  Extraocular movements intact bilaterally.  Facial sensation intact to 

light touch in V1, V2, V3 distribution bilaterally.  5 and a 5 strength in 4 

extremities.  Sensation intact to light touch in 4 extremities.).  Absent: motor

sensory deficit





- Psychiatric


Psychiatric exam: Present: normal affect, normal mood





- Skin


Skin exam: Present: warm, dry, intact, normal color.  Absent: rash





ED Course


                                   Vital Signs











  12/13/21





  12:19


 


Temperature 98.8 F


 


Pulse Rate 77


 


Respiratory 18





Rate 


 


Blood Pressure 133/87





[Left] 


 


O2 Sat by Pulse 100





Oximetry 














- Reevaluation(s)


Reevaluation #1: 





12/13/21 13:27


Differential diagnosis, including but not limited to: Stable angina, unstable 

angina, acute coronary syndrome, GERD, gastritis, hiatal hernia, pneumonia, 

congestive heart failure, anxiety





Assessment and plan: 46-year-old female, who was afebrile, with reassuring vital

 signs, who is not currently tachycardic, tachypneic or hypoxic, denies 

pulmonary embolism and DVT risk factors, low risk by Wells criteria for 

pulmonary embolism, PERC negative, who is moderate to high risk for major 

adverse cardiac event as per heart score, with concerning chest pain.  Her EKG 

is grossly unchanged when compared to prior EKG.





She appears comfortable at this time and is not actively diaphoretic.





Have requested [facility] cardiology consultation.  We will treat her 

symptoms empirically, she has taken her aspirin and Plavix earlier on today, 

obtain x-ray of the chest, and admit patient to the medical service for 

accelerated cardiac risk ratification, and urgent cardiology evaluation.  I 

discussed this plan of care with the patient.  She is agreeable to this plan of 

care.





Reassess after test results have resulted.,  But we anticipate admission to the 

medical service.  I did discuss this with the patient.  She is agreeable to this

 plan of care


12/13/21 14:27


Laboratory studies demonstrate hypokalemia, elevated BNP, and renal 

insufficiency.  The patient is also hyperglycemic.  Given potassium of 3.1, we 

will orally replete potassium, and give torsemide.  Hospital physician, Dr. DAVID Boyce to admit to [facility]





I will defer to the inpatient team to further manage the patient's 

hyperglycemia.





- Consultations


Consultation #1: 





12/13/21 13:39


Discussed patient's history, physical, EKG findings and clinical impression with

 nurse practitioner VIKRAM Bennett, with [facility]





They will follow along in consultation, make further inpatient recommendations





RUBÉN score





- Rubén Score


Age > 65: (0) No


Aspirin use within the Past 7 Days: (1) Yes


3 or more CAD Risk Factors: (1) Yes


2 or more Angina events in past 24 hrs: (1) Yes


Known CAD with more than 50% Stenosis: (1) Yes


Elevated Cardiac Markers: (0) No


ST Deviation Greater than 0.5mm: (0) No


RUBÉN Score: 4





ED Medical Decision Making





- Lab Data


Result diagrams: 


                                 12/13/21 13:33





                                 12/13/21 13:33








                                   Vital Signs











  12/13/21





  12:19


 


Temperature 98.8 F


 


Pulse Rate 77


 


Respiratory 18





Rate 


 


Blood Pressure 133/87





[Left] 


 


O2 Sat by Pulse 100





Oximetry 











                                   Lab Results











  12/13/21 12/13/21 12/13/21 Range/Units





  13:33 13:33 13:33 


 


WBC  6.0    (4.5-11.0)  K/mm3


 


RBC  4.48    (3.65-5.03)  M/mm3


 


Hgb  14.4 H    (10.1-14.3)  gm/dl


 


Hct  44.7 H    (30.3-42.9)  %


 


MCV  100 H    (79-97)  fl


 


MCH  32    (28-32)  pg


 


MCHC  32    (30-34)  %


 


RDW  15.7 H    (13.2-15.2)  %


 


Plt Count  222    (140-440)  K/mm3


 


Lymph % (Auto)  22.0    (13.4-35.0)  %


 


Mono % (Auto)  10.5 H    (0.0-7.3)  %


 


Eos % (Auto)  2.7    (0.0-4.3)  %


 


Baso % (Auto)  0.8    (0.0-1.8)  %


 


Lymph # (Auto)  1.3    (1.2-5.4)  K/mm3


 


Mono # (Auto)  0.6    (0.0-0.8)  K/mm3


 


Eos # (Auto)  0.2    (0.0-0.4)  K/mm3


 


Baso # (Auto)  0.0    (0.0-0.1)  K/mm3


 


Seg Neutrophils %  64.0    (40.0-70.0)  %


 


Seg Neutrophils #  3.9    (1.8-7.7)  K/mm3


 


PT    13.7  (12.2-14.9)  Sec.


 


INR    0.95  (0.87-1.13)  


 


Sodium   135 L   (137-145)  mmol/L


 


Potassium   3.1 L   (3.6-5.0)  mmol/L


 


Chloride   92.6 L   ()  mmol/L


 


Carbon Dioxide   27   (22-30)  mmol/L


 


Anion Gap   19   mmol/L


 


BUN   30 H   (7-17)  mg/dL


 


Creatinine   1.5 H   (0.6-1.2)  mg/dL


 


Estimated GFR   37   ml/min


 


BUN/Creatinine Ratio   20   %


 


Glucose   393 H   ()  mg/dL


 


Calcium   8.7   (8.4-10.2)  mg/dL


 


Magnesium     (1.7-2.3)  mg/dL


 


Total Bilirubin   0.50   (0.1-1.2)  mg/dL


 


AST   21   (5-40)  units/L


 


ALT   17   (7-56)  units/L


 


Alkaline Phosphatase   77   ()  units/L


 


Total Creatine Kinase     ()  units/L


 


Troponin T   < 0.010   (0.00-0.029)  ng/mL


 


NT-Pro-B Natriuret Pep     (0-450)  pg/mL


 


Total Protein   6.7   (6.3-8.2)  g/dL


 


Albumin   3.8 L   (3.9-5)  g/dL


 


Albumin/Globulin Ratio   1.3   %


 


HCG, Quant     (0-4)  mIU/mL














  12/13/21 12/13/21 Range/Units





  13:33 13:33 


 


WBC    (4.5-11.0)  K/mm3


 


RBC    (3.65-5.03)  M/mm3


 


Hgb    (10.1-14.3)  gm/dl


 


Hct    (30.3-42.9)  %


 


MCV    (79-97)  fl


 


MCH    (28-32)  pg


 


MCHC    (30-34)  %


 


RDW    (13.2-15.2)  %


 


Plt Count    (140-440)  K/mm3


 


Lymph % (Auto)    (13.4-35.0)  %


 


Mono % (Auto)    (0.0-7.3)  %


 


Eos % (Auto)    (0.0-4.3)  %


 


Baso % (Auto)    (0.0-1.8)  %


 


Lymph # (Auto)    (1.2-5.4)  K/mm3


 


Mono # (Auto)    (0.0-0.8)  K/mm3


 


Eos # (Auto)    (0.0-0.4)  K/mm3


 


Baso # (Auto)    (0.0-0.1)  K/mm3


 


Seg Neutrophils %    (40.0-70.0)  %


 


Seg Neutrophils #    (1.8-7.7)  K/mm3


 


PT    (12.2-14.9)  Sec.


 


INR    (0.87-1.13)  


 


Sodium    (137-145)  mmol/L


 


Potassium    (3.6-5.0)  mmol/L


 


Chloride    ()  mmol/L


 


Carbon Dioxide    (22-30)  mmol/L


 


Anion Gap    mmol/L


 


BUN    (7-17)  mg/dL


 


Creatinine    (0.6-1.2)  mg/dL


 


Estimated GFR    ml/min


 


BUN/Creatinine Ratio    %


 


Glucose    ()  mg/dL


 


Calcium    (8.4-10.2)  mg/dL


 


Magnesium  1.80   (1.7-2.3)  mg/dL


 


Total Bilirubin    (0.1-1.2)  mg/dL


 


AST    (5-40)  units/L


 


ALT    (7-56)  units/L


 


Alkaline Phosphatase    ()  units/L


 


Total Creatine Kinase  106   ()  units/L


 


Troponin T    (0.00-0.029)  ng/mL


 


NT-Pro-B Natriuret Pep  1883 H   (0-450)  pg/mL


 


Total Protein    (6.3-8.2)  g/dL


 


Albumin    (3.9-5)  g/dL


 


Albumin/Globulin Ratio    %


 


HCG, Quant   < 2  (0-4)  mIU/mL














- EKG Data





12/13/21 13:26


EKG #1 is interpreted at 12: 24





Sinus rhythm, rate 80 bpm.  Normal axis, QTC is 495 ms.  Intraventricular 

conduction delay, poor R wave progression, normal P wave axis.  Motion artifact 

aVF and V3.  This is an abnormal EKG.  This is not a STEMI.  It appears to be 

grossly unchanged when compared to prior EKG from 5/2021





- Radiology Data


Radiology results: pending, report reviewed, image reviewed





CHEST 2 VIEWS  INDICATION / CLINICAL INFORMATION: CHEST PAIN.  COMPARISON: Chest

 x-ray on 5/10/2021  FINDINGS:  SUPPORT DEVICES: Stable position of right IJ 

port and cardiac ICD.  HEART / MEDIASTINUM: Stable borderline cardiomegaly.  

LUNGS / PLEURA: No significant pulmonary or pleural abnormality. No pneumothorax

.  ADDITIONAL FINDINGS: No significant additional findings.  IMPRESSION: 1. No 

acute findings.  Signer Name: Dilshad Dougherty MD Signed: 12/13/2021 11:40 AM


Critical care attestation.: 


If time is entered above; I have spent that time in minutes in the direct care 

of this critically ill patient, excluding procedure time.








ED Disposition


Clinical Impression: 


 CAD (coronary artery disease), Unstable angina, CHF (congestive heart failure),

 Acute chest pain, Near syncope, CKD (chronic kidney disease), Hyperglycemia, 

Hypokalemia





Disposition: 09 ADMITTED AS INPATIENT


Is pt being admited?: Yes


Does the pt Need Aspirin: No (Took aspirin already)


Condition: Stable


Instructions:  Angina, Easy-to-Read, Chest Pain (ED)


Referrals: 


PRIMARY CARE,MD [Primary Care Provider] - 3-5 Days

## 2021-12-14 VITALS — SYSTOLIC BLOOD PRESSURE: 107 MMHG | DIASTOLIC BLOOD PRESSURE: 83 MMHG

## 2021-12-14 LAB
BUN SERPL-MCNC: 32 MG/DL (ref 7–17)
BUN/CREAT SERPL: 21 %
CALCIUM SERPL-MCNC: 8.7 MG/DL (ref 8.4–10.2)
HEMOLYSIS INDEX: 1

## 2021-12-14 RX ADMIN — INSULIN LISPRO SCH UNIT: 100 INJECTION, SOLUTION INTRAVENOUS; SUBCUTANEOUS at 00:14

## 2021-12-14 RX ADMIN — AMIODARONE HYDROCHLORIDE SCH MG: 200 TABLET ORAL at 11:04

## 2021-12-14 RX ADMIN — GABAPENTIN SCH MG: 300 CAPSULE ORAL at 10:10

## 2021-12-14 RX ADMIN — TORSEMIDE SCH MG: 100 TABLET ORAL at 10:09

## 2021-12-14 RX ADMIN — INSULIN LISPRO SCH UNIT: 100 INJECTION, SOLUTION INTRAVENOUS; SUBCUTANEOUS at 08:22

## 2021-12-14 RX ADMIN — GABAPENTIN SCH MG: 300 CAPSULE ORAL at 08:42

## 2021-12-14 NOTE — ELECTROCARDIOGRAPH REPORT
[facility]

                                       

Test Date:    2021               Test Time:    13:23:31

Pat Name:     JULITA LOVE             Department:   

Patient ID:   [id number]          Room:         [facility]

Gender:       F                        Technician:   RADHA

:          1975               Requested By: ED DOC

Order Number: [id number]              Reading MD:   Peter Lim

                                 Measurements

Intervals                              Axis          

Rate:         81                       P:            46

OH:           209                      QRS:          71

QRSD:         127                      T:            -29

QT:           430                                    

QTc:          500                                    

                           Interpretive Statements

Sinus rhythm

Ventricular premature complex

Borderline prolonged OH interval

Probable left atrial enlargement

Nonspecific intraventricular conduction delay

nonspecific st-t

Compared to ECG 05/10/2021 17:14:47

Aberrant conduction of supraventricular beat(s) now present



Sinus bradycardia no longer present

Ventricular-paced complex(es) or rhythm no longer present

Electronically Signed On 2021 10:21:38 EST by Peter Lim

## 2021-12-14 NOTE — PROGRESS NOTE
Assessment and Plan





Patient is a 46-year-old female with a past medical history of HFrEF s/p AICD, 

coronary artery disease s/p multiple PCI, ischemic cardiomyopathy, CKD, 

hypertension, morbid obesity, chronic pain syndrome, ICD lead thrombus 

(previously on Coumadin), hypothyroidism who presents to the ED with a complaint

of chest pain which started morning of admission





Echo 5/13/2021-LVEF 15 - 20%. LV size is severely enlarged. Severe global LV 

hypokinesis. IV Lumason contrast was given. Grade III (severe) diastolic 

dysfunction. Elevated left atrial pressure RV not well visualized. Abnormal RV 

diastolic function. Pacemaker or ICD lead seen in the RV LA Volume Index s 

46.53, moderately dilated Tricuspid aortic valve. Mild aortic valve sclerosis. 

Mild mitral regurgitation. Mild tricuspid regurgitation. TR pk grad 42.3 mmHg TR

Vmax 3.05 m/s IVC diam 2.39 cm RV systolic pressure is moderately elevated. Mild

to moderate pulmonary hypertension. RA pressure 8 mmHg RVSP (TR) 50.3 mmHg


Cardiac PET stress test 5/20/2020-large 20% defect of the basal to apical 

anterior and mid anterior lateral walls consistent with myocardial infarction 

with some degree of noel-infarct ischemia.  There is a second large 20% defect 

over the basal to apical inferior lateral and apical lateral walls consistent 

with myocardial infarction.  Lastly there is a small 8% mid septal wall defect 

consistent with myocardial ischemia.  When compared to prior studies only the 

small degree of septal ischemia is new.  EF 26%.


Cardiac cath 6/20/2019-multivessel coronary artery disease with stents present 

in the RCA, LAD and circumflex.  LAD showed multiple patent stent present in 

proximal and mid segment.  After the stent the vessel tapers to a small caliber 

severely diffuse diseased vessels.  Left circumflex shows multiple patent stent 

in the proximal and mid segments.  Distal vessels in the obtuse marginal vessels

are small vessels that show severe diffuse disease throughout.  Right coronary 

artery shows multiple patent stents from the ostium extending to the bifurcation

of the distal RCA.  The PDA and PLV are small vessels with severe diffuse 

disease throughout.  Severe ischemic cardiomyopathy EF 10 to 15%








Atypical Chest pain


Chronic HFrEF-on Torsemide 100mg daily as an outpatient 


ICMP s/p AICD, CardioMEMS


ICD Lead Thrombus Previously on Coumadin for thrombus on lead since 11/2020


H/o MI/CAD s/p PCI 2017 -h/o multiple stents & in-stent restenoses, 

severesmall vessel diseasenot amenable to interventionon cath 6/2019


Chronic Stable Angina


H/o Frequent VT/ICD Discharge - on PO [id number] daily as an outpatient


RACHEL onCKD


HTN


IDDM/Neuropathy


Hypothyroidism- on Synthroid


Morbid Obesity


Chronic Pain Syndrome


Anxiety/Depression


H/o COVID-19 Infection 1/2021


Noncompliance








Plan:





EKG shows sinus rhythm rate 81.  No acute ischemic changes troponins negative 

x4. AMI ruled


Resume outpatient medications: Imdur 120 mg p.o. daily, metolazone 5 mg p.o. 

every 48 hours, torsemide 100 mg p.o. daily, amiodarone 100 mg p.o. daily, 

Plavix, spironolactone 50 mg p.o. daily.


Patient is not on beta-blocker as an outpatient due to previous history of 

bradycardia.  Patient is not on ACE or ARB as an outpatient due to soft blood 

pressures.  As per documentation will defer her heart failure clinic as an 

outpatient for initiation of ACE or ARB


Patient has had extensive cardiac work-up in the past with no further 

intervention indicated.  Medical management recommended.


Patient may be discharged from a cardiac perspective.








Patient should follow-up with her primary cardiologist in 1 to 2 weeks following

discharge


Patient seen in conjunction with Dr. Lim who agrees with this plan of care





- Patient Problems


(1) Noncompliance


Current Visit: Yes   Status: Acute   





(2) Acute chest pain


Current Visit: Yes   Status: Acute   





(3) CAD (coronary artery disease)


Current Visit: Yes   Status: Chronic   


Qualifiers: 


   Coronary Disease-Associated Artery/Lesion type: native artery   Native vs. 

transplanted heart: native heart 





(4) CKD (chronic kidney disease)


Current Visit: Yes   Status: Chronic   


Qualifiers: 


 





(5) Hyperglycemia


Current Visit: Yes   Status: Chronic   





(6) Hx of heart artery stent


Current Visit: No   Status: Chronic   





(7) AICD (automatic cardioverter/defibrillator) present


Current Visit: No   Status: Chronic   





(8) Back pain


Current Visit: No   Status: Chronic   





(9) Depression


Onset Date: 11/14/15   Current Visit: No   Status: Chronic   





(10) Hyperlipidemia


Current Visit: No   Status: Chronic   


Qualifiers: 


 





(11) Hypertension


Current Visit: No   Status: Chronic   





(12) Hypothyroidism


Current Visit: No   Status: Chronic   





(13) Ischemic cardiomyopathy


Current Visit: No   Status: Chronic   





Subjective


Date of service: 12/14/21


Principal diagnosis: atypical chest pain


Interval history: 





Patient sitting in bed. In no acute distress


Sinus 80s on monitor








Objective


                                   Vital Signs











  Temp Pulse Resp BP BP Pulse Ox


 


 12/14/21 11:05   87   107/83  


 


 12/14/21 11:04   87   107/83  


 


 12/14/21 07:15   80  18  100/59   94


 


 12/14/21 07:01   81  14  100/59   81 L


 


 12/14/21 06:45   70  23  100/59   99


 


 12/14/21 06:31   64  19  100/59   100


 


 12/14/21 06:15   81  17  101/65   100


 


 12/14/21 06:01   84  19  101/65   100


 


 12/14/21 05:51     101/65   86


 


 12/14/21 05:31   78  14  101/65   100


 


 12/14/21 05:15   76  14  101/65   99


 


 12/14/21 05:01   76  17  101/65   97


 


 12/14/21 04:45   74  15  101/65   100


 


 12/14/21 04:31   71  13  101/65   100


 


 12/14/21 04:15   80  19  101/65   95


 


 12/14/21 04:01   75  18  94/69  


 


 12/14/21 03:45   89  17  94/69   98


 


 12/14/21 03:31   79  20  94/69   100


 


 12/14/21 03:15   80  29 H  94/69   92


 


 12/14/21 03:01   78  17  94/69   93


 


 12/14/21 02:45   73  14  94/69   93


 


 12/14/21 02:31   75  17  94/69   100


 


 12/14/21 02:15   79  13  94/69   99


 


 12/14/21 02:01   81  13  94/69   100


 


 12/14/21 01:45   95 H  14  94/69   95


 


 12/14/21 01:31   79  12  94/69   84


 


 12/14/21 01:15   78  12  94/69   86


 


 12/14/21 01:01   79  13  94/69   84


 


 12/14/21 00:45   80  13  94/69   83 L


 


 12/14/21 00:31   92 H  19  94/69   92


 


 12/14/21 00:15    13   


 


 12/14/21 00:01   71  14  103/71   90


 


 12/13/21 23:31   70  14  103/71   96


 


 12/13/21 23:20  98.7 F  70  15   103/71  96


 


 12/13/21 23:16    16    92


 


 12/13/21 20:51    13   


 


 12/13/21 20:21    19   


 


 12/13/21 19:51  98.8 F  70  15   109/70  95


 


 12/13/21 15:50   73  20   100/55  95














- Physical Examination


General: No Apparent Distress


HEENT: Positive: PERRL


Neck: Positive: trachea midline


Cardiac: Positive: Reg Rate and Rhythm


Lungs: Positive: clear to auscultation, Normal Breath Sounds


Neuro: Positive: Grossly Intact


Abdomen: Positive: Soft


Extremities: Present: upper extr. pulses.  Absent: edema





- Labs and Meds


                                 Cardiac Enzymes











  12/13/21 Range/Units





  13:33 


 


AST  21  (5-40)  units/L








                                   Coagulation











  12/13/21 Range/Units





  13:33 


 


PT  13.7  (12.2-14.9)  Sec.


 


INR  0.95  (0.87-1.13)  








                                       CBC











  12/13/21 Range/Units





  13:33 


 


WBC  6.0  (4.5-11.0)  K/mm3


 


RBC  4.48  (3.65-5.03)  M/mm3


 


Hgb  14.4 H  (10.1-14.3)  gm/dl


 


Hct  44.7 H  (30.3-42.9)  %


 


Plt Count  222  (140-440)  K/mm3


 


Lymph # (Auto)  1.3  (1.2-5.4)  K/mm3


 


Mono # (Auto)  0.6  (0.0-0.8)  K/mm3


 


Eos # (Auto)  0.2  (0.0-0.4)  K/mm3


 


Baso # (Auto)  0.0  (0.0-0.1)  K/mm3








                          Comprehensive Metabolic Panel











  12/13/21 12/14/21 Range/Units





  13:33 03:22 


 


Sodium  135 L  138  (137-145)  mmol/L


 


Potassium  3.1 L  3.6  (3.6-5.0)  mmol/L


 


Chloride  92.6 L  93.3 L  ()  mmol/L


 


Carbon Dioxide  27  36 H D  (22-30)  mmol/L


 


BUN  30 H  32 H  (7-17)  mg/dL


 


Creatinine  1.5 H  1.5 H  (0.6-1.2)  mg/dL


 


Glucose  393 H  233 H  ()  mg/dL


 


Calcium  8.7  8.7  (8.4-10.2)  mg/dL


 


AST  21   (5-40)  units/L


 


ALT  17   (7-56)  units/L


 


Alkaline Phosphatase  77   ()  units/L


 


Total Protein  6.7   (6.3-8.2)  g/dL


 


Albumin  3.8 L   (3.9-5)  g/dL














- Imaging and Cardiology


EKG: report reviewed (Normal sinus rhythm no acute ST-T wave changes couple of 

PVCs)


Echo: report reviewed


Cardiac cath: report reviewed





- Telemetry


EKG Rhythm: Sinus Rhythm





- EKG


Sinus rhythms and dysrhythmias: sinus rhythm


Myocardial infarction: anterior MI (old age or i

## 2021-12-14 NOTE — HISTORY AND PHYSICAL REPORT
History of Present Illness


Date of examination: 12/13/21


Date of admission: 


12/13/21 14:28





Chief complaint: 





Chest pain since a.m.


History of present illness: 


46-year-old female with history of coronary artery disease and multiple stents 

comes for chest pain since a.m. associated with nausea diaphoresis and shortness

of breath and near syncope and lightheadedness.  She is has been having frequent

and intermittent chest pain for the last couple of weeks.  Patient says that her

chest pain is about 9 on a scale of 1-10.  No exacerbating or relieving factors.

 No shortness of breath.


Patient has a history of congestive heart failure with ejection fraction of 25%,

AICD in C2 and 15 stents and left heart cardiac cath in June 2019 showing patent

RCA, LAD and circumflex stents with small vessel disease..  The right coronary 

artery apparently shows multiple patent stents from the ostium extending to the 

bifurcation of distal RCA PDA PLV small vessel disease.


Chest pain is about 9 on a scale of 1-10.  No exacerbating or relieving factors.











Heart Score





- HEART Score


History: Highly suspicious


EKG: Non-specific


Age: 45-65


Risk factors: > 3 risk factors or hx of atherosclerotic disease


Troponin: < normal limit


HEART Score: 6





- EKG Read Time


Time EKG Completed: 12:24


EKG Read Time: 12:24





- Critical Actions


Critical Actions: 4-6 pts:12-16.6% risk of adverse cardiac event. Should be 

admitted











- Past Medical History


--Hypertension: Yes


--Heart Attack/AMI: Yes


--Congestive Heart Failure: Yes


--Diabetes: Yes


--GERD: Yes


--Renal Disease: Yes (CKD, stage 2)


--Additional medical history: CAD, gatroparesis, hypOthyroidism, retinopathy, 

neuropathy, elevated cholesterol.  15 stents. " BLOOD CLOT IN HEART.'





- Surgical History


--Coronary Stent: Yes (15)


--Pacemaker: Yes


--Internal Defibrillator: Yes (AICD)


--Additional Surgical History: tubal ligation, left fallopian tube removed, 

right knee surgery, right arm surgery, left eye surgeryPOWER PORT,Pacemaker  

Right ring finger surgery.DexCom continueous glucose meter, right abd





- Social History 


Smoking Status: Never Smoker





-Family history


--coronary artery disease and hypertension








Review of Systems


ROS: 


Stated complaint: CHEST PAIN, WEAKNESS


Other details as noted in HPI





Constitutional: malaise, weakness.  denies: fever


Eyes: denies: eye discharge


ENT: denies: epistaxis


Respiratory: shortness of breath


Cardiovascular: chest pain


Gastrointestinal: nausea.  denies: vomiting, hematemesis, melena


Genitourinary: denies: dysuria


Musculoskeletal: denies: back pain


Neurological: weakness


Psychiatric: anxiety


Hematological/Lymphatic: denies: easy bleeding























Past History


Past Medical History: acute MI, CAD, diabetes, hypertension, hyperlipidemia, 

other (ischemic cariodmyopathy, CKD)


Social history: denies: smoking, alcohol abuse


Family history: CAD, diabetes, hypertension





Medications and Allergies


                                    Allergies











Allergy/AdvReac Type Severity Reaction Status Date / Time


 


adhesive tape Allergy  Rash Verified 12/13/21 12:19


 


bupivacaine Allergy  Angioedema Verified 12/13/21 12:19


 


ketorolac [From Toradol] Allergy  Hives Verified 12/13/21 12:19


 


Sulfa (Sulfonamide Allergy  Nausea Verified 12/13/21 12:19





Antibiotics)     


 


famotidine [From Pepcid] AdvReac  Vomiting Verified 12/13/21 12:19











                                Home Medications











 Medication  Instructions  Recorded  Confirmed  Last Taken  Type


 


Isosorbide Mononitrate [Isosorbide 120 mg PO DAILY 02/05/15 12/13/21 12/13/21 

06:00 History





Mononitrate ER (IMDUR)]     


 


Cholecalciferol Vit D3 [Vitamin D3 1,000 unit PO QDAY  tablet 08/30/17 12/13/21 12/13/21 09:00 Rx





1,000 UNIT TAB]     


 


Clopidogrel [Plavix] 75 mg PO QDAY  tablet 08/30/17 12/13/21 12/13/21 06:00 Rx


 


Ezetimibe [Zetia] 10 mg PO QDAY  tablet 08/30/17 12/13/21 12/12/21 21:00 Rx


 


Metoprolol Xl [Metoprolol 100 mg PO QDAY #30 tablet 04/25/20 12/13/21 Unknown Rx





SUCCINATE ER TAB]     


 


Insulin Detemir [Levemir VIAL] 35 unit SQ BID 07/15/20 12/13/21 12/12/21 21:00 

History


 


Aspirin EC [Ecotrin] 325 mg PO QDAY #30 tablet 12/24/20 12/13/21 12/13/21 06:00 

Rx


 


AtorvaSTATin [Lipitor] 80 mg PO QHS #30 tablet 12/24/20 12/13/21 12/12/21 21:00 

Rx


 


Enoxaparin Sodium [Lovenox] 120 mg SQ Q12H 4 Days #8 syringe 12/24/20 12/13/21 

Unknown Rx


 


Ferrous Sulfate [Feosol 325 MG tab] 325 mg PO QDAY #30 tablet 12/24/20 12/13/21 12/13/21 09:00 Rx


 


Gabapentin 600 mg PO TID #90 capsule 12/24/20 12/13/21 12/13/21 06:00 Rx


 


Insulin Aspart (Nf) [NovoLOG 100 5 unit SQ AC #1 vial 12/24/20 12/13/21 12/13/21

 06:00 Rx





UNITS/ML VIAL]     


 


Insulin Glargine [Lantus VIAL] 35 units SUB-Q BID #10 pen 12/24/20 12/13/21 

Unknown Rx


 


Levothyroxine [Synthroid] 100 mcg PO QDAY #30 12/24/20 12/13/21 12/13/21 06:00 

Rx


 


Nitrofurantoin Mono/M-Cryst 100 mg PO Q12HR #14 capsule 12/24/20 12/13/21 

Unknown Rx





[Macrobid CAP]     


 


Nitroglycerin [Nitrostat] 0.4 mg SL Q5M PRN #25 tablet 12/24/20 12/13/21 Unknown

 Rx


 


Potassium Chloride [K-Dur] 20 meq PO BID #30 tab 12/24/20 12/13/21 12/13/21 

06:00 Rx


 


Sertraline [Zoloft] 50 mg PO QDAY #30 tablet 12/24/20 12/13/21 12/13/21 06:00 Rx


 


Spironolactone [Aldactone] 50 mg PO QDAY #30 tablet 12/24/20 12/13/21 12/13/21 

06:00 Rx


 


Torsemide [Demadex] 100 mg PO QDAY #30 12/24/20 12/13/21 1 Day Ago Rx





    ~12/12/21 


 


Warfarin [Coumadin] 7.5 mg PO DAILY@1700 30 Days #30 12/24/20 12/13/21 Unknown 

Rx





 tablet    


 


Zolpidem (Nf) [Ambien (Nf)] 10 mg PO QHS #10 12/24/20 12/13/21 1 Day Ago Rx





    ~12/12/21 











Active Meds: 


Active Medications





Amiodarone HCl (Amiodarone 200 Mg Tab)  100 mg PO DAILY [facility]


   Last Admin: 12/13/21 19:04 Dose:  100 mg


   Documented by: 


Clopidogrel Bisulfate (Clopidogrel 75 Mg Tab)  75 mg PO QDAY [facility]


Diphenhydramine HCl (Diphenhydramine 50 Mg/Ml Vial)  50 mg IV Q6H [facility]


   Last Admin: 12/13/21 20:09 Dose:  50 mg


   Documented by: 


Insulin Human Lispro (Insulin Lispro 100 Unit/Ml)  0 unit SUB-Q ACHS [facility]; 

Protocol


   Last Admin: 12/14/21 00:14 Dose:  6 unit


   Documented by: 


Isosorbide Mononitrate (Isosorbide Mononitrate Er 60 Mg Tab)  120 mg PO QDAY [facility]


Metolazone (Metolazone 5 Mg Tab)  5 mg PO Q48HR [facility]


   Last Admin: 12/13/21 19:04 Dose:  5 mg


   Documented by: 


Nitroglycerin (Nitroglycerin 0.4 Mg Tab Subl)  0.4 mg SL .Q5MIN PRN


   PRN Reason: Chest Pain


Spironolactone (Spironolactone 50 Mg Tab)  50 mg PO QDAY [facility]


Torsemide (Torsemide 100 Mg Tab)  100 mg PO QDAY [facility]


   Last Admin: 12/13/21 15:08 Dose:  100 mg


   Documented by: 


Zolpidem Tartrate (Zolpidem 5 Mg Tab)  10 mg PO QHS PRN


   PRN Reason: Sleep


   Last Admin: 12/14/21 00:17 Dose:  10 mg


   Documented by: 











Exam





- Constitutional


Vitals: 


                                        











Temp Pulse Resp BP Pulse Ox


 


 98.7 F   70   13   103/71   96 


 


 12/13/21 23:20  12/13/21 23:20  12/14/21 00:15  12/13/21 23:20  12/13/21 23:20











General appearance: Present: no acute distress, well-nourished





- EENT


Eyes: Present: PERRL


ENT: hearing intact, clear oral mucosa





- Neck


Neck: Present: supple, normal ROM





- Respiratory


Respiratory effort: normal


Respiratory: bilateral: CTA





- Cardiovascular


Heart rate: 78


Rhythm: regular


Heart Sounds: Present: S1 & S2.  Absent: rub, click





- Extremities


Extremities: no ischemia, pulses intact, pulses symmetrical, No edema


Peripheral Pulses: within normal limits





- Abdominal


General gastrointestinal: Present: soft, non-tender, non-distended, normal bowel

sounds


Female genitourinary: Present: normal





- Integumentary


Integumentary: Present: clear, warm, dry





- Musculoskeletal


Musculoskeletal: gait normal, strength equal bilaterally





- Psychiatric


Psychiatric: appropriate mood/affect, intact judgment & insight





- Neurologic


Neurologic: CNII-XII intact, moves all extremities





HEART Score





- HEART Score


EKG: Non-specific


Age: 45-65


Risk factors: > 3 risk factors or hx of atherosclerotic disease


Troponin: 


                                        











Troponin T  < 0.010 ng/mL (0.00-0.029)   12/13/21  19:16    











Troponin: < normal limit





- Critical Actions


Critical Actions: 4-6 pts:12-16.6% risk of adverse cardiac event. Should be 

admitted





Results





- Labs


CBC & Chem 7: 


                                 12/13/21 13:33





                                 12/14/21 03:22


Labs: 


                             Laboratory Last Values











WBC  6.0 K/mm3 (4.5-11.0)   12/13/21  13:33    


 


RBC  4.48 M/mm3 (3.65-5.03)   12/13/21  13:33    


 


Hgb  14.4 gm/dl (10.1-14.3)  H  12/13/21  13:33    


 


Hct  44.7 % (30.3-42.9)  H  12/13/21  13:33    


 


MCV  100 fl (79-97)  H  12/13/21  13:33    


 


MCH  32 pg (28-32)   12/13/21  13:33    


 


MCHC  32 % (30-34)   12/13/21  13:33    


 


RDW  15.7 % (13.2-15.2)  H  12/13/21  13:33    


 


Plt Count  222 K/mm3 (140-440)   12/13/21  13:33    


 


Lymph % (Auto)  22.0 % (13.4-35.0)   12/13/21  13:33    


 


Mono % (Auto)  10.5 % (0.0-7.3)  H  12/13/21  13:33    


 


Eos % (Auto)  2.7 % (0.0-4.3)   12/13/21  13:33    


 


Baso % (Auto)  0.8 % (0.0-1.8)   12/13/21  13:33    


 


Lymph # (Auto)  1.3 K/mm3 (1.2-5.4)   12/13/21  13:33    


 


Mono # (Auto)  0.6 K/mm3 (0.0-0.8)   12/13/21  13:33    


 


Eos # (Auto)  0.2 K/mm3 (0.0-0.4)   12/13/21  13:33    


 


Baso # (Auto)  0.0 K/mm3 (0.0-0.1)   12/13/21  13:33    


 


Seg Neutrophils %  64.0 % (40.0-70.0)   12/13/21  13:33    


 


Seg Neutrophils #  3.9 K/mm3 (1.8-7.7)   12/13/21  13:33    


 


PT  13.7 Sec. (12.2-14.9)   12/13/21  13:33    


 


INR  0.95  (0.87-1.13)   12/13/21  13:33    


 


Sodium  135 mmol/L (137-145)  L  12/13/21  13:33    


 


Potassium  3.1 mmol/L (3.6-5.0)  L  12/13/21  13:33    


 


Chloride  92.6 mmol/L ()  L  12/13/21  13:33    


 


Carbon Dioxide  27 mmol/L (22-30)   12/13/21  13:33    


 


Anion Gap  19 mmol/L  12/13/21  13:33    


 


BUN  30 mg/dL (7-17)  H  12/13/21  13:33    


 


Creatinine  1.5 mg/dL (0.6-1.2)  H  12/13/21  13:33    


 


Estimated GFR  37 ml/min  12/13/21  13:33    


 


BUN/Creatinine Ratio  20 %  12/13/21  13:33    


 


Glucose  393 mg/dL ()  H  12/13/21  13:33    


 


POC Glucose  384 mg/dL ()  H  12/13/21  21:55    


 


Calcium  8.7 mg/dL (8.4-10.2)   12/13/21  13:33    


 


Magnesium  1.80 mg/dL (1.7-2.3)   12/13/21  13:33    


 


Total Bilirubin  0.50 mg/dL (0.1-1.2)   12/13/21  13:33    


 


AST  21 units/L (5-40)   12/13/21  13:33    


 


ALT  17 units/L (7-56)   12/13/21  13:33    


 


Alkaline Phosphatase  77 units/L ()   12/13/21  13:33    


 


Total Creatine Kinase  106 units/L ()   12/13/21  13:33    


 


Troponin T  < 0.010 ng/mL (0.00-0.029)   12/13/21  19:16    


 


NT-Pro-B Natriuret Pep  1883 pg/mL (0-450)  H  12/13/21  13:33    


 


Total Protein  6.7 g/dL (6.3-8.2)   12/13/21  13:33    


 


Albumin  3.8 g/dL (3.9-5)  L  12/13/21  13:33    


 


Albumin/Globulin Ratio  1.3 %  12/13/21  13:33    


 


HCG, Quant  < 2 mIU/mL (0-4)   12/13/21  13:33    








                                    Short CBC











  12/13/21 Range/Units





  13:33 


 


WBC  6.0  (4.5-11.0)  K/mm3


 


Hgb  14.4 H  (10.1-14.3)  gm/dl


 


Hct  44.7 H  (30.3-42.9)  %


 


Plt Count  222  (140-440)  K/mm3








                                       BMP











  12/13/21 12/14/21





  13:33 03:22


 


Sodium  135 L  138


 


Potassium  3.1 L  3.6


 


Chloride  92.6 L  93.3 L


 


Carbon Dioxide  27  36 H D


 


BUN  30 H  32 H


 


Creatinine  1.5 H  1.5 H


 


Glucose  393 H  233 H


 


Calcium  8.7  8.7








                                 Cardiac Enzymes











  12/13/21 12/13/21 12/13/21 Range/Units





  13:33 13:33 15:05 


 


Total Creatine Kinase   106   ()  units/L


 


Troponin T  < 0.010   < 0.010  (0.00-0.029)  ng/mL














  12/13/21 12/14/21 12/14/21 Range/Units





  19:16 03:22 08:04 


 


Total Creatine Kinase     ()  units/L


 


Troponin T  < 0.010  < 0.010  < 0.010  (0.00-0.029)  ng/mL








                                 Liver Function











  12/13/21 Range/Units





  13:33 


 


Total Bilirubin  0.50  (0.1-1.2)  mg/dL


 


AST  21  (5-40)  units/L


 


ALT  17  (7-56)  units/L


 


Alkaline Phosphatase  77  ()  units/L


 


Albumin  3.8 L  (3.9-5)  g/dL














- Imaging and Cardiology


EKG: report reviewed (Normal sinus rhythm no acute ST-T wave changes couple of 

PVCs)


Chest x-ray: report reviewed


Imaging and Cardiology: 





Chest x-ray


No acute findings





Assessment and Plan


Advance Directives: Yes (Full code)


VTE prophylaxis?: Chemical


Plan of care discussed with patient/family: Yes





- Patient Problems


(1) Acute coronary syndrome


Current Visit: Yes   Status: Acute   


Plan to address problem: 


Serial troponins


If negative patient to be discharged


Extensive work-up done till recently


Recent stress test negative


Done at [place]


Reviewed by [facility]








(2) CAD (coronary artery disease)


Current Visit: Yes   Status: Chronic   


Qualifiers: 


   Coronary Disease-Associated Artery/Lesion type: native artery   Native vs. 

transplanted heart: native heart 


Plan to address problem: 


Multiple stents


Continue aspirin and isosorbide mononitrate


Continue Plavix








(3) Hyperlipidemia


Current Visit: Yes   Status: Chronic   


Qualifiers: 


   Hyperlipidemia type: mixed hyperlipidemia   Qualified Code(s): E78.2 - Mixed 

hyperlipidemia   


Plan to address problem: 


Continue statins








(4) Hx of heart artery stent


Current Visit: No   Status: Chronic   


Plan to address problem: 


Multiple stents in RCA


Apparently had 15 stents


Recent cardiac cath showed small vessel disease but patent stents--to be 

medically managed








(5) Anticoagulation management encounter


Current Visit: Yes   Status: Chronic   


Plan to address problem: 


Patient on Plavix and not on Lovenox or Coumadin








(6) DVT prophylaxis


Current Visit: No   Status: Acute   





(7) IDDM (insulin dependent diabetes mellitus)


Current Visit: Yes   Status: Chronic   


Plan to address problem: 


Continue Levemir and Accu-Cheks before meals and at bedtime and moderate dose 

protocol


Hemoglobin A1c








(8) Hyperlipidemia


Current Visit: Yes   Status: Chronic   


Qualifiers: 


   Hyperlipidemia type: mixed hyperlipidemia   Qualified Code(s): E78.2 - Mixed 

hyperlipidemia   


Plan to address problem: 


Continue statins








(9) Discharge planning issues


Current Visit: Yes   Status: Acute   


Plan to address problem: 


Patient may be discharged tomorrow if the troponins are negative


Patient had extensive cardiology work-up


Cardiology consult appreciated








(10) DVT prophylaxis


Current Visit: No   Status: Acute   


Plan to address problem: 


On heparin and GI prophylaxis

## 2021-12-14 NOTE — ELECTROCARDIOGRAPH REPORT
[facility]

                                       

Test Date:    2021               Test Time:    12:24:44

Pat Name:     JULITA LOVE             Department:   

Patient ID:   [id number]          Room:         [facility]

Gender:       F                        Technician:   EMI

:          1975               Requested By: ED DOC

Order Number: [id number]              Reading MD:   Peter Lim

                                 Measurements

Intervals                              Axis          

Rate:         80                       P:            51

NE:           209                      QRS:          59

QRSD:         134                      T:            -18

QT:           430                                    

QTc:          495                                    

                           Interpretive Statements

Sinus rhythm

Ventricular premature complex

Borderline prolonged NE interval

Left atrial enlargement

poor r wave progression

nonspecific st-t

Compared to ECG 05/10/2021 17:14:47



Sinus bradycardia no longer present

Ventricular-paced complex(es) or rhythm no longer present

Intraventricular conduction delay no longer present

Electronically Signed On 2021 10:19:33 EST by Peter Lim

## 2021-12-14 NOTE — DISCHARGE SUMMARY
Providers





- Providers


Date of Admission: 


12/13/21 14:28





Date of discharge: 12/14/21


Attending physician: 


AMY J KOCHERLA





                                        





12/13/21 13:19


Consult to Physician [CONS] Urgent 


   Comment: 


   Consulting Provider: BRENT AGUILA


   Physician Instructions: 


   Reason For Exam: acute chest pain











Primary care physician: 


PRIMARY CARE MD








Hospitalization


Condition: Stable


Hospital course: 








Ischemic cardiomyopathy status post AICD


ICD lead thrombus previously on Coumadin, discharged on Eliquis


History of coronary artery disease s/p PCI multiple stents


Acute on CKD


Type 2 diabetes mellitus


Hypertension


Hypothyroidism


Chronic pain syndrome


Anxiety disorder


History of COVID-19 January 2021


Medical noncompliance





Disposition: 01 HOME / SELF CARE / HOMELESS


Final Discharge Diagnosis (Prints w/discharge instructions): Ischemic 

cardiomyopathy status post AICD.  ICD lead thrombus previously on Coumadin, 

discharged on Eliquis.  History of coronary artery disease s/p PCI multiple 

stents.  Acute on CKD.  Type 2 diabetes mellitus.  Hypertension.  

Hypothyroidism.  Chronic pain syndrome.  Anxiety disorder.  History of COVID-19 January 2021.  Medical noncompliance


Time spent for discharge: 35 min





Core Measure Documentation





- Palliative Care


Palliative Care/ Comfort Measures: Not Applicable





- Core Measures


Any of the following diagnoses?: none





Exam





- Constitutional


Vitals: 


                                        











Temp Pulse Resp BP Pulse Ox


 


 98.7 F   87   18   107/83   94 


 


 12/13/21 23:20  12/14/21 11:05  12/14/21 07:15  12/14/21 11:05  12/14/21 07:15














Plan


Activity: no restrictions


Diet: other (Cardiac diet)


Additional Instructions: If you have worsening symptoms contact MD or go to the 

emergency room.  Advised to comply with medications diet and follow-up visits.  

Advised to follow with primary care physician and private cardiologist per jorge herring


Follow up with: 


PRIMARY CARE,MD [Primary Care Provider] - 3-5 Days


BRENT AGUILA MD [Staff Physician] - 14 Days


Prescriptions: 


Apixaban [Eliquis] 5 mg PO BID #60 tablet

## 2021-12-14 NOTE — PROGRESS NOTE
Assessment and Plan


Assessment and plan: 


(1) Acute coronary syndrome


Current Visit: Yes   Status: Acute   


Plan to address problem: 


Serial troponins


If negative patient to be discharged


Extensive work-up done till recently


Recent stress test negative


Done at [place]


Reviewed by [facility]








(2) CAD (coronary artery disease)


Current Visit: Yes   Status: Chronic   


Qualifiers: 


   Coronary Disease-Associated Artery/Lesion type: native artery   Native vs. 

transplanted heart: native heart 


Plan to address problem: 


Multiple stents


Continue aspirin and isosorbide mononitrate








(3) Hyperlipidemia


Current Visit: Yes   Status: Chronic   


Qualifiers: 


   Hyperlipidemia type: mixed hyperlipidemia   Qualified Code(s): E78.2 - Mixed 

hyperlipidemia   


Plan to address problem: 


Continue statins








(4) Hx of heart artery stent


Current Visit: No   Status: Acute   





(5) Anticoagulation management encounter


Current Visit: Yes   Status: Chronic   


Plan to address problem: 


Patient on Plavix and not on Lovenox or Coumadin








(6) DVT prophylaxis


Current Visit: No   Status: Acute   





(7) IDDM (insulin dependent diabetes mellitus)


Current Visit: Yes   Status: Chronic   


Plan to address problem: 


Continue Levemir and Accu-Cheks before meals and at bedtime and moderate dose 

protocol


Hemoglobin A1c








(8) Discharge planning issues


Current Visit: Yes   Status: Acute   


Plan to address problem: 


Patient may be discharged tomorrow if the troponins are negative


Patient had extensive cardiology work-up


Cardiology consult appreciated














Hospitalist Physical





- Constitutional


Vitals: 


                                        











Temp Pulse Resp BP Pulse Ox


 


 98.7 F   80   18   100/59   94 


 


 12/13/21 23:20  12/14/21 07:15  12/14/21 07:15  12/14/21 07:15  12/14/21 07:15











General appearance: Present: no acute distress





HEART Score





- HEART Score


EKG: Non-specific


Age: 45-65


Risk factors: > 3 risk factors or hx of atherosclerotic disease


Troponin: 


                                        











Troponin T  < 0.010 ng/mL (0.00-0.029)   12/14/21  03:22    











Troponin: < normal limit





- Critical Actions


Critical Actions: 4-6 pts:12-16.6% risk of adverse cardiac event. Should be 

admitted





Results





- Labs


CBC & Chem 7: 


                                 12/13/21 13:33





                                 12/14/21 03:22


Labs: 


                             Laboratory Last Values











WBC  6.0 K/mm3 (4.5-11.0)   12/13/21  13:33    


 


RBC  4.48 M/mm3 (3.65-5.03)   12/13/21  13:33    


 


Hgb  14.4 gm/dl (10.1-14.3)  H  12/13/21  13:33    


 


Hct  44.7 % (30.3-42.9)  H  12/13/21  13:33    


 


MCV  100 fl (79-97)  H  12/13/21  13:33    


 


MCH  32 pg (28-32)   12/13/21  13:33    


 


MCHC  32 % (30-34)   12/13/21  13:33    


 


RDW  15.7 % (13.2-15.2)  H  12/13/21  13:33    


 


Plt Count  222 K/mm3 (140-440)   12/13/21  13:33    


 


Lymph % (Auto)  22.0 % (13.4-35.0)   12/13/21  13:33    


 


Mono % (Auto)  10.5 % (0.0-7.3)  H  12/13/21  13:33    


 


Eos % (Auto)  2.7 % (0.0-4.3)   12/13/21  13:33    


 


Baso % (Auto)  0.8 % (0.0-1.8)   12/13/21  13:33    


 


Lymph # (Auto)  1.3 K/mm3 (1.2-5.4)   12/13/21  13:33    


 


Mono # (Auto)  0.6 K/mm3 (0.0-0.8)   12/13/21  13:33    


 


Eos # (Auto)  0.2 K/mm3 (0.0-0.4)   12/13/21  13:33    


 


Baso # (Auto)  0.0 K/mm3 (0.0-0.1)   12/13/21  13:33    


 


Seg Neutrophils %  64.0 % (40.0-70.0)   12/13/21  13:33    


 


Seg Neutrophils #  3.9 K/mm3 (1.8-7.7)   12/13/21  13:33    


 


PT  13.7 Sec. (12.2-14.9)   12/13/21  13:33    


 


INR  0.95  (0.87-1.13)   12/13/21  13:33    


 


Sodium  138 mmol/L (137-145)   12/14/21  03:22    


 


Potassium  3.6 mmol/L (3.6-5.0)   12/14/21  03:22    


 


Chloride  93.3 mmol/L ()  L  12/14/21  03:22    


 


Carbon Dioxide  36 mmol/L (22-30)  H D 12/14/21  03:22    


 


Anion Gap  12 mmol/L  12/14/21  03:22    


 


BUN  32 mg/dL (7-17)  H  12/14/21  03:22    


 


Creatinine  1.5 mg/dL (0.6-1.2)  H  12/14/21  03:22    


 


Estimated GFR  37 ml/min  12/14/21  03:22    


 


BUN/Creatinine Ratio  21 %  12/14/21  03:22    


 


Glucose  233 mg/dL ()  H  12/14/21  03:22    


 


POC Glucose  177 mg/dL ()  H  12/14/21  08:10    


 


Hemoglobin A1c  9.4 % (4-6)  H  12/14/21  03:22    


 


Calcium  8.7 mg/dL (8.4-10.2)   12/14/21  03:22    


 


Magnesium  1.80 mg/dL (1.7-2.3)   12/13/21  13:33    


 


Total Bilirubin  0.50 mg/dL (0.1-1.2)   12/13/21  13:33    


 


AST  21 units/L (5-40)   12/13/21  13:33    


 


ALT  17 units/L (7-56)   12/13/21  13:33    


 


Alkaline Phosphatase  77 units/L ()   12/13/21  13:33    


 


Total Creatine Kinase  106 units/L ()   12/13/21  13:33    


 


Troponin T  < 0.010 ng/mL (0.00-0.029)   12/14/21  03:22    


 


NT-Pro-B Natriuret Pep  1883 pg/mL (0-450)  H  12/13/21  13:33    


 


Total Protein  6.7 g/dL (6.3-8.2)   12/13/21  13:33    


 


Albumin  3.8 g/dL (3.9-5)  L  12/13/21  13:33    


 


Albumin/Globulin Ratio  1.3 %  12/13/21  13:33    


 


HCG, Quant  < 2 mIU/mL (0-4)   12/13/21  13:33    














Active Medications





- Current Medications


Current Medications: 














Generic Name Dose Route Start Last Admin





  Trade Name Freq  PRN Reason Stop Dose Admin


 


Acetaminophen  650 mg  12/14/21 01:16 





  Acetaminophen 325 Mg Tab  PO  





  Q4H PRN  





  Pain MILD(1-3)/Fever >100.5/HA  


 


Amiodarone HCl  100 mg  12/13/21 18:00  12/13/21 19:04





  Amiodarone 200 Mg Tab  PO   100 mg





  DAILY [facility]   Administration


 


Aspirin  325 mg  12/14/21 10:00 





  Aspirin Ec 325 Mg Tab  PO  





  QDAY [facility]  


 


Atorvastatin Calcium  80 mg  12/14/21 22:00 





  Atorvastatin 40 Mg Tab  PO  





  QHS [facility]  


 


Clopidogrel Bisulfate  75 mg  12/14/21 10:00 





  Clopidogrel 75 Mg Tab  PO  





  QDAY [facility]  


 


Diphenhydramine HCl  25 mg  12/14/21 03:16 





  Diphenhydramine 25 Mg Cap  PO  





  Q6H PRN  





  Allergic Reaction  


 


Ezetimibe  10 mg  12/14/21 10:00 





  Ezetimibe 10 Mg Tab  PO  





  QDAY [facility]  


 


Famotidine  20 mg  12/14/21 10:00 





  Famotidine 20 Mg Tab  PO  





  QDAY [facility]  


 


Ferrous Sulfate  325 mg  12/14/21 10:00 





  Ferrous Sulfate 325 Mg Tab  PO  





  QDAY [facility]  


 


Gabapentin  600 mg  12/14/21 08:00  12/14/21 08:42





  Gabapentin 300 Mg Cap  PO   600 mg





  BID [facility]   Administration


 


Heparin Sodium (Porcine)  5,000 unit  12/14/21 06:00  12/14/21 06:14





  Heparin 5,000 Unit/1 Ml Vial  SUB-Q   5,000 unit





  Q8HR [facility]   Administration


 


Hydromorphone HCl  0.5 mg  12/14/21 01:16 





  Hydromorphone 1 Mg/1 Ml Inj  IV  





  Q3H PRN  





  Pain , Severe (7-10)  


 


Insulin Glargine  35 units  12/14/21 10:00 





  Insulin Glargine 100 Units/Ml  SUB-Q  





  BID [facility]  


 


Insulin Human Lispro  0 unit  12/13/21 23:00  12/14/21 08:22





  Insulin Lispro 100 Unit/Ml  SUB-Q   2 unit





  ACHS [facility]   Administration





  Protocol  


 


Isosorbide Mononitrate  120 mg  12/14/21 10:00 





  Isosorbide Mononitrate Er 60 Mg Tab  PO  





  QDAY [facility]  


 


Levothyroxine Sodium  100 mcg  12/14/21 06:00  12/14/21 08:41





  Levothyroxine 100 Mcg Tab  PO   100 mcg





  Q24H [facility]   Administration


 


Metoclopramide HCl  5 mg  12/14/21 01:16 





  Metoclopramide 10 Mg/2 Ml Inj  IV  





  Q6H PRN  





  Nausea And Vomiting  


 


Metolazone  5 mg  12/13/21 18:00  12/13/21 19:04





  Metolazone 5 Mg Tab  PO   5 mg





  Q48HR [facility]   Administration


 


Nitroglycerin  0.4 mg  12/13/21 13:20 





  Nitroglycerin 0.4 Mg Tab Subl  SL  





  .Q5MIN PRN  





  Chest Pain  


 


Ondansetron HCl  4 mg  12/14/21 01:16 





  Ondansetron 4 Mg/2 Ml Inj  IV  





  Q8H PRN  





  Nausea And Vomiting  


 


Oxycodone/Acetaminophen  1 tab  12/14/21 01:16 





  Oxycodone /Acetaminophen 5-325mg Tab  PO  





  Q6H PRN  





  Pain, Moderate (4-6)  


 


Potassium Chloride  20 meq  12/14/21 10:00 





  Potassium Chloride Er 20 Meq Tab  PO  





  BID [facility]  


 


Sertraline HCl  50 mg  12/14/21 10:00 





  Sertraline 50 Mg Tab  PO  





  QDAY [facility]  


 


Sodium Chloride  10 ml  12/14/21 10:00 





  Sodium Chloride 0.9% 10 Ml Flush Syringe  IV  





  BID [facility]  


 


Sodium Chloride  10 ml  12/14/21 01:16 





  Sodium Chloride 0.9% 10 Ml Flush Syringe  IV  





  PRN PRN  





  LINE FLUSH  


 


Spironolactone  50 mg  12/14/21 10:00 





  Spironolactone 50 Mg Tab  PO  





  QDAY [facility]  


 


Torsemide  100 mg  12/13/21 15:00  12/13/21 15:08





  Torsemide 100 Mg Tab  PO   100 mg





  QDAY [facility]   Administration


 


Zolpidem Tartrate  10 mg  12/14/21 03:59 





  Zolpidem 5 Mg Tab  PO  





  QHS PRN  





  Insomnia

## 2022-03-22 ENCOUNTER — HOSPITAL ENCOUNTER (EMERGENCY)
Dept: HOSPITAL 5 - ED | Age: 47
Discharge: HOME | End: 2022-03-22
Payer: MEDICARE

## 2022-03-22 VITALS — SYSTOLIC BLOOD PRESSURE: 136 MMHG | DIASTOLIC BLOOD PRESSURE: 89 MMHG

## 2022-03-22 DIAGNOSIS — R06.02: Primary | ICD-10-CM

## 2022-03-22 DIAGNOSIS — K21.9: ICD-10-CM

## 2022-03-22 DIAGNOSIS — Z91.09: ICD-10-CM

## 2022-03-22 DIAGNOSIS — Z86.73: ICD-10-CM

## 2022-03-22 DIAGNOSIS — Z88.4: ICD-10-CM

## 2022-03-22 DIAGNOSIS — Z98.890: ICD-10-CM

## 2022-03-22 DIAGNOSIS — Z88.1: ICD-10-CM

## 2022-03-22 DIAGNOSIS — E11.9: ICD-10-CM

## 2022-03-22 DIAGNOSIS — Z88.2: ICD-10-CM

## 2022-03-22 DIAGNOSIS — N18.2: ICD-10-CM

## 2022-03-22 DIAGNOSIS — I11.0: ICD-10-CM

## 2022-03-22 DIAGNOSIS — I50.9: ICD-10-CM

## 2022-03-22 DIAGNOSIS — Z79.899: ICD-10-CM

## 2022-03-22 DIAGNOSIS — I12.9: ICD-10-CM

## 2022-03-22 LAB
ALBUMIN SERPL-MCNC: 3.7 G/DL (ref 3.9–5)
ALT SERPL-CCNC: 17 UNITS/L (ref 7–56)
BASOPHILS # (AUTO): 0.1 K/MM3 (ref 0–0.1)
BASOPHILS NFR BLD AUTO: 0.9 % (ref 0–1.8)
BUN SERPL-MCNC: 27 MG/DL (ref 7–17)
BUN/CREAT SERPL: 23 %
CALCIUM SERPL-MCNC: 9.3 MG/DL (ref 8.4–10.2)
EOSINOPHIL # BLD AUTO: 0.2 K/MM3 (ref 0–0.4)
EOSINOPHIL NFR BLD AUTO: 2.2 % (ref 0–4.3)
HCT VFR BLD CALC: 46.1 % (ref 30.3–42.9)
HEMOLYSIS INDEX: 161
HGB BLD-MCNC: 14.9 GM/DL (ref 10.1–14.3)
INR PPP: 0.82 (ref 0.87–1.13)
LYMPHOCYTES # BLD AUTO: 1.7 K/MM3 (ref 1.2–5.4)
LYMPHOCYTES NFR BLD AUTO: 22.5 % (ref 13.4–35)
MCHC RBC AUTO-ENTMCNC: 32 % (ref 30–34)
MCV RBC AUTO: 102 FL (ref 79–97)
MONOCYTES # (AUTO): 0.6 K/MM3 (ref 0–0.8)
MONOCYTES % (AUTO): 8.6 % (ref 0–7.3)
PLATELET # BLD: 281 K/MM3 (ref 140–440)
RBC # BLD AUTO: 4.53 M/MM3 (ref 3.65–5.03)

## 2022-03-22 PROCEDURE — 82550 ASSAY OF CK (CPK): CPT

## 2022-03-22 PROCEDURE — 94640 AIRWAY INHALATION TREATMENT: CPT

## 2022-03-22 PROCEDURE — 80053 COMPREHEN METABOLIC PANEL: CPT

## 2022-03-22 PROCEDURE — 36415 COLL VENOUS BLD VENIPUNCTURE: CPT

## 2022-03-22 PROCEDURE — 84484 ASSAY OF TROPONIN QUANT: CPT

## 2022-03-22 PROCEDURE — 96374 THER/PROPH/DIAG INJ IV PUSH: CPT

## 2022-03-22 PROCEDURE — 85025 COMPLETE CBC W/AUTO DIFF WBC: CPT

## 2022-03-22 PROCEDURE — 71046 X-RAY EXAM CHEST 2 VIEWS: CPT

## 2022-03-22 PROCEDURE — 82553 CREATINE MB FRACTION: CPT

## 2022-03-22 PROCEDURE — 85610 PROTHROMBIN TIME: CPT

## 2022-03-22 PROCEDURE — 83690 ASSAY OF LIPASE: CPT

## 2022-03-22 PROCEDURE — 99284 EMERGENCY DEPT VISIT MOD MDM: CPT

## 2022-03-22 PROCEDURE — 83735 ASSAY OF MAGNESIUM: CPT

## 2022-03-22 PROCEDURE — 83880 ASSAY OF NATRIURETIC PEPTIDE: CPT

## 2022-03-22 PROCEDURE — 93005 ELECTROCARDIOGRAM TRACING: CPT

## 2022-03-22 PROCEDURE — 96375 TX/PRO/DX INJ NEW DRUG ADDON: CPT

## 2022-03-22 NOTE — XRAY REPORT
CHEST 2 VIEWS 



INDICATION / CLINICAL INFORMATION: Dyspnea.



COMPARISON: 12/13/2021



FINDINGS:



SUPPORT DEVICES: Unchanged.

HEART / MEDIASTINUM: Stable. 

LUNGS / PLEURA: No significant pulmonary or pleural abnormality. No pneumothorax. 



ADDITIONAL FINDINGS: No significant additional findings.



IMPRESSION:

1. No acute findings.



Signer Name: Dakotah Hopper DO 

Signed: 3/22/2022 1:03 PM

Workstation Name: [id number]

## 2022-03-22 NOTE — EMERGENCY DEPARTMENT REPORT
ED Shortness of Breath HPI





- General


Chief Complaint: Dyspnea/Respdistress


Stated Complaint: CHEST PAIN/SOB


Time Seen by Provider: 03/22/22 12:13


Source: patient


Mode of arrival: Ambulatory


Limitations: No Limitations





- Related Data


                                Home Medications











 Medication  Instructions  Recorded  Confirmed  Last Taken


 


Isosorbide Mononitrate [Isosorbide 120 mg PO DAILY 02/05/15 12/14/21 12/13/21 

06:00





Mononitrate ER (IMDUR)]    


 


Insulin Detemir [Levemir VIAL] 30 unit SQ BID 07/15/20 12/14/21 12/12/21 21:00


 


Amiodarone HCl [Amiodarone 100  mg PO QDAY 12/14/21 12/14/21 12/13/21





TAB]    


 


Aspirin EC [Halfprin EC] 81 mg PO QDAY 12/14/21 12/14/21 12/13/21


 


metOLazone [Zaroxolyn] 5 mg PO QDAY 12/14/21 12/14/21 12/13/21








                                  Previous Rx's











 Medication  Instructions  Recorded  Last Taken  Type


 


Cholecalciferol Vit D3 [Vitamin D3 1,000 unit PO QDAY  tablet 08/30/17 12/13/21 

09:00 Rx





1,000 UNIT TAB]    


 


Clopidogrel [Plavix] 75 mg PO QDAY  tablet 08/30/17 12/13/21 06:00 Rx


 


Ezetimibe [Zetia] 10 mg PO QDAY  tablet 08/30/17 12/12/21 21:00 Rx


 


AtorvaSTATin [Lipitor] 80 mg PO QHS #30 tablet 12/24/20 12/12/21 21:00 Rx


 


Ferrous Sulfate [Feosol 325 MG tab] 325 mg PO QDAY #30 tablet 12/24/20 12/13/21 

09:00 Rx


 


Gabapentin 600 mg PO TID #90 capsule 12/24/20 12/13/21 06:00 Rx


 


Insulin Aspart (Nf) [NovoLOG 100 5 unit SQ AC #1 vial 12/24/20 12/13/21 06:00 Rx





UNITS/ML VIAL]    


 


Levothyroxine [Synthroid] 100 mcg PO QDAY #30 12/24/20 12/13/21 Rx


 


Nitroglycerin [Nitrostat] 0.4 mg SL Q5M PRN #25 tablet 12/24/20 Unknown Rx


 


Potassium Chloride [K-Dur] 20 meq PO BID #30 tab 12/24/20 12/13/21 06:00 Rx


 


Sertraline [Zoloft] 50 mg PO QDAY #30 tablet 12/24/20 12/13/21 06:00 Rx


 


Spironolactone [Aldactone] 50 mg PO QDAY #30 tablet 12/24/20 12/13/21 06:00 Rx


 


Torsemide [Demadex] 100 mg PO QDAY #30 12/24/20 1 Day Ago Rx





   ~12/12/21 


 


Zolpidem (Nf) [Ambien (Nf)] 10 mg PO QHS #10 12/24/20 1 Day Ago Rx





   ~12/12/21 


 


Apixaban [Eliquis] 5 mg PO BID #60 tablet 12/14/21 Unknown Rx











                                    Allergies











Allergy/AdvReac Type Severity Reaction Status Date / Time


 


adhesive tape Allergy  Blistering Verified 12/14/21 09:52





   Rash  


 


bupivacaine Allergy  Angioedema, Verified 12/14/21 09:52





   hives  


 


ketorolac [From Toradol] Allergy  Hives Verified 12/13/21 12:19


 


Sulfa (Sulfonamide Allergy  Hives Verified 12/14/21 09:52





Antibiotics)     


 


famotidine [From Pepcid] AdvReac  Vomiting Verified 12/13/21 12:19














ED Review of Systems


ROS: 


Stated complaint: CHEST PAIN/SOB


Other details as noted in HPI








ED Past Medical Hx





- Past Medical History


Hx Hypertension: Yes


Hx Heart Attack/AMI: Yes


Hx Congestive Heart Failure: Yes


Hx Diabetes: Yes


Hx GERD: Yes


Hx Liver Disease: No


Hx Renal Disease: Yes (CKD, stage 2)


Hx Seizures: No


Hx Asthma: No


Hx COPD: No


Hx Tuberculosis: No


Hx HIV: No


Additional medical history: CAD, gatroparesis, hypOthyroidism, retinopathy, 

neuropathy, elevated cholesterol.  15 stents. " BLOOD CLOT IN HEART.'





- Surgical History


Hx Coronary Stent: Yes (15)


Hx Pacemaker: Yes


Hx Internal Defibrillator: Yes (AICD)


Additional Surgical History: tubal ligation, left fallopian tube removed, right 

knee surgery, right arm surgery, left eye surgeryPOWER PORT,Pacemaker  Right 

ring finger surgery.DexCom continueous glucose meter, right abd





- Social History


Smoking Status: Never Smoker





- Medications


Home Medications: 


                                Home Medications











 Medication  Instructions  Recorded  Confirmed  Last Taken  Type


 


Isosorbide Mononitrate [Isosorbide 120 mg PO DAILY 02/05/15 12/14/21 12/13/21 

06:00 History





Mononitrate ER (IMDUR)]     


 


Cholecalciferol Vit D3 [Vitamin D3 1,000 unit PO QDAY  tablet 08/30/17 12/14/21 12/13/21 09:00 Rx





1,000 UNIT TAB]     


 


Clopidogrel [Plavix] 75 mg PO QDAY  tablet 08/30/17 12/14/21 12/13/21 06:00 Rx


 


Ezetimibe [Zetia] 10 mg PO QDAY  tablet 08/30/17 12/14/21 12/12/21 21:00 Rx


 


Insulin Detemir [Levemir VIAL] 30 unit SQ BID 07/15/20 12/14/21 12/12/21 21:00 

History


 


AtorvaSTATin [Lipitor] 80 mg PO QHS #30 tablet 12/24/20 12/14/21 12/12/21 21:00 

Rx


 


Ferrous Sulfate [Feosol 325 MG tab] 325 mg PO QDAY #30 tablet 12/24/20 12/14/21 12/13/21 09:00 Rx


 


Gabapentin 600 mg PO TID #90 capsule 12/24/20 12/14/21 12/13/21 06:00 Rx


 


Insulin Aspart (Nf) [NovoLOG 100 5 unit SQ AC #1 vial 12/24/20 12/14/21 12/13/21

 06:00 Rx





UNITS/ML VIAL]     


 


Levothyroxine [Synthroid] 100 mcg PO QDAY #30 12/24/20 12/14/21 12/13/21 Rx


 


Nitroglycerin [Nitrostat] 0.4 mg SL Q5M PRN #25 tablet 12/24/20 12/14/21 Unknown

 Rx


 


Potassium Chloride [K-Dur] 20 meq PO BID #30 tab 12/24/20 12/14/21 12/13/21 

06:00 Rx


 


Sertraline [Zoloft] 50 mg PO QDAY #30 tablet 12/24/20 12/14/21 12/13/21 06:00 Rx


 


Spironolactone [Aldactone] 50 mg PO QDAY #30 tablet 12/24/20 12/14/21 12/13/21 

06:00 Rx


 


Torsemide [Demadex] 100 mg PO QDAY #30 12/24/20 12/14/21 1 Day Ago Rx





    ~12/12/21 


 


Zolpidem (Nf) [Ambien (Nf)] 10 mg PO QHS #10 12/24/20 12/14/21 1 Day Ago Rx





    ~12/12/21 


 


Amiodarone HCl [Amiodarone 100  mg PO QDAY 12/14/21 12/14/21 12/13/21 

History





TAB]     


 


Apixaban [Eliquis] 5 mg PO BID #60 tablet 12/14/21  Unknown Rx


 


Aspirin EC [Halfprin EC] 81 mg PO QDAY 12/14/21 12/14/21 12/13/21 History


 


metOLazone [Zaroxolyn] 5 mg PO QDAY 12/14/21 12/14/21 12/13/21 History














ED Physical Exam





- General


Limitations: No Limitations





ED Course


                                   Vital Signs











  03/22/22





  08:50


 


Temperature 97.8 F


 


Pulse Rate 61


 


Respiratory 16





Rate 


 


Blood Pressure 115/74





[Left] 














ED Medical Decision Making





- Lab Data


Result diagrams: 


                                 03/22/22 16:24





                                 03/22/22 16:24


Critical care attestation.: 


If time is entered above; I have spent that time in minutes in the direct care 

of this critically ill patient, excluding procedure time.








ED Disposition


Clinical Impression: 


 SOB (shortness of breath), CHF exacerbation





Disposition: 01 HOME / SELF CARE / HOMELESS


Is pt being admited?: No


Does the pt Need Aspirin: No


Condition: Stable


Instructions:  Shortness of Breath, Adult, Easy-to-Read


Referrals: 


PRIMARY CARE,MD [Primary Care Provider] - 3-5 Days

## 2022-03-24 NOTE — ELECTROCARDIOGRAPH REPORT
[facility]

                                       

Test Date:    2022               Test Time:    08:53:03

Pat Name:     JULITA LOVE             Department:   

Patient ID:   [id number]          Room:          

Gender:       F                        Technician:   FACUNDO

:          1975               Requested By: KRISTINE ELIZABETH

Order Number: [id number]              Reading MD:   Dallas Black

                                 Measurements

Intervals                              Axis          

Rate:         78                       P:            16

DC:           181                      QRS:          49

QRSD:         135                      T:            -14

QT:           433                                    

QTc:          495                                    

                           Interpretive Statements

Sinus rhythm

Left atrial enlargement

LVH with secondary repolarization abnormality

Probable anterior infarct, age indeterminate

Compared to ECG 2021 13:23:31

Left ventricular hypertrophy now present

Early repolarization now present

Myocardial infarct finding now present

Ventricular premature complex(es) no longer present

Intraventricular conduction delay no longer present

Electronically Signed On 3- 11:18:52 EDT by Dallas Black

## 2022-04-13 ENCOUNTER — HOSPITAL ENCOUNTER (OUTPATIENT)
Dept: HOSPITAL 5 - ED | Age: 47
Setting detail: OBSERVATION
LOS: 2 days | Discharge: HOME | End: 2022-04-15
Attending: INTERNAL MEDICINE | Admitting: INTERNAL MEDICINE
Payer: MEDICARE

## 2022-04-13 DIAGNOSIS — Z86.16: ICD-10-CM

## 2022-04-13 DIAGNOSIS — Z91.19: ICD-10-CM

## 2022-04-13 DIAGNOSIS — E66.01: ICD-10-CM

## 2022-04-13 DIAGNOSIS — Z79.82: ICD-10-CM

## 2022-04-13 DIAGNOSIS — R07.89: Primary | ICD-10-CM

## 2022-04-13 DIAGNOSIS — I25.5: ICD-10-CM

## 2022-04-13 DIAGNOSIS — I25.118: ICD-10-CM

## 2022-04-13 DIAGNOSIS — T82.867D: ICD-10-CM

## 2022-04-13 DIAGNOSIS — K31.84: ICD-10-CM

## 2022-04-13 DIAGNOSIS — I13.0: ICD-10-CM

## 2022-04-13 DIAGNOSIS — Z98.890: ICD-10-CM

## 2022-04-13 DIAGNOSIS — E87.6: ICD-10-CM

## 2022-04-13 DIAGNOSIS — E11.9: ICD-10-CM

## 2022-04-13 DIAGNOSIS — N17.9: ICD-10-CM

## 2022-04-13 DIAGNOSIS — E03.9: ICD-10-CM

## 2022-04-13 DIAGNOSIS — I50.23: ICD-10-CM

## 2022-04-13 DIAGNOSIS — R06.00: ICD-10-CM

## 2022-04-13 DIAGNOSIS — Z95.1: ICD-10-CM

## 2022-04-13 DIAGNOSIS — H35.00: ICD-10-CM

## 2022-04-13 DIAGNOSIS — E78.2: ICD-10-CM

## 2022-04-13 DIAGNOSIS — I25.10: ICD-10-CM

## 2022-04-13 DIAGNOSIS — K21.9: ICD-10-CM

## 2022-04-13 DIAGNOSIS — Z95.810: ICD-10-CM

## 2022-04-13 DIAGNOSIS — Z98.51: ICD-10-CM

## 2022-04-13 DIAGNOSIS — I25.2: ICD-10-CM

## 2022-04-13 DIAGNOSIS — Z79.4: ICD-10-CM

## 2022-04-13 DIAGNOSIS — N28.9: ICD-10-CM

## 2022-04-13 DIAGNOSIS — N18.2: ICD-10-CM

## 2022-04-13 DIAGNOSIS — E78.00: ICD-10-CM

## 2022-04-13 DIAGNOSIS — Z79.899: ICD-10-CM

## 2022-04-13 LAB
ALBUMIN SERPL-MCNC: 3.7 G/DL (ref 3.9–5)
ALT SERPL-CCNC: 30 UNITS/L (ref 7–56)
APTT BLD: 25.8 SEC. (ref 24.2–36.6)
BASOPHILS # (AUTO): 0.1 K/MM3 (ref 0–0.1)
BASOPHILS NFR BLD AUTO: 1 % (ref 0–1.8)
BUN SERPL-MCNC: 30 MG/DL (ref 7–17)
BUN/CREAT SERPL: 21 %
CALCIUM SERPL-MCNC: 8.9 MG/DL (ref 8.4–10.2)
EOSINOPHIL # BLD AUTO: 0.5 K/MM3 (ref 0–0.4)
EOSINOPHIL NFR BLD AUTO: 5.6 % (ref 0–4.3)
HCT VFR BLD CALC: 45.8 % (ref 30.3–42.9)
HEMOLYSIS INDEX: 21
HGB BLD-MCNC: 15 GM/DL (ref 10.1–14.3)
INR PPP: 0.91 (ref 0.87–1.13)
LYMPHOCYTES # BLD AUTO: 2.2 K/MM3 (ref 1.2–5.4)
LYMPHOCYTES NFR BLD AUTO: 22.7 % (ref 13.4–35)
MCHC RBC AUTO-ENTMCNC: 33 % (ref 30–34)
MCV RBC AUTO: 101 FL (ref 79–97)
MONOCYTES # (AUTO): 0.8 K/MM3 (ref 0–0.8)
MONOCYTES % (AUTO): 8.1 % (ref 0–7.3)
PLATELET # BLD: 266 K/MM3 (ref 140–440)
RBC # BLD AUTO: 4.53 M/MM3 (ref 3.65–5.03)

## 2022-04-13 PROCEDURE — 96523 IRRIG DRUG DELIVERY DEVICE: CPT

## 2022-04-13 PROCEDURE — 71046 X-RAY EXAM CHEST 2 VIEWS: CPT

## 2022-04-13 PROCEDURE — 82962 GLUCOSE BLOOD TEST: CPT

## 2022-04-13 PROCEDURE — C8929 TTE W OR WO FOL WCON,DOPPLER: HCPCS

## 2022-04-13 PROCEDURE — 93306 TTE W/DOPPLER COMPLETE: CPT

## 2022-04-13 PROCEDURE — 93005 ELECTROCARDIOGRAM TRACING: CPT

## 2022-04-13 PROCEDURE — 85730 THROMBOPLASTIN TIME PARTIAL: CPT

## 2022-04-13 PROCEDURE — 80053 COMPREHEN METABOLIC PANEL: CPT

## 2022-04-13 PROCEDURE — 36415 COLL VENOUS BLD VENIPUNCTURE: CPT

## 2022-04-13 PROCEDURE — A9502 TC99M TETROFOSMIN: HCPCS

## 2022-04-13 PROCEDURE — 85610 PROTHROMBIN TIME: CPT

## 2022-04-13 PROCEDURE — 93017 CV STRESS TEST TRACING ONLY: CPT

## 2022-04-13 PROCEDURE — 96372 THER/PROPH/DIAG INJ SC/IM: CPT

## 2022-04-13 PROCEDURE — 80048 BASIC METABOLIC PNL TOTAL CA: CPT

## 2022-04-13 PROCEDURE — 78452 HT MUSCLE IMAGE SPECT MULT: CPT

## 2022-04-13 PROCEDURE — 83880 ASSAY OF NATRIURETIC PEPTIDE: CPT

## 2022-04-13 PROCEDURE — 96376 TX/PRO/DX INJ SAME DRUG ADON: CPT

## 2022-04-13 PROCEDURE — 84484 ASSAY OF TROPONIN QUANT: CPT

## 2022-04-13 PROCEDURE — 96375 TX/PRO/DX INJ NEW DRUG ADDON: CPT

## 2022-04-13 PROCEDURE — 85025 COMPLETE CBC W/AUTO DIFF WBC: CPT

## 2022-04-13 PROCEDURE — 99285 EMERGENCY DEPT VISIT HI MDM: CPT

## 2022-04-13 PROCEDURE — G0378 HOSPITAL OBSERVATION PER HR: HCPCS

## 2022-04-13 PROCEDURE — 96374 THER/PROPH/DIAG INJ IV PUSH: CPT

## 2022-04-13 NOTE — EMERGENCY DEPARTMENT REPORT
ED Chest Pain HPI





- General


Chief Complaint: Chest Pain


Stated Complaint: CHEST PAIN/SOB/NAUSEA


Time Seen by Provider: 04/13/22 17:58


Source: patient


Mode of arrival: Ambulatory


Limitations: No Limitations





- History of Present Illness


Initial Comments: 





Is a 46-year-old female who presents with chest pain that has been going on for 

today patient states chest pain is moderate nothing makes it better.  Patient 

states that she has some slight nausea and some slight shortness of breath.  She

states she has chest port for access and that she needs IV Benadryl before she 

gets any blood work.





- Related Data


                                Home Medications











 Medication  Instructions  Recorded  Confirmed  Last Taken


 


Isosorbide Mononitrate [Isosorbide 120 mg PO DAILY 02/05/15 12/14/21 12/13/21 

06:00





Mononitrate ER (IMDUR)]    


 


Insulin Detemir [Levemir VIAL] 30 unit SQ BID 07/15/20 12/14/21 12/12/21 21:00


 


Amiodarone HCl [Amiodarone 100  mg PO QDAY 12/14/21 12/14/21 12/13/21





TAB]    


 


Aspirin EC [Halfprin EC] 81 mg PO QDAY 12/14/21 12/14/21 12/13/21


 


metOLazone [Zaroxolyn] 5 mg PO QDAY 12/14/21 12/14/21 12/13/21








                                  Previous Rx's











 Medication  Instructions  Recorded  Last Taken  Type


 


Cholecalciferol Vit D3 [Vitamin D3 1,000 unit PO QDAY  tablet 08/30/17 12/13/21 

09:00 Rx





1,000 UNIT TAB]    


 


Clopidogrel [Plavix] 75 mg PO QDAY  tablet 08/30/17 12/13/21 06:00 Rx


 


Ezetimibe [Zetia] 10 mg PO QDAY  tablet 08/30/17 12/12/21 21:00 Rx


 


AtorvaSTATin [Lipitor] 80 mg PO QHS #30 tablet 12/24/20 12/12/21 21:00 Rx


 


Ferrous Sulfate [Feosol 325 MG tab] 325 mg PO QDAY #30 tablet 12/24/20 12/13/21 

09:00 Rx


 


Gabapentin 600 mg PO TID #90 capsule 12/24/20 12/13/21 06:00 Rx


 


Insulin Aspart (Nf) [NovoLOG 100 5 unit SQ AC #1 vial 12/24/20 12/13/21 06:00 Rx





UNITS/ML VIAL]    


 


Levothyroxine [Synthroid] 100 mcg PO QDAY #30 12/24/20 12/13/21 Rx


 


Nitroglycerin [Nitrostat] 0.4 mg SL Q5M PRN #25 tablet 12/24/20 Unknown Rx


 


Potassium Chloride [K-Dur] 20 meq PO BID #30 tab 12/24/20 12/13/21 06:00 Rx


 


Sertraline [Zoloft] 50 mg PO QDAY #30 tablet 12/24/20 12/13/21 06:00 Rx


 


Spironolactone [Aldactone] 50 mg PO QDAY #30 tablet 12/24/20 12/13/21 06:00 Rx


 


Torsemide [Demadex] 100 mg PO QDAY #30 12/24/20 1 Day Ago Rx





   ~12/12/21 


 


Zolpidem (Nf) [Ambien (Nf)] 10 mg PO QHS #10 12/24/20 1 Day Ago Rx





   ~12/12/21 


 


Apixaban [Eliquis] 5 mg PO BID #60 tablet 12/14/21 Unknown Rx











                                    Allergies











Allergy/AdvReac Type Severity Reaction Status Date / Time


 


adhesive tape Allergy  Blistering Verified 12/14/21 09:52





   Rash  


 


bupivacaine Allergy  Angioedema, Verified 12/14/21 09:52





   hives  


 


ketorolac [From Toradol] Allergy  Hives Verified 12/13/21 12:19


 


Sulfa (Sulfonamide Allergy  Hives Verified 12/14/21 09:52





Antibiotics)     


 


famotidine [From Pepcid] AdvReac  Vomiting Verified 12/13/21 12:19














Heart Score





- HEART Score


History: Slightly suspicious


EKG: Normal


Age: 45-65


Risk factors: 1-2 risk factors


Troponin: < normal limit


HEART Score: 2





- EKG Read Time


Time EKG Completed: 16:35


EKG Read Time: 16:35





- Critical Actions


Critical Actions: 4-6 pts:12-16.6% risk of adverse cardiac event. Should be 

admitted





ED Review of Systems


ROS: 


Stated complaint: CHEST PAIN/SOB/NAUSEA


Other details as noted in HPI





Constitutional: denies: chills, fever


Eyes: denies: eye pain, eye discharge, vision change


ENT: denies: ear pain, throat pain


Respiratory: denies: cough, shortness of breath, wheezing


Cardiovascular: chest pain.  denies: palpitations


Endocrine: no symptoms reported


Gastrointestinal: denies: abdominal pain, nausea, diarrhea


Genitourinary: denies: urgency, dysuria, discharge


Musculoskeletal: denies: back pain, joint swelling, arthralgia


Skin: denies: rash, lesions


Neurological: denies: headache, weakness, paresthesias


Psychiatric: denies: anxiety, depression


Hematological/Lymphatic: denies: easy bleeding, easy bruising





ED Past Medical Hx





- Past Medical History


Hx Hypertension: Yes


Hx Heart Attack/AMI: Yes


Hx Congestive Heart Failure: Yes


Hx Diabetes: Yes


Hx GERD: Yes


Hx Liver Disease: No


Hx Renal Disease: Yes (CKD, stage 2)


Hx Seizures: No


Hx Asthma: No


Hx COPD: No


Hx Tuberculosis: No


Hx HIV: No


Additional medical history: CAD, gatroparesis, hypOthyroidism, retinopathy, 

neuropathy, elevated cholesterol.  15 stents. " BLOOD CLOT IN HEART.'





- Surgical History


Hx Coronary Stent: Yes (15)


Hx Pacemaker: Yes


Hx Internal Defibrillator: Yes (AICD)


Additional Surgical History: tubal ligation, left fallopian tube removed, right 

knee surgery, right arm surgery, left eye surgeryPOWER PORT,Pacemaker  Right 

ring finger surgery.DexCom continueous glucose meter, right abd





- Social History


Smoking Status: Never Smoker


Substance Use Type: None





- Medications


Home Medications: 


                                Home Medications











 Medication  Instructions  Recorded  Confirmed  Last Taken  Type


 


Isosorbide Mononitrate [Isosorbide 120 mg PO DAILY 02/05/15 12/14/21 12/13/21 

06:00 History





Mononitrate ER (IMDUR)]     


 


Cholecalciferol Vit D3 [Vitamin D3 1,000 unit PO QDAY  tablet 08/30/17 12/14/21 12/13/21 09:00 Rx





1,000 UNIT TAB]     


 


Clopidogrel [Plavix] 75 mg PO QDAY  tablet 08/30/17 12/14/21 12/13/21 06:00 Rx


 


Ezetimibe [Zetia] 10 mg PO QDAY  tablet 08/30/17 12/14/21 12/12/21 21:00 Rx


 


Insulin Detemir [Levemir VIAL] 30 unit SQ BID 07/15/20 12/14/21 12/12/21 21:00 

History


 


AtorvaSTATin [Lipitor] 80 mg PO QHS #30 tablet 12/24/20 12/14/21 12/12/21 21:00 

Rx


 


Ferrous Sulfate [Feosol 325 MG tab] 325 mg PO QDAY #30 tablet 12/24/20 12/14/21 12/13/21 09:00 Rx


 


Gabapentin 600 mg PO TID #90 capsule 12/24/20 12/14/21 12/13/21 06:00 Rx


 


Insulin Aspart (Nf) [NovoLOG 100 5 unit SQ AC #1 vial 12/24/20 12/14/21 12/13/21

 06:00 Rx





UNITS/ML VIAL]     


 


Levothyroxine [Synthroid] 100 mcg PO QDAY #30 12/24/20 12/14/21 12/13/21 Rx


 


Nitroglycerin [Nitrostat] 0.4 mg SL Q5M PRN #25 tablet 12/24/20 12/14/21 Unknown

 Rx


 


Potassium Chloride [K-Dur] 20 meq PO BID #30 tab 12/24/20 12/14/21 12/13/21 

06:00 Rx


 


Sertraline [Zoloft] 50 mg PO QDAY #30 tablet 12/24/20 12/14/21 12/13/21 06:00 Rx


 


Spironolactone [Aldactone] 50 mg PO QDAY #30 tablet 12/24/20 12/14/21 12/13/21 

06:00 Rx


 


Torsemide [Demadex] 100 mg PO QDAY #30 12/24/20 12/14/21 1 Day Ago Rx





    ~12/12/21 


 


Zolpidem (Nf) [Ambien (Nf)] 10 mg PO QHS #10 12/24/20 12/14/21 1 Day Ago Rx





    ~12/12/21 


 


Amiodarone HCl [Amiodarone 100  mg PO QDAY 12/14/21 12/14/21 12/13/21 H

istory





TAB]     


 


Apixaban [Eliquis] 5 mg PO BID #60 tablet 12/14/21  Unknown Rx


 


Aspirin EC [Halfprin EC] 81 mg PO QDAY 12/14/21 12/14/21 12/13/21 History


 


metOLazone [Zaroxolyn] 5 mg PO QDAY 12/14/21 12/14/21 12/13/21 History














ED Physical Exam





- General


Limitations: No Limitations


General appearance: alert, in no apparent distress





- Head


Head exam: Present: atraumatic, normocephalic





- Eye


Eye exam: Present: normal appearance





- ENT


ENT exam: Present: mucous membranes moist





- Neck


Neck exam: Present: normal inspection





- Respiratory


Respiratory exam: Present: normal lung sounds bilaterally.  Absent: respiratory 

distress





- Cardiovascular


Cardiovascular Exam: Present: regular rate, normal rhythm.  Absent: systolic 

murmur, diastolic murmur, rubs, gallop





- GI/Abdominal


GI/Abdominal exam: Present: soft, normal bowel sounds





- Extremities Exam


Extremities exam: Present: normal inspection





- Back Exam


Back exam: Present: normal inspection





- Neurological Exam


Neurological exam: Present: alert, oriented X3





- Psychiatric


Psychiatric exam: Present: normal affect, normal mood





- Skin


Skin exam: Present: warm, dry, intact, normal color.  Absent: rash





ED Course


                                   Vital Signs











  04/13/22 04/13/22 04/13/22





  17:54 18:08 19:03


 


Temperature   


 


Pulse Rate   


 


Respiratory   





Rate   


 


Blood Pressure 122/68 120/68 124/73


 


O2 Sat by Pulse   





Oximetry   














  04/13/22 04/13/22 04/13/22





  19:15 19:31 19:45


 


Temperature   


 


Pulse Rate   


 


Respiratory   





Rate   


 


Blood Pressure 136/89 129/77 129/77


 


O2 Sat by Pulse 97 96 96





Oximetry   














  04/13/22 04/13/22 04/13/22





  20:00 20:01 20:03


 


Temperature   97.0 F L


 


Pulse Rate   94 H


 


Respiratory   18





Rate   


 


Blood Pressure  129/77 


 


O2 Sat by Pulse 96 97 96





Oximetry   














ANURAG score





- Anurag Score


Age > 65: (0) No


Aspirin use within the Past 7 Days: (1) Yes


3 or more CAD Risk Factors: (1) Yes


2 or more Angina events in past 24 hrs: (1) Yes


Known CAD with more than 50% Stenosis: (1) Yes


Elevated Cardiac Markers: (0) No


ST Deviation Greater than 0.5mm: (0) No


ANURAG Score: 4





ED Medical Decision Making





- Lab Data


Result diagrams: 


                                 04/13/22 19:06





                                 04/13/22 19:06








                                   Lab Results











  04/13/22 04/13/22 04/13/22 Range/Units





  19:06 19:06 20:22 


 


WBC  9.8    (4.5-11.0)  K/mm3


 


RBC  4.53    (3.65-5.03)  M/mm3


 


Hgb  15.0 H    (10.1-14.3)  gm/dl


 


Hct  45.8 H    (30.3-42.9)  %


 


MCV  101 H    (79-97)  fl


 


MCH  33 H    (28-32)  pg


 


MCHC  33    (30-34)  %


 


RDW  14.7    (13.2-15.2)  %


 


Plt Count  266    (140-440)  K/mm3


 


Lymph % (Auto)  22.7    (13.4-35.0)  %


 


Mono % (Auto)  8.1 H    (0.0-7.3)  %


 


Eos % (Auto)  5.6 H    (0.0-4.3)  %


 


Baso % (Auto)  1.0    (0.0-1.8)  %


 


Lymph # (Auto)  2.2    (1.2-5.4)  K/mm3


 


Mono # (Auto)  0.8    (0.0-0.8)  K/mm3


 


Eos # (Auto)  0.5 H    (0.0-0.4)  K/mm3


 


Baso # (Auto)  0.1    (0.0-0.1)  K/mm3


 


Seg Neutrophils %  62.6    (40.0-70.0)  %


 


Seg Neutrophils #  6.2    (1.8-7.7)  K/mm3


 


PT    13.2  (12.2-14.9)  Sec.


 


INR    0.91  (0.87-1.13)  


 


APTT    25.8  (24.2-36.6)  Sec.


 


Sodium   140   (137-145)  mmol/L


 


Potassium   3.3 L   (3.6-5.0)  mmol/L


 


Chloride   100.5   ()  mmol/L


 


Carbon Dioxide   26   (22-30)  mmol/L


 


Anion Gap   17   mmol/L


 


BUN   30 H   (7-17)  mg/dL


 


Creatinine   1.4 H   (0.6-1.2)  mg/dL


 


Estimated GFR   40   ml/min


 


BUN/Creatinine Ratio   21   %


 


Glucose   103 H   ()  mg/dL


 


Calcium   8.9   (8.4-10.2)  mg/dL


 


Total Bilirubin   0.40   (0.1-1.2)  mg/dL


 


AST   27   (5-40)  units/L


 


ALT   30   (7-56)  units/L


 


Alkaline Phosphatase   73   ()  units/L


 


Troponin T   < 0.010   (0.00-0.029)  ng/mL


 


NT-Pro-B Natriuret Pep   1292 H   (0-450)  pg/mL


 


Total Protein   7.0   (6.3-8.2)  g/dL


 


Albumin   3.7 L   (3.9-5)  g/dL


 


Albumin/Globulin Ratio   1.1   %














- EKG Data


-: EKG Interpreted by Me





- EKG Data





04/13/22 22:43


EKG time 16: 33 rate 81 sinus rhythm multiform ventricular premature complexes 

left atrial enlargement LVH





- Radiology Data


Radiology results: report reviewed





- Medical Decision Making


Chief medical diagnosis: Non-STEMI differential medical diagnosis: 


Arrhythmia, electrolyte abnormality, heart failure


I will get CBC, BMP, EKG, 2 sets of troponin IV pain medicine and I will consult

 cardiology to admit patient for unstable angina.


Critical Care Time: No


Critical care attestation.: 


If time is entered above; I have spent that time in minutes in the direct care 

of this critically ill patient, excluding procedure time.








ED Disposition


Clinical Impression: 


 Acute chest pain, AICD (automatic cardioverter/defibrillator) present





Dyspnea


Qualifiers:


 Dyspnea type: unspecified Qualified Code(s): R06.00 - Dyspnea, unspecified





Disposition: 09 ADMITTED AS INPATIENT


Is pt being admited?: Yes


Does the pt Need Aspirin: Yes


Condition: Stable


Instructions:  Nonspecific Chest Pain, Adult, Chest Pain (ED)


Referrals: 


PRIMARY CARE,MD [Primary Care Provider] - 3-5 Days

## 2022-04-13 NOTE — XRAY REPORT
CHEST 2 VIEWS 



INDICATION / CLINICAL INFORMATION: Chest Pain.



COMPARISON: 03/22/22.



FINDINGS:



SUPPORT DEVICES: The positions of the right jugular Port-A-Cath and single lead left subclavian ICD h
ave not changed.

HEART / MEDIASTINUM: The heart size is normal. There are coronary artery stents. The aorta is normal 
in caliber 

LUNGS / PLEURA: A small metallic device is noted in the left retrocardiac region, unchanged No new pu
lmonary or pleural abnormality. No pneumothorax. 



ADDITIONAL FINDINGS: No significant additional findings.



IMPRESSION: No acute abnormality or significant change.



Signer Name: Ector Ramos MD 

Signed: 4/13/2022 5:07 PM

Workstation Name: [facility]

## 2022-04-14 LAB
BASOPHILS # (AUTO): 0.1 K/MM3 (ref 0–0.1)
BASOPHILS NFR BLD AUTO: 0.7 % (ref 0–1.8)
BUN SERPL-MCNC: 30 MG/DL (ref 7–17)
BUN/CREAT SERPL: 21 %
CALCIUM SERPL-MCNC: 7.9 MG/DL (ref 8.4–10.2)
EOSINOPHIL # BLD AUTO: 0.3 K/MM3 (ref 0–0.4)
EOSINOPHIL NFR BLD AUTO: 3.9 % (ref 0–4.3)
HCT VFR BLD CALC: 42.6 % (ref 30.3–42.9)
HEMOLYSIS INDEX: 5
HGB BLD-MCNC: 13.9 GM/DL (ref 10.1–14.3)
LYMPHOCYTES # BLD AUTO: 1.9 K/MM3 (ref 1.2–5.4)
LYMPHOCYTES NFR BLD AUTO: 21.2 % (ref 13.4–35)
MCHC RBC AUTO-ENTMCNC: 33 % (ref 30–34)
MCV RBC AUTO: 101 FL (ref 79–97)
MONOCYTES # (AUTO): 0.8 K/MM3 (ref 0–0.8)
MONOCYTES % (AUTO): 8.6 % (ref 0–7.3)
PLATELET # BLD: 230 K/MM3 (ref 140–440)
RBC # BLD AUTO: 4.23 M/MM3 (ref 3.65–5.03)

## 2022-04-14 RX ADMIN — Medication SCH ML: at 11:47

## 2022-04-14 RX ADMIN — GABAPENTIN SCH MG: 300 CAPSULE ORAL at 20:36

## 2022-04-14 RX ADMIN — APIXABAN SCH MG: 5 TABLET, FILM COATED ORAL at 11:46

## 2022-04-14 RX ADMIN — MORPHINE SULFATE PRN MG: 2 INJECTION, SOLUTION INTRAMUSCULAR; INTRAVENOUS at 15:36

## 2022-04-14 RX ADMIN — Medication SCH ML: at 22:31

## 2022-04-14 RX ADMIN — AMIODARONE HYDROCHLORIDE SCH MG: 200 TABLET ORAL at 12:30

## 2022-04-14 RX ADMIN — INSULIN LISPRO SCH UNIT: 100 INJECTION, SOLUTION INTRAVENOUS; SUBCUTANEOUS at 22:26

## 2022-04-14 RX ADMIN — SERTRALINE SCH MG: 50 TABLET, FILM COATED ORAL at 11:46

## 2022-04-14 RX ADMIN — INSULIN LISPRO SCH: 100 INJECTION, SOLUTION INTRAVENOUS; SUBCUTANEOUS at 11:40

## 2022-04-14 RX ADMIN — ASPIRIN SCH MG: 81 TABLET, COATED ORAL at 11:45

## 2022-04-14 RX ADMIN — LEVOTHYROXINE SODIUM SCH MCG: 0.1 TABLET ORAL at 12:24

## 2022-04-14 RX ADMIN — MORPHINE SULFATE PRN MG: 2 INJECTION, SOLUTION INTRAMUSCULAR; INTRAVENOUS at 04:23

## 2022-04-14 RX ADMIN — OXYCODONE AND ACETAMINOPHEN PRN TAB: 5; 325 TABLET ORAL at 20:36

## 2022-04-14 RX ADMIN — INSULIN LISPRO SCH UNIT: 100 INJECTION, SOLUTION INTRAVENOUS; SUBCUTANEOUS at 19:41

## 2022-04-14 RX ADMIN — CLOPIDOGREL BISULFATE SCH MG: 75 TABLET ORAL at 11:45

## 2022-04-14 RX ADMIN — INSULIN LISPRO SCH UNIT: 100 INJECTION, SOLUTION INTRAVENOUS; SUBCUTANEOUS at 13:00

## 2022-04-14 RX ADMIN — APIXABAN SCH MG: 5 TABLET, FILM COATED ORAL at 22:10

## 2022-04-14 NOTE — HISTORY AND PHYSICAL REPORT
History of Present Illness


Date of examination: 04/14/22


Date of admission: 


04/14/2022


Chief complaint: 





Chest Pain


History of present illness: 





60-year-old white female with known history of hypertension, diabetes mellitus, 

coronary artery disease and congestive heart failure presenting to the emergency

room today complaining of chest pain.  Chest pain was said to be substernal and 

started while at rest.  She had associated shortness of breath and nausea.  

Denies any vomiting denies any abdominal pain.  Patient denies any headache or 

dizziness denies any diaphoresis.


There is no no relieving or exacerbating factor.





Work-up in the emergency room today troponin, chest x-ray and EKG were with 

within normal limits.


Patient states she had stress test and neck cardiac cath within the last 2years.

 She follows up with [facility].





Patient is being admitted for chest pain evaluation.





Past History


Past Medical History: acute MI, diabetes, hypertension, renal failure, other 

(CAD, gatroparesis, hypOthyroidism, retinopathy, neuropathy, elevated 

cholesterol.  15 stents. " BLOOD CLOT IN HEART.')


Past Surgical History: Other ( tubal ligation, left fallopian tube removed, 

right knee surgery, right arm surgery, left eye surgeryPOWER PORT,Pacemaker  

Right ring finger surgery.DexCom continueous glucose meter, right abd)


Social history: no significant social history


Family history: no significant family history





Medications and Allergies


                                    Allergies











Allergy/AdvReac Type Severity Reaction Status Date / Time


 


adhesive tape Allergy  Blistering Verified 12/14/21 09:52





   Rash  


 


bupivacaine Allergy  Angioedema, Verified 12/14/21 09:52





   hives  


 


ketorolac [From Toradol] Allergy  Hives Verified 12/13/21 12:19


 


Sulfa (Sulfonamide Allergy  Hives Verified 12/14/21 09:52





Antibiotics)     


 


famotidine [From Pepcid] AdvReac  Vomiting Verified 12/13/21 12:19











                                Home Medications











 Medication  Instructions  Recorded  Confirmed  Last Taken  Type


 


Isosorbide Mononitrate [Isosorbide 120 mg PO DAILY 02/05/15 12/14/21 12/13/21 

06:00 History





Mononitrate ER (IMDUR)]     


 


Cholecalciferol Vit D3 [Vitamin D3 1,000 unit PO QDAY  tablet 08/30/17 12/14/21 12/13/21 09:00 Rx





1,000 UNIT TAB]     


 


Clopidogrel [Plavix] 75 mg PO QDAY  tablet 08/30/17 12/14/21 12/13/21 06:00 Rx


 


Ezetimibe [Zetia] 10 mg PO QDAY  tablet 08/30/17 12/14/21 12/12/21 21:00 Rx


 


Insulin Detemir [Levemir VIAL] 30 unit SQ BID 07/15/20 12/14/21 12/12/21 21:00 

History


 


AtorvaSTATin [Lipitor] 80 mg PO QHS #30 tablet 12/24/20 12/14/21 12/12/21 21:00 

Rx


 


Ferrous Sulfate [Feosol 325 MG tab] 325 mg PO QDAY #30 tablet 12/24/20 12/14/21 12/13/21 09:00 Rx


 


Gabapentin 600 mg PO TID #90 capsule 12/24/20 12/14/21 12/13/21 06:00 Rx


 


Insulin Aspart (Nf) [NovoLOG 100 5 unit SQ AC #1 vial 12/24/20 12/14/21 12/13/21

 06:00 Rx





UNITS/ML VIAL]     


 


Levothyroxine [Synthroid] 100 mcg PO QDAY #30 12/24/20 12/14/21 12/13/21 Rx


 


Nitroglycerin [Nitrostat] 0.4 mg SL Q5M PRN #25 tablet 12/24/20 12/14/21 Unknown

 Rx


 


Potassium Chloride [K-Dur] 20 meq PO BID #30 tab 12/24/20 12/14/21 12/13/21 

06:00 Rx


 


Sertraline [Zoloft] 50 mg PO QDAY #30 tablet 12/24/20 12/14/21 12/13/21 06:00 Rx


 


Spironolactone [Aldactone] 50 mg PO QDAY #30 tablet 12/24/20 12/14/21 12/13/21 

06:00 Rx


 


Torsemide [Demadex] 100 mg PO QDAY #30 12/24/20 12/14/21 1 Day Ago Rx





    ~12/12/21 


 


Zolpidem (Nf) [Ambien (Nf)] 10 mg PO QHS #10 12/24/20 12/14/21 1 Day Ago Rx





    ~12/12/21 


 


Amiodarone HCl [Amiodarone 100  mg PO QDAY 12/14/21 12/14/21 12/13/21 

History





TAB]     


 


Apixaban [Eliquis] 5 mg PO BID #60 tablet 12/14/21  Unknown Rx


 


Aspirin EC [Halfprin EC] 81 mg PO QDAY 12/14/21 12/14/21 12/13/21 History


 


metOLazone [Zaroxolyn] 5 mg PO QDAY 12/14/21 12/14/21 12/13/21 History











Active Meds: 


Active Medications





Acetaminophen (Acetaminophen 325 Mg Tab)  650 mg PO Q4H PRN


   PRN Reason: Pain MILD(1-3)/Fever >100.5/HA


Acetaminophen (Acetaminophen 325 Mg Tab)  650 mg PO Q6H PRN


   PRN Reason: Pain, Mild (1-3)


Aspirin (Aspirin Ec 325 Mg Tab)  325 mg PO QDAY MARI


Dextrose (Dextrose 50% In Water (25gm) 50 Ml Syringe)  50 ml IV Q30MIN PRN; 

Protocol


   PRN Reason: Hypoglycemia


Heparin Sodium (Porcine) (Heparin 5,000 Unit/1 Ml Vial)  5,000 unit SUB-Q Q8HR 

MARI


Insulin Human Lispro (Insulin Lispro 100 Unit/Ml)  0 unit SUB-Q ACHS MARI; 

Protocol


Magnesium Hydroxide (Magnesium Hydroxide (Mom) Oral Liqd Udc)  30 ml PO Q4H PRN


   PRN Reason: Constipation


Morphine Sulfate (Morphine 2 Mg/1 Ml Inj)  2 mg IV Q4H PRN


   PRN Reason: Pain, Moderate (4-6)


Morphine Sulfate (Morphine 4 Mg/1 Ml Inj)  4 mg IV Q4H PRN


   PRN Reason: Pain , Severe (7-10)


Morphine Sulfate (Morphine 4 Mg/1 Ml Inj)  2 mg IV Q5MIN PRN


   PRN Reason: Chest Pain unrelieved by NTG


Ondansetron HCl (Ondansetron 4 Mg/2 Ml Inj)  4 mg IV Q8H PRN


   PRN Reason: Nausea And Vomiting


Sodium Chloride (Sodium Chloride 0.9% 10 Ml Flush Syringe)  10 ml IV BID MARI


Sodium Chloride (Sodium Chloride 0.9% 10 Ml Flush Syringe)  10 ml IV PRN PRN


   PRN Reason: LINE FLUSH


Sodium Chloride (Sodium Chloride 0.9% 10 Ml Flush Syringe)  10 ml IV PRN PRN


   PRN Reason: LINE FLUSH


Tramadol HCl (Tramadol 50 Mg Tab)  50 mg PO Q6H PRN


   PRN Reason: Pain, Moderate (4-6)











Review of Systems


Constitutional: no fever, no chills


Ears, nose, mouth and throat: no nasal congestion, no sore throat


Cardiovascular: chest pain, no orthopnea, no palpitations


Respiratory: shortness of breath, no cough


Gastrointestinal: nausea, no abdominal pain, no vomiting, no diarrhea


Genitourinary Female: no pelvic pain, no flank pain, no dysuria, no hematuria


Musculoskeletal: no neck pain, no low back pain


Neurological: no headaches, no confusion


Psychiatric: no anxiety, no depression


Endocrine: no polyphagia, no polydipsia, no polyuria, no nocturia





Exam





- Constitutional


Vitals: 


                                        











Temp Pulse Resp BP Pulse Ox


 


 97.0 F L  94 H  18   129/77   96 


 


 04/13/22 20:03  04/13/22 20:03  04/13/22 20:03  04/13/22 20:01  04/13/22 20:03











General appearance: Present: no acute distress, well-nourished





- EENT


Eyes: Present: PERRL, EOM intact.  Absent: scleral icterus


ENT: hearing intact, clear oral mucosa, dentition normal





- Neck


Neck: Present: supple, normal ROM





- Respiratory


Respiratory effort: normal


Respiratory: bilateral: CTA





- Cardiovascular


Rhythm: regular


Heart Sounds: Present: S1 & S2.  Absent: gallop, systolic murmur, diastolic 

murmur, rub, click





- Extremities


Extremities: no ischemia, pulses intact, pulses symmetrical, No edema, normal 

temperature, normal color, Full ROM


Peripheral Pulses: within normal limits





- Abdominal


General gastrointestinal: Present: soft, non-tender, non-distended, normal bowel

 sounds.  Absent: mass





- Integumentary


Integumentary: Present: clear, warm, dry, normal turgor.  Absent: rash





- Musculoskeletal


Musculoskeletal: strength equal bilaterally





- Psychiatric


Psychiatric: appropriate mood/affect, intact judgment & insight, memory intact, 

cooperative





- Neurologic


Neurologic: CNII-XII intact, no focal deficits, moves all extremities





HEART Score





- HEART Score


History: Moderately suspicious


EKG: Normal


Age: 45-65


Risk factors: 1-2 risk factors


Troponin: 


                                        











Troponin T  < 0.010 ng/mL (0.00-0.029)   04/13/22  23:05    











Troponin: < normal limit


HEART Score: 3





- Critical Actions


Critical Actions: 4-6 pts:12-16.6% risk of adverse cardiac event. Should be 

admitted





Results





- Labs


CBC & Chem 7: 


                                 04/13/22 19:06





                                 04/13/22 19:06


Labs: 


                              Abnormal lab results











  04/13/22 04/13/22 Range/Units





  19:06 19:06 


 


Hgb  15.0 H   (10.1-14.3)  gm/dl


 


Hct  45.8 H   (30.3-42.9)  %


 


MCV  101 H   (79-97)  fl


 


MCH  33 H   (28-32)  pg


 


Mono % (Auto)  8.1 H   (0.0-7.3)  %


 


Eos % (Auto)  5.6 H   (0.0-4.3)  %


 


Eos # (Auto)  0.5 H   (0.0-0.4)  K/mm3


 


Potassium   3.3 L  (3.6-5.0)  mmol/L


 


BUN   30 H  (7-17)  mg/dL


 


Creatinine   1.4 H  (0.6-1.2)  mg/dL


 


Glucose   103 H  ()  mg/dL


 


NT-Pro-B Natriuret Pep   1292 H  (0-450)  pg/mL


 


Albumin   3.7 L  (3.9-5)  g/dL














Assessment and Plan





- Patient Problems


(1) Acute chest pain


Current Visit: Yes   Status: Acute   


Plan to address problem: 


Patient admitted and placed on telemetry.


We will check serial cardiac enzymes.


Patient will be placed on daily aspirin, sublingual nitroglycerin and IV 

morphine as needed for chest pain.


We will await for evaluation and recommendations from cardiology.








(2) Acute on chronic systolic HF (heart failure)


Current Visit: No   Status: Acute   


Plan to address problem: 


Patient on diuretics.


We will schedule for echocardiogram.








(3) Renal insufficiency


Current Visit: No   Status: Acute   


Plan to address problem: 


This appears chronic.


We monitor BUN and creatinine.








(4) CAD (coronary artery disease)


Current Visit: No   Status: Chronic   


Qualifiers: 


   Coronary Disease-Associated Artery/Lesion type: native artery   Native vs. 

transplanted heart: native heart   Associated angina: with stable angina   

Qualified Code(s): I25.118 - Atherosclerotic heart disease of native coronary 

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   


Plan to address problem: 


We will resume routine home medications.


Patient follows up with [facility].








(5) Diabetes


Current Visit: No   Status: Chronic   


Qualifiers: 


   Diabetes mellitus type: type 2   Diabetes mellitus complication status: with 

neurologic complications   Diabetes mellitus complication detail: with 

unspecified neuropathy   Qualified Code(s): E11.40 - Type 2 diabetes mellitus 

with diabetic neuropathy, unspecified   


Plan to address problem: 


Patient placed on sliding scale insulin.


We will monitor Accu-Cheks closely.








(6) Hyperlipidemia


Current Visit: No   Status: Chronic   


Qualifiers: 


   Hyperlipidemia type: mixed hyperlipidemia   Qualified Code(s): E78.2 - Mixed 

hyperlipidemia   


Plan to address problem: 


We will resume routine home medications and monitor lipid profile.








(7) Hypothyroidism


Current Visit: No   Status: Chronic   


Plan to address problem: 


Continue routine home medications and monitor TSH.








(8) DVT prophylaxis


Current Visit: No   Status: Acute   


Plan to address problem: 


Patient placed on subcutaneous heparin.








(9) Full code status


Current Visit: No   Status: Acute   


Plan to address problem: 


Patient is full code.

## 2022-04-14 NOTE — ELECTROCARDIOGRAPH REPORT
[facility]

                                       

Test Date:    2022               Test Time:    10:41:14

Pat Name:     JULITA LOVE             Department:   

Patient ID:   [id number]          Room:         A465 1

Gender:       F                        Technician:   MARKELL

:          1975               Requested By: VINICIO JACKSON

Order Number: [id number]              Reading MD:   Eduardo Foster

                                 Measurements

Intervals                              Axis          

Rate:         82                       P:            53

PA:           230                      QRS:          12

QRSD:         126                      T:            28

QT:           438                                    

QTc:          513                                    

                           Interpretive Statements

Sinus rhythm

Ventricular premature complex

Low voltage QRS

Old anterior myocardial infarction

Compared to ECG 2022 08:53:03

No significant change

Electronically Signed On 2022 20:36:41 EDT by Eduardo Foster

## 2022-04-14 NOTE — CONSULTATION
History of Present Illness


Consult date: 04/14/22


Requesting physician: VINICIO JACKSON


Consult reason: chest pain


History of present illness: 


Patient is a 46-year-old female with a past medical history of HFrEF s/p AICD, 

coronary artery disease s/p multiple PCI, ischemic cardiomyopathy, CKD, 

hypertension, morbid obesity, chronic pain syndrome, ICD lead thrombus (previo

usly on Coumadin), hypothyroidism who presented to the ED with a complaint of 

chest pain which started yesterday morning.  Patient describes a as sharp and 

stabbing in nature and rated as a pain 10 out of 10 located in her left side of 

her chest.  She associates the pain with some shortness of breath, dizziness, 

nausea with no vomiting.  Patient states pain was relieved with morphine.  At 

time of interview patient was chest pain-free.  Patient follows with [facility]

[facility].  Patient is also scheduled for new ICD lead later this month.  

Patient was previously seen by our practice in prior admissions for similar 

complaints.  Cardiology was consulted for chest pain.








Past History


Past Medical History: acute MI, diabetes, hypertension, renal failure, other 

(CAD, gatroparesis, hypOthyroidism, retinopathy, neuropathy, elevated fernando

sterol.  15 stents. " BLOOD CLOT IN HEART.')


Past Surgical History: Other ( tubal ligation, left fallopian tube removed, 

right knee surgery, right arm surgery, left eye surgeryPOWER PORT,Pacemaker  

Right ring finger surgery.DexCom continueous glucose meter, right abd)


Social history: no significant social history


Family history: no significant family history





Medications and Allergies


                                    Allergies











Allergy/AdvReac Type Severity Reaction Status Date / Time


 


adhesive tape Allergy  Blistering Verified 04/14/22 14:02





   Rash  


 


bupivacaine Allergy  Angioedema, Verified 04/14/22 14:02





   hives  


 


ketorolac [From Toradol] Allergy  Hives Verified 04/14/22 14:02


 


Sulfa (Sulfonamide Allergy  Hives Verified 04/14/22 14:02





Antibiotics)     


 


famotidine [From Pepcid] AdvReac  Vomiting Verified 04/14/22 14:02











                                Home Medications











 Medication  Instructions  Recorded  Confirmed  Last Taken  Type


 


Isosorbide Mononitrate [Isosorbide 120 mg PO DAILY 02/05/15 04/14/22 12/13/21 

06:00 History





Mononitrate ER (IMDUR)]     


 


Cholecalciferol Vit D3 [Vitamin D3 1,000 unit PO QDAY  tablet 08/30/17 04/14/22 12/13/21 09:00 Rx





1,000 UNIT TAB]     


 


Clopidogrel [Plavix] 75 mg PO QDAY  tablet 08/30/17 04/14/22 12/13/21 06:00 Rx


 


AtorvaSTATin [Lipitor] 80 mg PO QHS #30 tablet 12/24/20 04/14/22 12/12/21 21:00 

Rx


 


Gabapentin 600 mg PO TID #90 capsule 12/24/20 04/14/22 12/13/21 06:00 Rx


 


Nitroglycerin [Nitrostat] 0.4 mg SL Q5M PRN #25 tablet 12/24/20 04/14/22 Unknown

 Rx


 


Spironolactone [Aldactone] 50 mg PO QDAY #30 tablet 12/24/20 04/14/22 12/13/21 

06:00 Rx


 


Torsemide [Demadex] 100 mg PO QDAY #30 12/24/20 04/14/22 1 Day Ago Rx





    ~12/12/21 


 


Amiodarone HCl [Amiodarone 100  mg PO QDAY 12/14/21 04/14/22 12/13/21 

History





TAB]     


 


Aspirin EC [Halfprin EC] 81 mg PO QDAY 12/14/21 04/14/22 12/13/21 History


 


metOLazone [Zaroxolyn] 5 mg PO QWEEK 12/14/21 04/14/22 12/13/21 History


 


ALPRAZolam [Xanax TAB] 0.5 mg PO TID PRN 04/14/22 04/14/22 Unknown History


 


Dapagliflozin Propanediol [Farxiga] 10 mg PO QDAY 04/14/22 04/14/22 Unknown His

tory


 


Insulin Aspart 5 unit SQ AC 04/14/22 04/14/22 Unknown History


 


Insulin Glargine [Lantus VIAL] 30 units SUB-Q BID 04/14/22 04/14/22 Unknown 

History


 


Levocetirizine Dihydrochloride 5 mg PO QDAY 04/14/22 04/14/22 Unknown History





[Xyzal]     


 


Levothyroxine [Synthroid] 125 mcg PO QAM 04/14/22 04/14/22 Unknown History


 


Montelukast [Singulair] 10 mg PO QDAY 04/14/22 04/14/22 Unknown History


 


Oxycodone HCl/Acetaminophen 1 each PO BID PRN 04/14/22 04/14/22 Unknown History





[Oxycodone-Acetaminophen ]     


 


Sertraline [Zoloft] 100 mg PO QDAY 04/14/22 04/14/22 Unknown History


 


Sucralfate [Carafate] 1 gm PO QID 04/14/22 04/14/22 Unknown History


 


Zolpidem Tartrate 10 mg PO QDAY 04/14/22 04/14/22 Unknown History











Active Meds: 


Active Medications





Acetaminophen (Acetaminophen 325 Mg Tab)  650 mg PO Q4H PRN


   PRN Reason: Pain MILD(1-3)/Fever >100.5/HA


Amiodarone HCl (Amiodarone 200 Mg Tab)  100 mg PO QDAY [facility]


   Last Admin: 04/14/22 12:30 Dose:  100 mg


   


Apixaban (Apixaban 5 Mg Tab)  5 mg PO BID [facility]


   Last Admin: 04/14/22 11:46 Dose:  5 mg


   


Aspirin (Aspirin Ec 81 Mg Tab)  81 mg PO QDAY [facility]


   Last Admin: 04/14/22 11:45 Dose:  81 mg


   


Clopidogrel Bisulfate (Clopidogrel 75 Mg Tab)  75 mg PO QDAY [facility]


   Last Admin: 04/14/22 11:45 Dose:  75 mg


   


Dextrose (Dextrose 50% In Water (25gm) 50 Ml Syringe)  50 ml IV Q30MIN PRN; 

Protocol


   PRN Reason: Hypoglycemia


Diphenhydramine HCl (Diphenhydramine 50 Mg/Ml Vial)  25 mg IV Q6H PRN


   PRN Reason: Itching


   Last Admin: 04/14/22 10:00 Dose:  25 mg


   


Insulin Human Lispro (Insulin Lispro 100 Unit/Ml)  0 unit SUB-Q [facility]; 

Protocol


   Last Admin: 04/14/22 11:40 Dose:  Not Given


   


Levothyroxine Sodium (Levothyroxine 100 Mcg Tab)  100 mcg PO QDAY@0600 [facility]


   Last Admin: 04/14/22 12:24 Dose:  100 mcg


   


Magnesium Hydroxide (Magnesium Hydroxide (Mom) Oral Liqd Udc)  30 ml PO Q4H PRN


   PRN Reason: Constipation


Morphine Sulfate (Morphine 2 Mg/1 Ml Inj)  2 mg IV Q4H PRN


   PRN Reason: Pain, Moderate (4-6)


   Last Admin: 04/14/22 04:23 Dose:  2 mg


   


Morphine Sulfate (Morphine 4 Mg/1 Ml Inj)  4 mg IV Q4H PRN


   PRN Reason: Pain , Severe (7-10)


Morphine Sulfate (Morphine 4 Mg/1 Ml Inj)  2 mg IV Q5MIN PRN


   PRN Reason: Chest Pain unrelieved by NTG


Ondansetron HCl (Ondansetron 4 Mg/2 Ml Inj)  4 mg IV Q8H PRN


   PRN Reason: Nausea And Vomiting


Potassium Chloride (Potassium Chloride Er 20 Meq Tab)  20 meq PO ONCE ONE


   Stop: 04/14/22 12:15


Sertraline HCl (Sertraline 50 Mg Tab)  50 mg PO QDAY [facility]


   Last Admin: 04/14/22 11:46 Dose:  50 mg


   


Sodium Chloride (Sodium Chloride 0.9% 10 Ml Flush Syringe)  10 ml IV BID [facility]


   Last Admin: 04/14/22 11:47 Dose:  10 ml


   


Sodium Chloride (Sodium Chloride 0.9% 10 Ml Flush Syringe)  10 ml IV PRN PRN


   PRN Reason: LINE FLUSH


Tramadol HCl (Tramadol 50 Mg Tab)  50 mg PO Q6H PRN


   PRN Reason: Pain, Moderate (4-6)











Review of Systems


Constitutional: no weight loss, no weight gain, no fever


Ears, nose, mouth and throat: no sinus pressure, no sinus pain


Cardiovascular: chest pain, lightheadedness, shortness of breath, no orthopnea, 

no palpitations, no rapid/irregular heart beat, no edema


Respiratory: shortness of breath, no cough, no cough with sputum


Gastrointestinal: nausea, no abdominal pain, no vomiting, no diarrhea


Musculoskeletal: no neck stiffness, no neck pain


Integumentary: no rash, no pruritis, no redness


Neurological: no head injury, no transient paralysis


Psychiatric: no anxiety, no memory loss


Endocrine: no cold intolerance, no heat intolerance


Hematologic/Lymphatic: no easy bruising, no easy bleeding





Physical Examination


                                   Vital Signs











BP


 


 122/68 


 


 04/13/22 17:54











General appearance: no acute distress


Neck: Positive: trachea midline


Cardiac: Positive: Reg Rate and Rhythm


Lungs: Positive: clear to auscultation, Normal Breath Sounds


Neuro: Positive: Grossly Intact


Abdomen: Positive: Soft


Extremities: Present: upper extr. pulses.  Absent: edema





Results





                                04/14/22 Unknown





                                04/14/22 Unknown


                                 Cardiac Enzymes











  04/13/22 Range/Units





  19:06 


 


AST  27  (5-40)  units/L








                                   Coagulation











  04/13/22 Range/Units





  20:22 


 


PT  13.2  (12.2-14.9)  Sec.


 


INR  0.91  (0.87-1.13)  


 


APTT  25.8  (24.2-36.6)  Sec.








                                       CBC











  04/13/22 04/14/22 Range/Units





  19:06 Unknown 


 


WBC  9.8  8.8  (4.5-11.0)  K/mm3


 


RBC  4.53  4.23  (3.65-5.03)  M/mm3


 


Hgb  15.0 H  13.9  (10.1-14.3)  gm/dl


 


Hct  45.8 H  42.6  (30.3-42.9)  %


 


Plt Count  266  230  (140-440)  K/mm3


 


Lymph # (Auto)  2.2  1.9  (1.2-5.4)  K/mm3


 


Mono # (Auto)  0.8  0.8  (0.0-0.8)  K/mm3


 


Eos # (Auto)  0.5 H  0.3  (0.0-0.4)  K/mm3


 


Baso # (Auto)  0.1  0.1  (0.0-0.1)  K/mm3








                          Comprehensive Metabolic Panel











  04/13/22 04/14/22 Range/Units





  19:06 Unknown 


 


Sodium  140  136 L  (137-145)  mmol/L


 


Potassium  3.3 L  3.4 L  (3.6-5.0)  mmol/L


 


Chloride  100.5  97.0 L  ()  mmol/L


 


Carbon Dioxide  26  24  (22-30)  mmol/L


 


BUN  30 H  30 H  (7-17)  mg/dL


 


Creatinine  1.4 H  1.4 H  (0.6-1.2)  mg/dL


 


Glucose  103 H  237 H  ()  mg/dL


 


Calcium  8.9  7.9 L  (8.4-10.2)  mg/dL


 


AST  27   (5-40)  units/L


 


ALT  30   (7-56)  units/L


 


Alkaline Phosphatase  73   ()  units/L


 


Total Protein  7.0   (6.3-8.2)  g/dL


 


Albumin  3.7 L   (3.9-5)  g/dL














- Imaging and Cardiology


Echo: report reviewed





EKG interpretations





- Telemetry


EKG Rhythm: Sinus Rhythm





- EKG


Sinus rhythms and dysrhythmias: sinus rhythm





Assessment and Plan





Patient is a 46-year-old female with a past medical history of HFrEF s/p AICD, 

coronary artery disease s/p multiple PCI, ischemic cardiomyopathy, CKD, 

hypertension, morbid obesity, chronic pain syndrome, ICD lead thrombus 

(previously on Coumadin), hypothyroidism who presented to the ED with a 

complaint of chest pain which started morning prior to admission


Atypical Chest pain


Chronic HFrEF-on Torsemide 100mg daily as an outpatient 


ICMP s/p AICD, CardioMEMS


ICD Lead Thrombus Previously on Coumadin for thrombus on lead since 11/2020


H/o MI/CAD s/p PCI 2017 -h/o multiple stents & in-stent restenoses, 

severesmall vessel diseasenot amenable to interventionon cath 6/2019


Chronic Stable Angina


H/o Frequent VT/ICD Discharge - on PO [id number] daily as an outpatient


RACHEL onCKD


HTN


IDDM/Neuropathy


Hypothyroidism- on Synthroid


Morbid Obesity


Chronic Pain Syndrome


Anxiety/Depression


H/o COVID-19 Infection 1/2021


Noncompliance





Echo 4/14/2022-EF 15 to 20%.  Severe diastolic dysfunction is present 

restrictive filling pattern.  There is a small mobile echodensity in right 

atrium likely involving the pacemaker lead which appears to be chronic in 

nature.  On review of echo from 12/24/2020 the same mass was present on 

pacemaker lead however at that time the mass was significantly more prominent 

and mobile.  Mass has significantly improved from prior echo. 


Lexiscan MPI stress test 4/14/2022- large fixed defect in anterior with no 

reversible signs of ischemia


Cardiac PET stress test 5/20/2020-large 20% defect of the basal to apical 

anterior and mid anterior lateral walls consistent with myocardial infarction 

with some degree of noel-infarct ischemia.  There is a second large 20% defect 

over the basal to apical inferior lateral and apical lateral walls consistent 

with myocardial infarction.  Lastly there is a small 8% mid septal wall defect 

consistent with myocardial ischemia.  When compared to prior studies only the 

small degree of septal ischemia is new.  EF 26%.


Outpatient medications: Entresto 24 to 26 mg p.o. twice daily, torsemide 100 mg 

p.o. daily, Plavix 75 mg p.o. daily, Imdur 120 mg p.o. daily, spironolactone 50 

mg p.o. daily, amiodarone 100 mg p.o. daily





Plan:


EKG shows sinus rhythm 81 with PVCs.  No acute ischemic changes.  Troponins 

negative x3.  AMI ruled out


Patient went for echo and stress test this a.m.  Results noted above


Patient BNP noted to be elevated however patient appears euvolemic on exam with 

lungs clear to auscultation, no bilateral lower extremity edema, no complaints 

of shortness of breath, and chest x-ray negative


Patient had negative stress test and at this time is chest pain-free


Patient may resume outpatient medications


Cardiac status otherwise stable for discharge.  We will see as needed








Patient to follow-up with their primary cardiologist in 1 to 2 weeks after disch

arge


Patient conjunction with Dr. Black who agrees with this plan of care





- Patient Problems


(1) Acute chest pain


Current Visit: Yes   Status: Acute   





(2) AICD (automatic cardioverter/defibrillator) present


Current Visit: Yes   Status: Chronic   





(3) Hypokalemia


Current Visit: No   Status: Acute   





(4) Thrombosis involving cardiac device


Current Visit: No   Status: Acute   


Qualifiers: 


   Encounter type: subsequent encounter   Qualified Code(s): T82.867D - Thromb

osis due to cardiac prosthetic devices, implants and grafts, subsequent 

encounter   





(5) Automatic implantable cardioverter-defibrillator in situ


Current Visit: No   Status: Chronic   





(6) CAD (coronary artery disease)


Current Visit: No   Status: Chronic   


Qualifiers: 


   Coronary Disease-Associated Artery/Lesion type: native artery   Native vs. 

transplanted heart: native heart   Associated angina: with stable angina   

Qualified Code(s): I25.118 - Atherosclerotic heart disease of native coronary 

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   





(7) Diabetes


Current Visit: No   Status: Chronic   


Qualifiers: 


   Diabetes mellitus type: type 2   Diabetes mellitus complication status: with 

neurologic complications   Diabetes mellitus complication detail: with 

unspecified neuropathy   Qualified Code(s): E11.40 - Type 2 diabetes mellitus 

with diabetic neuropathy, unspecified   





(8) H/O percutaneous transluminal coronary angioplasty


Current Visit: No   Status: Chronic   





(9) Hx of heart artery stent


Current Visit: No   Status: Chronic   





(10) Hyperlipidemia


Current Visit: No   Status: Chronic   


Qualifiers: 


 





(11) Hypertension


Current Visit: No   Status: Chronic   





(12) Ischemic cardiomyopathy


Current Visit: No   Status: Chronic   





(13) Obesity hypoventilation syndrome


Current Visit: No   Status: Chronic

## 2022-04-14 NOTE — EVENT NOTE
Date: 04/14/22





Patient seen and examined


This is the second IMS visit of the day


Status post stress test


Showed a fixed defect


Discussed with cardiology PA and from their point of view patient can be 

discharged


However patient insists she cannot go as she continues to have left-sided chest 

pain and she wants to observe overnight


There is no chest wall tenderness


Agrees to go home tomorrow


Potassium supplemented


She wants Benadryl increased to 50 mg IV ??

## 2022-04-14 NOTE — ELECTROCARDIOGRAPH REPORT
[facility]

                                       

Test Date:    2022               Test Time:    16:31:31

Pat Name:     JULITA LOVE             Department:   

Patient ID:   [id number]          Room:         A465 1

Gender:       F                        Technician:   ISH

:          1975               Requested By: BRIAN JOLLY

Order Number: [id number]              Reading MD:   Edurado Foster

                                 Measurements

Intervals                              Axis          

Rate:         81                       P:            46

IN:           186                      QRS:          54

QRSD:         128                      T:            20

QT:           422                                    

QTc:          489                                    

                           Interpretive Statements

Sinus rhythm

Occasional ventricular ectopics

Poor R wave progression, possible old anterior infarct

Nonspecific intraventricular conduction delay

Low voltage QRS

Compared to ECG 2022 08:53:03

No significant change

Electronically Signed On 2022 20:29:17 EDT by Eduardo Foster

## 2022-04-15 VITALS — SYSTOLIC BLOOD PRESSURE: 107 MMHG | DIASTOLIC BLOOD PRESSURE: 62 MMHG

## 2022-04-15 LAB
BASOPHILS # (AUTO): 0.1 K/MM3 (ref 0–0.1)
BASOPHILS NFR BLD AUTO: 1.4 % (ref 0–1.8)
BUN SERPL-MCNC: 21 MG/DL (ref 7–17)
BUN/CREAT SERPL: 18 %
CALCIUM SERPL-MCNC: 9.2 MG/DL (ref 8.4–10.2)
EOSINOPHIL # BLD AUTO: 1 K/MM3 (ref 0–0.4)
EOSINOPHIL NFR BLD AUTO: 12.8 % (ref 0–4.3)
HCT VFR BLD CALC: 44.9 % (ref 30.3–42.9)
HEMOLYSIS INDEX: 20
HGB BLD-MCNC: 15.2 GM/DL (ref 10.1–14.3)
LYMPHOCYTES # BLD AUTO: 1.4 K/MM3 (ref 1.2–5.4)
LYMPHOCYTES NFR BLD AUTO: 18.6 % (ref 13.4–35)
MCHC RBC AUTO-ENTMCNC: 34 % (ref 30–34)
MCV RBC AUTO: 100 FL (ref 79–97)
MONOCYTES # (AUTO): 0.6 K/MM3 (ref 0–0.8)
MONOCYTES % (AUTO): 7.5 % (ref 0–7.3)
PLATELET # BLD: 229 K/MM3 (ref 140–440)
RBC # BLD AUTO: 4.51 M/MM3 (ref 3.65–5.03)

## 2022-04-15 RX ADMIN — ASPIRIN SCH MG: 81 TABLET, COATED ORAL at 09:17

## 2022-04-15 RX ADMIN — AMIODARONE HYDROCHLORIDE SCH MG: 200 TABLET ORAL at 09:17

## 2022-04-15 RX ADMIN — Medication SCH ML: at 09:18

## 2022-04-15 RX ADMIN — LEVOTHYROXINE SODIUM SCH MCG: 0.1 TABLET ORAL at 06:00

## 2022-04-15 RX ADMIN — OXYCODONE AND ACETAMINOPHEN PRN TAB: 5; 325 TABLET ORAL at 09:31

## 2022-04-15 RX ADMIN — SERTRALINE SCH MG: 50 TABLET, FILM COATED ORAL at 09:16

## 2022-04-15 RX ADMIN — OXYCODONE AND ACETAMINOPHEN PRN TAB: 5; 325 TABLET ORAL at 03:35

## 2022-04-15 RX ADMIN — CLOPIDOGREL BISULFATE SCH MG: 75 TABLET ORAL at 09:17

## 2022-04-15 RX ADMIN — INSULIN LISPRO SCH UNIT: 100 INJECTION, SOLUTION INTRAVENOUS; SUBCUTANEOUS at 12:58

## 2022-04-15 RX ADMIN — APIXABAN SCH MG: 5 TABLET, FILM COATED ORAL at 09:16

## 2022-04-15 RX ADMIN — GABAPENTIN SCH MG: 300 CAPSULE ORAL at 13:01

## 2022-04-15 RX ADMIN — GABAPENTIN SCH MG: 300 CAPSULE ORAL at 09:16

## 2022-04-15 RX ADMIN — INSULIN LISPRO SCH: 100 INJECTION, SOLUTION INTRAVENOUS; SUBCUTANEOUS at 09:18

## 2022-04-15 NOTE — TREADMILL REPORT
DATE OF SERVICE: 04/14/2022



NUCLEAR PERFUSION SCAN



REFERRING PHYSICIAN:  Hospitalist service.



PROTOCOL:  The patient was assessed in postoperative state, given 10 mCi of 

technetium at rest.  The patient underwent Lexiscan stress test per standard 

protocol.  At peak stress, the patient given 26 mCi.  Stress imaging was 

performed.



INTERPRETATION:  Technically adequate study.  There is a large densely fixed 

anterior, anteroapical and lateral defect.  This is consistent with prior 

myocardial infarction.  No evidence of a significant degree of ischemia, dilated

left ventricular chamber with severe global left ventricular hypokinesis with a 

calculated ejection fraction of 23%.



CONCLUSIONS:

1.  Abnormal myocardial perfusion scan with a large densely fixed anterior, 

anteroapical and lateral defect consistent with prior myocardial infarction.  No

evidence of a significant degree of ischemia is demonstrated.

2.  Mildly dilated left ventricle with severe global left ventricular 

hypokinesis with a calculated ejection fraction of 23%.







DD: 04/14/2022 11:35 AM

DT: 04/15/2022 04:49 AM

TID: [id number] RECEIPT: [id number]

BRAD/AMISH/LAURA

## 2022-04-15 NOTE — DISCHARGE SUMMARY
Providers





- Providers


Date of Admission: 


04/14/22 00:03





Date of discharge: 04/15/22


Attending physician: 


BOB MCCORMICK





                                        





04/14/22


Consult to Cardiac Rehabilitation [CONS] Routine 


   Reason For Exam: Phase I





04/14/22 00:03


Consult to Cardiology [CONS] Routine 


   Consulting Provider: FADI HARRIS


   Reason For Exam: Chest pain


Consult to Dietitian/Nutrition [CONS] Routine 


   Physician Instructions: 


   Reason For Exam: 


   Reason for Consult: Diet education











Primary care physician: 


PRIMARY CARE MD








Hospitalization


Condition: Stable


Hospital course: 





Patient is a 46-year-old female with a past medical history of HFrEF s/p AICD, 

coronary artery disease s/p multiple PCI, ischemic cardiomyopathy, CKD, 

hypertension, morbid obesity, chronic pain syndrome, ICD lead thrombus 

(previously on Coumadin), hypothyroidism who presented to the ED with a 

complaint of chest pain which started morning prior to admission.


Echo 4/14/2022-EF 15 to 20%.  Severe diastolic dysfunction is present 

restrictive filling pattern.  There is a small mobile echodensity in right 

atrium likely involving the pacemaker lead which appears to be chronic in 

nature.  On review of echo from 12/24/2020 the same mass was present on 

pacemaker lead however at that time the mass was significantly more prominent 

and mobile.  Mass has significantly improved from prior echo. 


Lexiscan MPI stress test 4/14/2022- large fixed defect in anterior with no 

reversible signs of ischemia


Cardiac PET stress test 5/20/2020-large 20% defect of the basal to apical 

anterior and mid anterior lateral walls consistent with myocardial infarction 

with some degree of noel-infarct ischemia.  There is a second large 20% defect 

over the basal to apical inferior lateral and apical lateral walls consistent 

with myocardial infarction.  Lastly there is a small 8% mid septal wall defect 

consistent with myocardial ischemia.  When compared to prior studies only the 

small degree of septal ischemia is new.  EF 26%.


Outpatient medications: Entresto 24 to 26 mg p.o. twice daily, torsemide 100 mg 

p.o. daily, Plavix 75 mg p.o. daily, Imdur 120 mg p.o. daily, spironolactone 50 

mg p.o. daily, amiodarone 100 mg p.o. daily





Plan:


EKG shows sinus rhythm 81 with PVCs.  No acute ischemic changes.  Troponins 

negative x3.  AMI ruled out


Patient went for echo and stress test this a.m.  Results noted above


Patient BNP noted to be elevated however patient appears euvolemic on exam with 

lungs clear to auscultation, no bilateral lower extremity edema, no complaints 

of shortness of breath, and chest x-ray negative


Patient had negative stress test and at this time is chest pain-free


Patient may resume outpatient medications


Cardiac status otherwise stable for discharge.  We will see as needed


Disposition: 01 HOME / SELF CARE / HOMELESS


Final Discharge Diagnosis (Prints w/discharge instructions): Atypical Chest 

pain, likely GERD.  Chronic HFrEF-on Torsemide 100mg daily as an outpatient.  

ICMP s/p AICD, CardioMEMS.  ICD Lead Thrombus Previously on Coumadin for 

thrombus on lead since 11/2020.  H/o MI/CAD s/p PCI 2017 -h/o multiple stents &

 in-stent restenoses, severesmall vessel diseasenot amenable to 

interventionon cath 6/2019.  Chronic Stable Angina.  H/o Frequent VT/ICD 

Discharge - on PO [id number] daily as an outpatient.  RACHEL onCKD.  HTN.  

IDDM/Neuropathy.  Hypothyroidism- on Synthroid.  Morbid Obesity.  Chronic 

Pain Syndrome.  Anxiety/Depression.  H/o COVID-19 Infection 1/2021.  

Noncompliance


Time spent for discharge: 34 minutes





Exam





- Constitutional


Vitals: 


                                        











Temp Pulse Resp BP Pulse Ox


 


 98.8 F   71   18   107/62   99 


 


 04/15/22 08:28  04/15/22 08:28  04/15/22 04:00  04/15/22 08:28  04/15/22 08:28














Plan


Activity: advance as tolerated


Weight Bearing Status: Weight Bear as Tolerated


Diet: low fat, low salt


Follow up with: 


PRIMARY CARE,MD [Primary Care Provider] - 3-5 Days


BEN NORIEGA MD [Staff Physician] - 7 Days


Prescriptions: 


Spironolactone [Aldactone] 25 mg PO QDAY #30 tablet


Torsemide [Demadex] 100 mg PO QDAY #30


Pantoprazole [Protonix] 40 mg PO QDAY #30 tablet

## 2022-06-24 ENCOUNTER — HOSPITAL ENCOUNTER (EMERGENCY)
Dept: HOSPITAL 5 - ED | Age: 47
Discharge: LEFT BEFORE DISCHARGE | End: 2022-06-24
Payer: MEDICARE

## 2022-06-24 VITALS — SYSTOLIC BLOOD PRESSURE: 146 MMHG | DIASTOLIC BLOOD PRESSURE: 82 MMHG

## 2022-06-24 DIAGNOSIS — Z53.21: ICD-10-CM

## 2022-06-24 DIAGNOSIS — R06.02: ICD-10-CM

## 2022-06-24 DIAGNOSIS — R07.9: Primary | ICD-10-CM

## 2022-06-24 PROCEDURE — 82962 GLUCOSE BLOOD TEST: CPT

## 2022-06-24 PROCEDURE — 71046 X-RAY EXAM CHEST 2 VIEWS: CPT

## 2022-06-24 PROCEDURE — 93005 ELECTROCARDIOGRAM TRACING: CPT

## 2022-06-24 NOTE — XRAY REPORT
CHEST 2 VIEWS 



INDICATION: 

chest pain.



COMPARISON: 

5/13/2022



FINDINGS:



SUPPORT DEVICES: Stable satisfactory device positioning.



HEART: Within normal limits. 



LUNGS/PLEURA: No acute air space or interstitial disease.  No pneumothorax.



ADDITIONAL FINDINGS: None.







IMPRESSION:

1. No acute findings.



Signer Name: Otis Preston MD 

Signed: 6/24/2022 12:11 PM

Workstation Name: "[email or web address], Mobile [employer]"

## 2022-06-24 NOTE — ELECTROCARDIOGRAPH REPORT
[facility]

                                       

Test Date:    2022               Test Time:    11:46:53

Pat Name:     JULITA LOVE             Department:   

Patient ID:   [id number]          Room:          

Gender:       F                        Technician:   ISH

:          1975               Requested By: ED DOC

Order Number: [id number]              Reading MD:   Eduardo Foster

                                 Measurements

Intervals                              Axis          

Rate:         73                       P:            50

RI:           193                      QRS:          69

QRSD:         114                      T:            -26

QT:           456                                    

QTc:          502                                    

                           Interpretive Statements

Sinus rhythm

Left atrial enlargement

Incomplete left bundle branch block

Anterior infarct, old

Compared to ECG 2022 10:36:47

No significant change

Electronically Signed On 2022 13:55:26 EDT by Eduardo Foster

## 2022-09-30 ENCOUNTER — HOSPITAL ENCOUNTER (EMERGENCY)
Dept: HOSPITAL 5 - ED | Age: 47
Discharge: LEFT BEFORE DISCHARGE | End: 2022-09-30
Payer: MEDICARE

## 2022-09-30 DIAGNOSIS — Z53.21: ICD-10-CM

## 2022-09-30 DIAGNOSIS — R07.9: Primary | ICD-10-CM

## 2022-09-30 DIAGNOSIS — R06.02: ICD-10-CM
